# Patient Record
Sex: FEMALE | Race: WHITE | NOT HISPANIC OR LATINO | Employment: OTHER | URBAN - METROPOLITAN AREA
[De-identification: names, ages, dates, MRNs, and addresses within clinical notes are randomized per-mention and may not be internally consistent; named-entity substitution may affect disease eponyms.]

---

## 2017-01-01 ENCOUNTER — APPOINTMENT (OUTPATIENT)
Dept: CT IMAGING | Facility: HOSPITAL | Age: 82
End: 2017-01-01

## 2017-01-01 ENCOUNTER — APPOINTMENT (OUTPATIENT)
Dept: GENERAL RADIOLOGY | Facility: HOSPITAL | Age: 82
End: 2017-01-01

## 2017-01-01 ENCOUNTER — HOSPITAL ENCOUNTER (INPATIENT)
Facility: HOSPITAL | Age: 82
LOS: 2 days | Discharge: NURSING, REHAB OR LONG-TERM CARE | End: 2017-01-30
Attending: EMERGENCY MEDICINE | Admitting: INTERNAL MEDICINE

## 2017-01-01 ENCOUNTER — HOSPITAL ENCOUNTER (INPATIENT)
Facility: HOSPITAL | Age: 82
LOS: 11 days | Discharge: NURSING, REHAB OR LONG-TERM CARE | End: 2017-01-13
Attending: EMERGENCY MEDICINE | Admitting: FAMILY MEDICINE

## 2017-01-01 ENCOUNTER — HOSPITAL ENCOUNTER (EMERGENCY)
Facility: HOSPITAL | Age: 82
Discharge: HOME | End: 2017-03-10
Attending: EMERGENCY MEDICINE | Admitting: EMERGENCY MEDICINE

## 2017-01-01 ENCOUNTER — HOSPITAL ENCOUNTER (EMERGENCY)
Facility: HOSPITAL | Age: 82
Discharge: HOME | End: 2017-03-02
Attending: EMERGENCY MEDICINE | Admitting: EMERGENCY MEDICINE

## 2017-01-01 ENCOUNTER — HOSPITAL ENCOUNTER (INPATIENT)
Facility: HOSPITAL | Age: 82
End: 2017-01-01
Attending: FAMILY MEDICINE | Admitting: FAMILY MEDICINE

## 2017-01-01 VITALS
HEART RATE: 107 BPM | HEIGHT: 65 IN | WEIGHT: 110 LBS | BODY MASS INDEX: 18.33 KG/M2 | SYSTOLIC BLOOD PRESSURE: 141 MMHG | TEMPERATURE: 98.2 F | OXYGEN SATURATION: 90 % | DIASTOLIC BLOOD PRESSURE: 98 MMHG | RESPIRATION RATE: 20 BRPM

## 2017-01-01 VITALS
HEIGHT: 62 IN | WEIGHT: 99.21 LBS | TEMPERATURE: 98 F | OXYGEN SATURATION: 92 % | DIASTOLIC BLOOD PRESSURE: 88 MMHG | BODY MASS INDEX: 18.26 KG/M2 | RESPIRATION RATE: 18 BRPM | SYSTOLIC BLOOD PRESSURE: 115 MMHG | HEART RATE: 90 BPM

## 2017-01-01 VITALS
DIASTOLIC BLOOD PRESSURE: 114 MMHG | HEIGHT: 61 IN | SYSTOLIC BLOOD PRESSURE: 134 MMHG | HEART RATE: 78 BPM | RESPIRATION RATE: 20 BRPM | BODY MASS INDEX: 19.45 KG/M2 | OXYGEN SATURATION: 94 % | WEIGHT: 103 LBS | TEMPERATURE: 97.6 F

## 2017-01-01 VITALS
OXYGEN SATURATION: 97 % | WEIGHT: 97.7 LBS | BODY MASS INDEX: 17.98 KG/M2 | HEART RATE: 77 BPM | HEIGHT: 62 IN | TEMPERATURE: 98 F | SYSTOLIC BLOOD PRESSURE: 111 MMHG | RESPIRATION RATE: 16 BRPM | DIASTOLIC BLOOD PRESSURE: 73 MMHG

## 2017-01-01 DIAGNOSIS — J18.9 COMMUNITY ACQUIRED PNEUMONIA: ICD-10-CM

## 2017-01-01 DIAGNOSIS — Z74.09 IMPAIRED MOBILITY AND ADLS: ICD-10-CM

## 2017-01-01 DIAGNOSIS — D64.9 CHRONIC ANEMIA: ICD-10-CM

## 2017-01-01 DIAGNOSIS — I48.20 CHRONIC ATRIAL FIBRILLATION (HCC): Chronic | ICD-10-CM

## 2017-01-01 DIAGNOSIS — Z78.9 IMPAIRED MOBILITY AND ADLS: ICD-10-CM

## 2017-01-01 DIAGNOSIS — I10 ESSENTIAL HYPERTENSION: Chronic | ICD-10-CM

## 2017-01-01 DIAGNOSIS — I48.91 ATRIAL FIBRILLATION, UNSPECIFIED TYPE (HCC): ICD-10-CM

## 2017-01-01 DIAGNOSIS — Z74.09 IMPAIRED FUNCTIONAL MOBILITY, BALANCE, GAIT, AND ENDURANCE: ICD-10-CM

## 2017-01-01 DIAGNOSIS — J98.01 BRONCHOSPASM: ICD-10-CM

## 2017-01-01 DIAGNOSIS — T07.XXXA MULTIPLE ABRASIONS: ICD-10-CM

## 2017-01-01 DIAGNOSIS — I50.9 ACUTE CONGESTIVE HEART FAILURE, UNSPECIFIED CONGESTIVE HEART FAILURE TYPE: Primary | ICD-10-CM

## 2017-01-01 DIAGNOSIS — I50.31 ACUTE DIASTOLIC (CONGESTIVE) HEART FAILURE (HCC): Primary | ICD-10-CM
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
## 2017-01-01 DIAGNOSIS — J96.01 ACUTE RESPIRATORY FAILURE WITH HYPOXIA (HCC): ICD-10-CM

## 2017-01-01 DIAGNOSIS — D64.9 SYMPTOMATIC ANEMIA: Primary | ICD-10-CM

## 2017-01-01 DIAGNOSIS — R53.1 GENERAL WEAKNESS: ICD-10-CM

## 2017-01-01 DIAGNOSIS — S00.83XA TRAUMATIC HEMATOMA OF FOREHEAD, INITIAL ENCOUNTER: Primary | ICD-10-CM

## 2017-01-01 LAB
ABO + RH BLD: NORMAL
ABO GROUP BLD: NORMAL
ABO GROUP BLD: NORMAL
ALBUMIN SERPL-MCNC: 2.8 G/DL (ref 3.2–4.8)
ALBUMIN SERPL-MCNC: 3.1 G/DL (ref 3.2–4.8)
ALBUMIN SERPL-MCNC: 3.2 G/DL (ref 3.2–4.8)
ALBUMIN SERPL-MCNC: 3.4 G/DL (ref 3.2–4.8)
ALBUMIN/GLOB SERPL: 0.8 G/DL (ref 1.5–2.5)
ALBUMIN/GLOB SERPL: 0.9 G/DL (ref 1.5–2.5)
ALP SERPL-CCNC: 103 U/L (ref 25–100)
ALP SERPL-CCNC: 127 U/L (ref 25–100)
ALP SERPL-CCNC: 148 U/L (ref 25–100)
ALP SERPL-CCNC: 89 U/L (ref 25–100)
ALT SERPL W P-5'-P-CCNC: 14 U/L (ref 7–40)
ALT SERPL W P-5'-P-CCNC: 15 U/L (ref 7–40)
ALT SERPL W P-5'-P-CCNC: 22 U/L (ref 7–40)
ALT SERPL W P-5'-P-CCNC: 9 U/L (ref 7–40)
ANION GAP SERPL CALCULATED.3IONS-SCNC: -2 MMOL/L (ref 3–11)
ANION GAP SERPL CALCULATED.3IONS-SCNC: 0 MMOL/L (ref 3–11)
ANION GAP SERPL CALCULATED.3IONS-SCNC: 10 MMOL/L (ref 3–11)
ANION GAP SERPL CALCULATED.3IONS-SCNC: 12 MMOL/L (ref 3–11)
ANION GAP SERPL CALCULATED.3IONS-SCNC: 3 MMOL/L (ref 3–11)
ANION GAP SERPL CALCULATED.3IONS-SCNC: 6 MMOL/L (ref 3–11)
ANION GAP SERPL CALCULATED.3IONS-SCNC: 6 MMOL/L (ref 3–11)
ANION GAP SERPL CALCULATED.3IONS-SCNC: 7 MMOL/L (ref 3–11)
ANION GAP SERPL CALCULATED.3IONS-SCNC: 7 MMOL/L (ref 3–11)
ANION GAP SERPL CALCULATED.3IONS-SCNC: 8 MMOL/L (ref 3–11)
ANION GAP SERPL CALCULATED.3IONS-SCNC: 9 MMOL/L (ref 3–11)
APTT PPP: 26.4 SECONDS (ref 24–31)
APTT PPP: 27.5 SECONDS (ref 24–31)
ARTERIAL PATENCY WRIST A: ABNORMAL
AST SERPL-CCNC: 19 U/L (ref 0–33)
AST SERPL-CCNC: 24 U/L (ref 0–33)
AST SERPL-CCNC: 25 U/L (ref 0–33)
AST SERPL-CCNC: 25 U/L (ref 0–33)
ATMOSPHERIC PRESS: ABNORMAL MMHG
BACTERIA SPEC AEROBE CULT: NORMAL
BACTERIA SPEC AEROBE CULT: NORMAL
BASE EXCESS BLDA CALC-SCNC: 8 MMOL/L (ref 0–2)
BASOPHILS # BLD AUTO: 0.04 10*3/MM3 (ref 0–0.2)
BASOPHILS # BLD AUTO: 0.05 10*3/MM3 (ref 0–0.2)
BASOPHILS # BLD AUTO: 0.07 10*3/MM3 (ref 0–0.2)
BASOPHILS # BLD AUTO: 0.09 10*3/MM3 (ref 0–0.2)
BASOPHILS NFR BLD AUTO: 0.5 % (ref 0–1)
BASOPHILS NFR BLD AUTO: 0.9 % (ref 0–1)
BASOPHILS NFR BLD AUTO: 0.9 % (ref 0–1)
BASOPHILS NFR BLD AUTO: 1.2 % (ref 0–1)
BDY SITE: ABNORMAL
BH BB BLOOD EXPIRATION DATE: NORMAL
BH BB BLOOD TYPE BARCODE: 5100
BH BB DISPENSE STATUS: NORMAL
BH BB PRODUCT CODE: NORMAL
BH BB UNIT NUMBER: NORMAL
BILIRUB SERPL-MCNC: 0.2 MG/DL (ref 0.3–1.2)
BILIRUB SERPL-MCNC: 0.3 MG/DL (ref 0.3–1.2)
BILIRUB SERPL-MCNC: 0.8 MG/DL (ref 0.3–1.2)
BILIRUB SERPL-MCNC: 0.9 MG/DL (ref 0.3–1.2)
BLD GP AB SCN SERPL QL: NEGATIVE
BLD GP AB SCN SERPL QL: NEGATIVE
BNP SERPL-MCNC: 1020 PG/ML (ref 0–100)
BNP SERPL-MCNC: 1060 PG/ML (ref 0–100)
BNP SERPL-MCNC: 567 PG/ML (ref 0–100)
BUN BLD-MCNC: 14 MG/DL (ref 9–23)
BUN BLD-MCNC: 16 MG/DL (ref 9–23)
BUN BLD-MCNC: 18 MG/DL (ref 9–23)
BUN BLD-MCNC: 19 MG/DL (ref 9–23)
BUN BLD-MCNC: 19 MG/DL (ref 9–23)
BUN BLD-MCNC: 21 MG/DL (ref 9–23)
BUN BLD-MCNC: 21 MG/DL (ref 9–23)
BUN BLD-MCNC: 22 MG/DL (ref 9–23)
BUN BLD-MCNC: 25 MG/DL (ref 9–23)
BUN BLD-MCNC: 26 MG/DL (ref 9–23)
BUN BLD-MCNC: 29 MG/DL (ref 9–23)
BUN BLD-MCNC: 29 MG/DL (ref 9–23)
BUN BLD-MCNC: 30 MG/DL (ref 9–23)
BUN BLD-MCNC: 31 MG/DL (ref 9–23)
BUN/CREAT SERPL: 16 (ref 7–25)
BUN/CREAT SERPL: 17.5 (ref 7–25)
BUN/CREAT SERPL: 21 (ref 7–25)
BUN/CREAT SERPL: 21 (ref 7–25)
BUN/CREAT SERPL: 21.1 (ref 7–25)
BUN/CREAT SERPL: 21.1 (ref 7–25)
BUN/CREAT SERPL: 22.5 (ref 7–25)
BUN/CREAT SERPL: 22.7 (ref 7–25)
BUN/CREAT SERPL: 23.6 (ref 7–25)
BUN/CREAT SERPL: 24.4 (ref 7–25)
BUN/CREAT SERPL: 25.8 (ref 7–25)
BUN/CREAT SERPL: 26.4 (ref 7–25)
BUN/CREAT SERPL: 26.4 (ref 7–25)
BUN/CREAT SERPL: 27.3 (ref 7–25)
C DIFF TOX GENS STL QL NAA+PROBE: NOT DETECTED
CALCIUM SPEC-SCNC: 10 MG/DL (ref 8.7–10.4)
CALCIUM SPEC-SCNC: 10.3 MG/DL (ref 8.7–10.4)
CALCIUM SPEC-SCNC: 8.8 MG/DL (ref 8.7–10.4)
CALCIUM SPEC-SCNC: 9.1 MG/DL (ref 8.7–10.4)
CALCIUM SPEC-SCNC: 9.2 MG/DL (ref 8.7–10.4)
CALCIUM SPEC-SCNC: 9.2 MG/DL (ref 8.7–10.4)
CALCIUM SPEC-SCNC: 9.4 MG/DL (ref 8.7–10.4)
CALCIUM SPEC-SCNC: 9.4 MG/DL (ref 8.7–10.4)
CALCIUM SPEC-SCNC: 9.5 MG/DL (ref 8.7–10.4)
CALCIUM SPEC-SCNC: 9.7 MG/DL (ref 8.7–10.4)
CALCIUM SPEC-SCNC: 9.8 MG/DL (ref 8.7–10.4)
CALCIUM SPEC-SCNC: 9.8 MG/DL (ref 8.7–10.4)
CHLORIDE SERPL-SCNC: 100 MMOL/L (ref 99–109)
CHLORIDE SERPL-SCNC: 100 MMOL/L (ref 99–109)
CHLORIDE SERPL-SCNC: 101 MMOL/L (ref 99–109)
CHLORIDE SERPL-SCNC: 101 MMOL/L (ref 99–109)
CHLORIDE SERPL-SCNC: 102 MMOL/L (ref 99–109)
CHLORIDE SERPL-SCNC: 103 MMOL/L (ref 99–109)
CHLORIDE SERPL-SCNC: 91 MMOL/L (ref 99–109)
CHLORIDE SERPL-SCNC: 91 MMOL/L (ref 99–109)
CHLORIDE SERPL-SCNC: 93 MMOL/L (ref 99–109)
CHLORIDE SERPL-SCNC: 95 MMOL/L (ref 99–109)
CHLORIDE SERPL-SCNC: 97 MMOL/L (ref 99–109)
CHLORIDE SERPL-SCNC: 99 MMOL/L (ref 99–109)
CO2 BLDA-SCNC: 34.5 MMOL/L (ref 22–33)
CO2 SERPL-SCNC: 27 MMOL/L (ref 20–31)
CO2 SERPL-SCNC: 29 MMOL/L (ref 20–31)
CO2 SERPL-SCNC: 30 MMOL/L (ref 20–31)
CO2 SERPL-SCNC: 30 MMOL/L (ref 20–31)
CO2 SERPL-SCNC: 31 MMOL/L (ref 20–31)
CO2 SERPL-SCNC: 31 MMOL/L (ref 20–31)
CO2 SERPL-SCNC: 33 MMOL/L (ref 20–31)
CO2 SERPL-SCNC: 34 MMOL/L (ref 20–31)
CO2 SERPL-SCNC: 38 MMOL/L (ref 20–31)
CO2 SERPL-SCNC: 38 MMOL/L (ref 20–31)
CO2 SERPL-SCNC: 39 MMOL/L (ref 20–31)
CO2 SERPL-SCNC: 39 MMOL/L (ref 20–31)
CO2 SERPL-SCNC: 42 MMOL/L (ref 20–31)
CO2 SERPL-SCNC: 42 MMOL/L (ref 20–31)
COHGB MFR BLD: 0.9 % (ref 0–2)
CREAT BLD-MCNC: 0.8 MG/DL (ref 0.6–1.3)
CREAT BLD-MCNC: 0.8 MG/DL (ref 0.6–1.3)
CREAT BLD-MCNC: 0.9 MG/DL (ref 0.6–1.3)
CREAT BLD-MCNC: 1 MG/DL (ref 0.6–1.3)
CREAT BLD-MCNC: 1.1 MG/DL (ref 0.6–1.3)
CREAT BLD-MCNC: 1.2 MG/DL (ref 0.6–1.3)
CROSSMATCH INTERPRETATION: NORMAL
D-LACTATE SERPL-SCNC: 1.7 MMOL/L (ref 0.5–2)
DEPRECATED RDW RBC AUTO: 65.3 FL (ref 37–54)
DEPRECATED RDW RBC AUTO: 69.2 FL (ref 37–54)
DEPRECATED RDW RBC AUTO: 71.3 FL (ref 37–54)
DEPRECATED RDW RBC AUTO: 73.8 FL (ref 37–54)
DEPRECATED RDW RBC AUTO: 76.1 FL (ref 37–54)
DEPRECATED RDW RBC AUTO: 81 FL (ref 37–54)
DEPRECATED RDW RBC AUTO: 81.7 FL (ref 37–54)
DEPRECATED RDW RBC AUTO: 83.5 FL (ref 37–54)
DEPRECATED RDW RBC AUTO: 84.9 FL (ref 37–54)
DEVELOPER EXPIRATION DATE: NORMAL
DEVELOPER LOT NUMBER: NORMAL
EOSINOPHIL # BLD AUTO: 0.21 10*3/MM3 (ref 0.1–0.3)
EOSINOPHIL # BLD AUTO: 0.5 10*3/MM3 (ref 0.1–0.3)
EOSINOPHIL # BLD AUTO: 0.54 10*3/MM3 (ref 0.1–0.3)
EOSINOPHIL # BLD AUTO: 0.55 10*3/MM3 (ref 0.1–0.3)
EOSINOPHIL NFR BLD AUTO: 3.7 % (ref 0–3)
EOSINOPHIL NFR BLD AUTO: 6.6 % (ref 0–3)
EOSINOPHIL NFR BLD AUTO: 6.9 % (ref 0–3)
EOSINOPHIL NFR BLD AUTO: 7.1 % (ref 0–3)
ERYTHROCYTE [DISTWIDTH] IN BLOOD BY AUTOMATED COUNT: 17 % (ref 11.3–14.5)
ERYTHROCYTE [DISTWIDTH] IN BLOOD BY AUTOMATED COUNT: 18.8 % (ref 11.3–14.5)
ERYTHROCYTE [DISTWIDTH] IN BLOOD BY AUTOMATED COUNT: 19 % (ref 11.3–14.5)
ERYTHROCYTE [DISTWIDTH] IN BLOOD BY AUTOMATED COUNT: 19.5 % (ref 11.3–14.5)
ERYTHROCYTE [DISTWIDTH] IN BLOOD BY AUTOMATED COUNT: 20.2 % (ref 11.3–14.5)
ERYTHROCYTE [DISTWIDTH] IN BLOOD BY AUTOMATED COUNT: 20.8 % (ref 11.3–14.5)
ERYTHROCYTE [DISTWIDTH] IN BLOOD BY AUTOMATED COUNT: 21.4 % (ref 11.3–14.5)
ERYTHROCYTE [DISTWIDTH] IN BLOOD BY AUTOMATED COUNT: 21.7 % (ref 11.3–14.5)
ERYTHROCYTE [DISTWIDTH] IN BLOOD BY AUTOMATED COUNT: 21.9 % (ref 11.3–14.5)
EXPIRATION DATE: NORMAL
FECAL OCCULT BLOOD SCREEN, POC: NORMAL
FERRITIN SERPL-MCNC: 248 NG/ML (ref 10–291)
FOLATE SERPL-MCNC: >24 NG/ML (ref 3.2–20)
GFR SERPL CREATININE-BSD FRML MDRD: 42 ML/MIN/1.73
GFR SERPL CREATININE-BSD FRML MDRD: 46 ML/MIN/1.73
GFR SERPL CREATININE-BSD FRML MDRD: 52 ML/MIN/1.73
GFR SERPL CREATININE-BSD FRML MDRD: 58 ML/MIN/1.73
GFR SERPL CREATININE-BSD FRML MDRD: 67 ML/MIN/1.73
GFR SERPL CREATININE-BSD FRML MDRD: 67 ML/MIN/1.73
GLOBULIN UR ELPH-MCNC: 3.6 GM/DL
GLOBULIN UR ELPH-MCNC: 4 GM/DL
GLOBULIN UR ELPH-MCNC: 4 GM/DL
GLOBULIN UR ELPH-MCNC: 4.1 GM/DL
GLUCOSE BLD-MCNC: 100 MG/DL (ref 70–100)
GLUCOSE BLD-MCNC: 102 MG/DL (ref 70–100)
GLUCOSE BLD-MCNC: 104 MG/DL (ref 70–100)
GLUCOSE BLD-MCNC: 108 MG/DL (ref 70–100)
GLUCOSE BLD-MCNC: 109 MG/DL (ref 70–100)
GLUCOSE BLD-MCNC: 80 MG/DL (ref 70–100)
GLUCOSE BLD-MCNC: 84 MG/DL (ref 70–100)
GLUCOSE BLD-MCNC: 84 MG/DL (ref 70–100)
GLUCOSE BLD-MCNC: 88 MG/DL (ref 70–100)
GLUCOSE BLD-MCNC: 89 MG/DL (ref 70–100)
GLUCOSE BLD-MCNC: 89 MG/DL (ref 70–100)
GLUCOSE BLD-MCNC: 93 MG/DL (ref 70–100)
GLUCOSE BLD-MCNC: 94 MG/DL (ref 70–100)
GLUCOSE BLD-MCNC: 97 MG/DL (ref 70–100)
HCO3 BLDA-SCNC: 33 MMOL/L (ref 20–26)
HCT VFR BLD AUTO: 26.1 % (ref 34.5–44)
HCT VFR BLD AUTO: 26.4 % (ref 34.5–44)
HCT VFR BLD AUTO: 26.6 % (ref 34.5–44)
HCT VFR BLD AUTO: 26.6 % (ref 34.5–44)
HCT VFR BLD AUTO: 27.1 % (ref 34.5–44)
HCT VFR BLD AUTO: 27.2 % (ref 34.5–44)
HCT VFR BLD AUTO: 27.8 % (ref 34.5–44)
HCT VFR BLD AUTO: 29 % (ref 34.5–44)
HCT VFR BLD AUTO: 29.4 % (ref 34.5–44)
HCT VFR BLD CALC: 25.2 %
HGB BLD-MCNC: 8.3 G/DL (ref 11.5–15.5)
HGB BLD-MCNC: 8.4 G/DL (ref 11.5–15.5)
HGB BLD-MCNC: 8.5 G/DL (ref 11.5–15.5)
HGB BLD-MCNC: 8.7 G/DL (ref 11.5–15.5)
HGB BLD-MCNC: 8.7 G/DL (ref 11.5–15.5)
HGB BLD-MCNC: 8.8 G/DL (ref 11.5–15.5)
HGB BLD-MCNC: 8.9 G/DL (ref 11.5–15.5)
HGB BLD-MCNC: 9.3 G/DL (ref 11.5–15.5)
HGB BLD-MCNC: 9.4 G/DL (ref 11.5–15.5)
HGB BLDA-MCNC: 8.2 G/DL (ref 14–18)
HOLD SPECIMEN: NORMAL
HOROWITZ INDEX BLD+IHG-RTO: 32 %
IMM GRANULOCYTES # BLD: 0.04 10*3/MM3 (ref 0–0.03)
IMM GRANULOCYTES # BLD: 0.05 10*3/MM3 (ref 0–0.03)
IMM GRANULOCYTES # BLD: 0.07 10*3/MM3 (ref 0–0.03)
IMM GRANULOCYTES # BLD: 0.07 10*3/MM3 (ref 0–0.03)
IMM GRANULOCYTES NFR BLD: 0.6 % (ref 0–0.6)
IMM GRANULOCYTES NFR BLD: 0.6 % (ref 0–0.6)
IMM GRANULOCYTES NFR BLD: 0.9 % (ref 0–0.6)
IMM GRANULOCYTES NFR BLD: 1.2 % (ref 0–0.6)
INR PPP: 1.03
INR PPP: 1.05
IRON 24H UR-MRATE: 31 MCG/DL (ref 50–175)
IRON SATN MFR SERPL: 48 % (ref 15–50)
LIPASE SERPL-CCNC: 18 U/L (ref 6–51)
LYMPHOCYTES # BLD AUTO: 0.99 10*3/MM3 (ref 0.6–4.8)
LYMPHOCYTES # BLD AUTO: 1.12 10*3/MM3 (ref 0.6–4.8)
LYMPHOCYTES # BLD AUTO: 1.86 10*3/MM3 (ref 0.6–4.8)
LYMPHOCYTES # BLD AUTO: 1.97 10*3/MM3 (ref 0.6–4.8)
LYMPHOCYTES NFR BLD AUTO: 13.7 % (ref 24–44)
LYMPHOCYTES NFR BLD AUTO: 19.5 % (ref 24–44)
LYMPHOCYTES NFR BLD AUTO: 23.9 % (ref 24–44)
LYMPHOCYTES NFR BLD AUTO: 24.1 % (ref 24–44)
Lab: NORMAL
MAGNESIUM SERPL-MCNC: 2.1 MG/DL (ref 1.3–2.7)
MCH RBC QN AUTO: 32.2 PG (ref 27–31)
MCH RBC QN AUTO: 32.7 PG (ref 27–31)
MCH RBC QN AUTO: 32.9 PG (ref 27–31)
MCH RBC QN AUTO: 33.2 PG (ref 27–31)
MCH RBC QN AUTO: 33.2 PG (ref 27–31)
MCH RBC QN AUTO: 33.7 PG (ref 27–31)
MCH RBC QN AUTO: 33.8 PG (ref 27–31)
MCH RBC QN AUTO: 34.8 PG (ref 27–31)
MCH RBC QN AUTO: 34.8 PG (ref 27–31)
MCHC RBC AUTO-ENTMCNC: 31.6 G/DL (ref 32–36)
MCHC RBC AUTO-ENTMCNC: 31.7 G/DL (ref 32–36)
MCHC RBC AUTO-ENTMCNC: 31.8 G/DL (ref 32–36)
MCHC RBC AUTO-ENTMCNC: 32 G/DL (ref 32–36)
MCHC RBC AUTO-ENTMCNC: 32.1 G/DL (ref 32–36)
MCHC RBC AUTO-ENTMCNC: 32.1 G/DL (ref 32–36)
MCHC RBC AUTO-ENTMCNC: 32.2 G/DL (ref 32–36)
MCHC RBC AUTO-ENTMCNC: 32.7 G/DL (ref 32–36)
MCHC RBC AUTO-ENTMCNC: 32.7 G/DL (ref 32–36)
MCV RBC AUTO: 100 FL (ref 80–99)
MCV RBC AUTO: 100.4 FL (ref 80–99)
MCV RBC AUTO: 103.1 FL (ref 80–99)
MCV RBC AUTO: 104.3 FL (ref 80–99)
MCV RBC AUTO: 104.9 FL (ref 80–99)
MCV RBC AUTO: 105.8 FL (ref 80–99)
MCV RBC AUTO: 106.1 FL (ref 80–99)
MCV RBC AUTO: 106.4 FL (ref 80–99)
MCV RBC AUTO: 108.6 FL (ref 80–99)
METHGB BLD QL: 0.8 % (ref 0–1.5)
MODALITY: ABNORMAL
MONOCYTES # BLD AUTO: 0.46 10*3/MM3 (ref 0–1)
MONOCYTES # BLD AUTO: 0.86 10*3/MM3 (ref 0–1)
MONOCYTES # BLD AUTO: 0.97 10*3/MM3 (ref 0–1)
MONOCYTES # BLD AUTO: 1.07 10*3/MM3 (ref 0–1)
MONOCYTES NFR BLD AUTO: 11.9 % (ref 0–12)
MONOCYTES NFR BLD AUTO: 12.5 % (ref 0–12)
MONOCYTES NFR BLD AUTO: 13.1 % (ref 0–12)
MONOCYTES NFR BLD AUTO: 8 % (ref 0–12)
NEGATIVE CONTROL: NEGATIVE
NEUTROPHILS # BLD AUTO: 3.82 10*3/MM3 (ref 1.5–8.3)
NEUTROPHILS # BLD AUTO: 4.31 10*3/MM3 (ref 1.5–8.3)
NEUTROPHILS # BLD AUTO: 4.47 10*3/MM3 (ref 1.5–8.3)
NEUTROPHILS # BLD AUTO: 4.75 10*3/MM3 (ref 1.5–8.3)
NEUTROPHILS NFR BLD AUTO: 54.4 % (ref 41–71)
NEUTROPHILS NFR BLD AUTO: 55.4 % (ref 41–71)
NEUTROPHILS NFR BLD AUTO: 65.7 % (ref 41–71)
NEUTROPHILS NFR BLD AUTO: 66.7 % (ref 41–71)
NRBC BLD MANUAL-RTO: 0 /100 WBC (ref 0–0)
OXYHGB MFR BLDV: 93.5 % (ref 94–99)
PCO2 BLDA: 49.9 MM HG (ref 35–45)
PH BLDA: 7.43 PH UNITS (ref 7.35–7.45)
PLATELET # BLD AUTO: 152 10*3/MM3 (ref 150–450)
PLATELET # BLD AUTO: 152 10*3/MM3 (ref 150–450)
PLATELET # BLD AUTO: 159 10*3/MM3 (ref 150–450)
PLATELET # BLD AUTO: 172 10*3/MM3 (ref 150–450)
PLATELET # BLD AUTO: 175 10*3/MM3 (ref 150–450)
PLATELET # BLD AUTO: 187 10*3/MM3 (ref 150–450)
PLATELET # BLD AUTO: 190 10*3/MM3 (ref 150–450)
PLATELET # BLD AUTO: 197 10*3/MM3 (ref 150–450)
PLATELET # BLD AUTO: 233 10*3/MM3 (ref 150–450)
PMV BLD AUTO: 10.6 FL (ref 6–12)
PMV BLD AUTO: 10.8 FL (ref 6–12)
PMV BLD AUTO: 10.9 FL (ref 6–12)
PMV BLD AUTO: 11 FL (ref 6–12)
PMV BLD AUTO: 11 FL (ref 6–12)
PMV BLD AUTO: 11.2 FL (ref 6–12)
PMV BLD AUTO: 11.6 FL (ref 6–12)
PO2 BLDA: 87.2 MM HG (ref 83–108)
POSITIVE CONTROL: POSITIVE
POTASSIUM BLD-SCNC: 2.6 MMOL/L (ref 3.5–5.5)
POTASSIUM BLD-SCNC: 2.9 MMOL/L (ref 3.5–5.5)
POTASSIUM BLD-SCNC: 3.2 MMOL/L (ref 3.5–5.5)
POTASSIUM BLD-SCNC: 3.2 MMOL/L (ref 3.5–5.5)
POTASSIUM BLD-SCNC: 3.6 MMOL/L (ref 3.5–5.5)
POTASSIUM BLD-SCNC: 3.7 MMOL/L (ref 3.5–5.5)
POTASSIUM BLD-SCNC: 3.8 MMOL/L (ref 3.5–5.5)
POTASSIUM BLD-SCNC: 3.8 MMOL/L (ref 3.5–5.5)
POTASSIUM BLD-SCNC: 4.1 MMOL/L (ref 3.5–5.5)
POTASSIUM BLD-SCNC: 4.2 MMOL/L (ref 3.5–5.5)
POTASSIUM BLD-SCNC: 4.3 MMOL/L (ref 3.5–5.5)
POTASSIUM BLD-SCNC: 4.4 MMOL/L (ref 3.5–5.5)
POTASSIUM BLD-SCNC: 4.5 MMOL/L (ref 3.5–5.5)
POTASSIUM BLD-SCNC: 4.7 MMOL/L (ref 3.5–5.5)
PROT SERPL-MCNC: 6.4 G/DL (ref 5.7–8.2)
PROT SERPL-MCNC: 7.2 G/DL (ref 5.7–8.2)
PROT SERPL-MCNC: 7.2 G/DL (ref 5.7–8.2)
PROT SERPL-MCNC: 7.4 G/DL (ref 5.7–8.2)
PROTHROMBIN TIME: 11.2 SECONDS (ref 9.6–11.5)
PROTHROMBIN TIME: 11.4 SECONDS (ref 9.6–11.5)
RBC # BLD AUTO: 2.46 10*6/MM3 (ref 3.89–5.14)
RBC # BLD AUTO: 2.5 10*6/MM3 (ref 3.89–5.14)
RBC # BLD AUTO: 2.55 10*6/MM3 (ref 3.89–5.14)
RBC # BLD AUTO: 2.56 10*6/MM3 (ref 3.89–5.14)
RBC # BLD AUTO: 2.65 10*6/MM3 (ref 3.89–5.14)
RBC # BLD AUTO: 2.67 10*6/MM3 (ref 3.89–5.14)
RBC # BLD AUTO: 2.7 10*6/MM3 (ref 3.89–5.14)
RBC # BLD AUTO: 2.72 10*6/MM3 (ref 3.89–5.14)
RBC # BLD AUTO: 2.78 10*6/MM3 (ref 3.89–5.14)
RETICS/RBC NFR AUTO: 5.2 % (ref 0.5–1.5)
RH BLD: POSITIVE
RH BLD: POSITIVE
SODIUM BLD-SCNC: 135 MMOL/L (ref 132–146)
SODIUM BLD-SCNC: 135 MMOL/L (ref 132–146)
SODIUM BLD-SCNC: 136 MMOL/L (ref 132–146)
SODIUM BLD-SCNC: 138 MMOL/L (ref 132–146)
SODIUM BLD-SCNC: 139 MMOL/L (ref 132–146)
SODIUM BLD-SCNC: 140 MMOL/L (ref 132–146)
SODIUM BLD-SCNC: 141 MMOL/L (ref 132–146)
SODIUM BLD-SCNC: 142 MMOL/L (ref 132–146)
TIBC SERPL-MCNC: 64 MCG/DL (ref 250–450)
TROPONIN I SERPL-MCNC: 0.02 NG/ML (ref 0–0.07)
TROPONIN I SERPL-MCNC: 0.03 NG/ML (ref 0–0.07)
UNIT  ABO: NORMAL
UNIT  RH: NORMAL
VIT B12 BLD-MCNC: 779 PG/ML (ref 211–911)
WBC NRBC COR # BLD: 10.05 10*3/MM3 (ref 3.5–10.8)
WBC NRBC COR # BLD: 11.4 10*3/MM3 (ref 3.5–10.8)
WBC NRBC COR # BLD: 5.73 10*3/MM3 (ref 3.5–10.8)
WBC NRBC COR # BLD: 7.23 10*3/MM3 (ref 3.5–10.8)
WBC NRBC COR # BLD: 7.52 10*3/MM3 (ref 3.5–10.8)
WBC NRBC COR # BLD: 7.78 10*3/MM3 (ref 3.5–10.8)
WBC NRBC COR # BLD: 8.19 10*3/MM3 (ref 3.5–10.8)
WBC NRBC COR # BLD: 9.28 10*3/MM3 (ref 3.5–10.8)
WBC NRBC COR # BLD: 9.38 10*3/MM3 (ref 3.5–10.8)
WHOLE BLOOD HOLD SPECIMEN: NORMAL

## 2017-01-01 PROCEDURE — 99232 SBSQ HOSP IP/OBS MODERATE 35: CPT | Performed by: INTERNAL MEDICINE

## 2017-01-01 PROCEDURE — P9016 RBC LEUKOCYTES REDUCED: HCPCS

## 2017-01-01 PROCEDURE — 86920 COMPATIBILITY TEST SPIN: CPT

## 2017-01-01 PROCEDURE — 36415 COLL VENOUS BLD VENIPUNCTURE: CPT

## 2017-01-01 PROCEDURE — 80048 BASIC METABOLIC PNL TOTAL CA: CPT | Performed by: PHYSICIAN ASSISTANT

## 2017-01-01 PROCEDURE — 83540 ASSAY OF IRON: CPT | Performed by: INTERNAL MEDICINE

## 2017-01-01 PROCEDURE — 94640 AIRWAY INHALATION TREATMENT: CPT

## 2017-01-01 PROCEDURE — 97110 THERAPEUTIC EXERCISES: CPT

## 2017-01-01 PROCEDURE — 94799 UNLISTED PULMONARY SVC/PX: CPT

## 2017-01-01 PROCEDURE — 25010000002 PIPERACILLIN SOD-TAZOBACTAM PER 1 G: Performed by: FAMILY MEDICINE

## 2017-01-01 PROCEDURE — 25010000002 HEPARIN (PORCINE) PER 1000 UNITS: Performed by: NURSE PRACTITIONER

## 2017-01-01 PROCEDURE — 80053 COMPREHEN METABOLIC PANEL: CPT | Performed by: EMERGENCY MEDICINE

## 2017-01-01 PROCEDURE — 80048 BASIC METABOLIC PNL TOTAL CA: CPT | Performed by: INTERNAL MEDICINE

## 2017-01-01 PROCEDURE — 85025 COMPLETE CBC W/AUTO DIFF WBC: CPT | Performed by: EMERGENCY MEDICINE

## 2017-01-01 PROCEDURE — 83605 ASSAY OF LACTIC ACID: CPT | Performed by: EMERGENCY MEDICINE

## 2017-01-01 PROCEDURE — 97116 GAIT TRAINING THERAPY: CPT

## 2017-01-01 PROCEDURE — 80048 BASIC METABOLIC PNL TOTAL CA: CPT

## 2017-01-01 PROCEDURE — 99239 HOSP IP/OBS DSCHRG MGMT >30: CPT | Performed by: NURSE PRACTITIONER

## 2017-01-01 PROCEDURE — 97530 THERAPEUTIC ACTIVITIES: CPT

## 2017-01-01 PROCEDURE — G8978 MOBILITY CURRENT STATUS: HCPCS | Performed by: PHYSICAL THERAPIST

## 2017-01-01 PROCEDURE — 99232 SBSQ HOSP IP/OBS MODERATE 35: CPT | Performed by: FAMILY MEDICINE

## 2017-01-01 PROCEDURE — 83880 ASSAY OF NATRIURETIC PEPTIDE: CPT | Performed by: EMERGENCY MEDICINE

## 2017-01-01 PROCEDURE — 99285 EMERGENCY DEPT VISIT HI MDM: CPT

## 2017-01-01 PROCEDURE — 25010000002 FUROSEMIDE PER 20 MG: Performed by: EMERGENCY MEDICINE

## 2017-01-01 PROCEDURE — 93005 ELECTROCARDIOGRAM TRACING: CPT | Performed by: EMERGENCY MEDICINE

## 2017-01-01 PROCEDURE — 82805 BLOOD GASES W/O2 SATURATION: CPT | Performed by: INTERNAL MEDICINE

## 2017-01-01 PROCEDURE — 71010 HC CHEST PA OR AP: CPT

## 2017-01-01 PROCEDURE — 85027 COMPLETE CBC AUTOMATED: CPT | Performed by: FAMILY MEDICINE

## 2017-01-01 PROCEDURE — 99223 1ST HOSP IP/OBS HIGH 75: CPT | Performed by: INTERNAL MEDICINE

## 2017-01-01 PROCEDURE — 86901 BLOOD TYPING SEROLOGIC RH(D): CPT

## 2017-01-01 PROCEDURE — 86850 RBC ANTIBODY SCREEN: CPT

## 2017-01-01 PROCEDURE — 86900 BLOOD TYPING SEROLOGIC ABO: CPT

## 2017-01-01 PROCEDURE — 25010000002 VANCOMYCIN PER 500 MG: Performed by: EMERGENCY MEDICINE

## 2017-01-01 PROCEDURE — 99233 SBSQ HOSP IP/OBS HIGH 50: CPT | Performed by: FAMILY MEDICINE

## 2017-01-01 PROCEDURE — 36430 TRANSFUSION BLD/BLD COMPNT: CPT

## 2017-01-01 PROCEDURE — 99284 EMERGENCY DEPT VISIT MOD MDM: CPT

## 2017-01-01 PROCEDURE — 80048 BASIC METABOLIC PNL TOTAL CA: CPT | Performed by: FAMILY MEDICINE

## 2017-01-01 PROCEDURE — 85045 AUTOMATED RETICULOCYTE COUNT: CPT | Performed by: INTERNAL MEDICINE

## 2017-01-01 PROCEDURE — 80053 COMPREHEN METABOLIC PANEL: CPT | Performed by: INTERNAL MEDICINE

## 2017-01-01 PROCEDURE — 99233 SBSQ HOSP IP/OBS HIGH 50: CPT | Performed by: INTERNAL MEDICINE

## 2017-01-01 PROCEDURE — 25010000002 PIPERACILLIN SOD-TAZOBACTAM PER 1 G: Performed by: EMERGENCY MEDICINE

## 2017-01-01 PROCEDURE — 70450 CT HEAD/BRAIN W/O DYE: CPT

## 2017-01-01 PROCEDURE — 97165 OT EVAL LOW COMPLEX 30 MIN: CPT

## 2017-01-01 PROCEDURE — 85730 THROMBOPLASTIN TIME PARTIAL: CPT | Performed by: EMERGENCY MEDICINE

## 2017-01-01 PROCEDURE — 82746 ASSAY OF FOLIC ACID SERUM: CPT | Performed by: INTERNAL MEDICINE

## 2017-01-01 PROCEDURE — G8979 MOBILITY GOAL STATUS: HCPCS | Performed by: PHYSICAL THERAPIST

## 2017-01-01 PROCEDURE — 97162 PT EVAL MOD COMPLEX 30 MIN: CPT

## 2017-01-01 PROCEDURE — 84484 ASSAY OF TROPONIN QUANT: CPT

## 2017-01-01 PROCEDURE — 87040 BLOOD CULTURE FOR BACTERIA: CPT | Performed by: EMERGENCY MEDICINE

## 2017-01-01 PROCEDURE — 36600 WITHDRAWAL OF ARTERIAL BLOOD: CPT | Performed by: INTERNAL MEDICINE

## 2017-01-01 PROCEDURE — 96374 THER/PROPH/DIAG INJ IV PUSH: CPT

## 2017-01-01 PROCEDURE — 87493 C DIFF AMPLIFIED PROBE: CPT | Performed by: NURSE PRACTITIONER

## 2017-01-01 PROCEDURE — 25010000002 ONDANSETRON PER 1 MG: Performed by: FAMILY MEDICINE

## 2017-01-01 PROCEDURE — 85025 COMPLETE CBC W/AUTO DIFF WBC: CPT | Performed by: INTERNAL MEDICINE

## 2017-01-01 PROCEDURE — 99223 1ST HOSP IP/OBS HIGH 75: CPT | Performed by: FAMILY MEDICINE

## 2017-01-01 PROCEDURE — 85027 COMPLETE CBC AUTOMATED: CPT | Performed by: INTERNAL MEDICINE

## 2017-01-01 PROCEDURE — 25010000002 LEVOFLOXACIN PER 250 MG

## 2017-01-01 PROCEDURE — 83550 IRON BINDING TEST: CPT | Performed by: INTERNAL MEDICINE

## 2017-01-01 PROCEDURE — 85610 PROTHROMBIN TIME: CPT | Performed by: INTERNAL MEDICINE

## 2017-01-01 PROCEDURE — 83735 ASSAY OF MAGNESIUM: CPT

## 2017-01-01 PROCEDURE — 82607 VITAMIN B-12: CPT | Performed by: INTERNAL MEDICINE

## 2017-01-01 PROCEDURE — 97166 OT EVAL MOD COMPLEX 45 MIN: CPT | Performed by: OCCUPATIONAL THERAPIST

## 2017-01-01 PROCEDURE — 97162 PT EVAL MOD COMPLEX 30 MIN: CPT | Performed by: PHYSICAL THERAPIST

## 2017-01-01 PROCEDURE — 84132 ASSAY OF SERUM POTASSIUM: CPT | Performed by: INTERNAL MEDICINE

## 2017-01-01 PROCEDURE — 85610 PROTHROMBIN TIME: CPT | Performed by: EMERGENCY MEDICINE

## 2017-01-01 PROCEDURE — 93005 ELECTROCARDIOGRAM TRACING: CPT

## 2017-01-01 PROCEDURE — 94760 N-INVAS EAR/PLS OXIMETRY 1: CPT

## 2017-01-01 PROCEDURE — 82728 ASSAY OF FERRITIN: CPT | Performed by: INTERNAL MEDICINE

## 2017-01-01 PROCEDURE — 97530 THERAPEUTIC ACTIVITIES: CPT | Performed by: OCCUPATIONAL THERAPIST

## 2017-01-01 PROCEDURE — 83690 ASSAY OF LIPASE: CPT | Performed by: EMERGENCY MEDICINE

## 2017-01-01 PROCEDURE — 85730 THROMBOPLASTIN TIME PARTIAL: CPT | Performed by: INTERNAL MEDICINE

## 2017-01-01 RX ORDER — DOCUSATE SODIUM 100 MG/1
100 CAPSULE, LIQUID FILLED ORAL 2 TIMES DAILY
Status: DISCONTINUED | OUTPATIENT
Start: 2017-01-01 | End: 2017-01-01 | Stop reason: HOSPADM

## 2017-01-01 RX ORDER — CARVEDILOL 6.25 MG/1
6.25 TABLET ORAL 2 TIMES DAILY WITH MEALS
Status: DISCONTINUED | OUTPATIENT
Start: 2017-01-01 | End: 2017-01-01 | Stop reason: HOSPADM

## 2017-01-01 RX ORDER — DOCUSATE SODIUM 100 MG/1
200 CAPSULE, LIQUID FILLED ORAL DAILY
COMMUNITY

## 2017-01-01 RX ORDER — CARVEDILOL 6.25 MG/1
6.25 TABLET ORAL 2 TIMES DAILY WITH MEALS
Status: DISCONTINUED | OUTPATIENT
Start: 2017-01-01 | End: 2017-01-01

## 2017-01-01 RX ORDER — GUAIFENESIN AND DEXTROMETHORPHAN HYDROBROMIDE 600; 30 MG/1; MG/1
1 TABLET, EXTENDED RELEASE ORAL 2 TIMES DAILY
Status: DISCONTINUED | OUTPATIENT
Start: 2017-01-01 | End: 2017-01-01 | Stop reason: HOSPADM

## 2017-01-01 RX ORDER — METOLAZONE 5 MG/1
5 TABLET ORAL ONCE
Status: COMPLETED | OUTPATIENT
Start: 2017-01-01 | End: 2017-01-01

## 2017-01-01 RX ORDER — FUROSEMIDE 10 MG/ML
40 INJECTION INTRAMUSCULAR; INTRAVENOUS ONCE
Status: COMPLETED | OUTPATIENT
Start: 2017-01-01 | End: 2017-01-01

## 2017-01-01 RX ORDER — ONDANSETRON 2 MG/ML
4 INJECTION INTRAMUSCULAR; INTRAVENOUS EVERY 6 HOURS PRN
Status: DISCONTINUED | OUTPATIENT
Start: 2017-01-01 | End: 2017-01-01 | Stop reason: HOSPADM

## 2017-01-01 RX ORDER — HEPARIN SODIUM 5000 [USP'U]/ML
5000 INJECTION, SOLUTION INTRAVENOUS; SUBCUTANEOUS EVERY 12 HOURS SCHEDULED
Status: DISCONTINUED | OUTPATIENT
Start: 2017-01-01 | End: 2017-01-01 | Stop reason: HOSPADM

## 2017-01-01 RX ORDER — VALSARTAN 80 MG/1
40 TABLET ORAL EVERY 12 HOURS SCHEDULED
Status: DISCONTINUED | OUTPATIENT
Start: 2017-01-01 | End: 2017-01-01 | Stop reason: HOSPADM

## 2017-01-01 RX ORDER — DOXYCYCLINE HYCLATE 100 MG/1
100 CAPSULE ORAL EVERY 12 HOURS SCHEDULED
Status: DISCONTINUED | OUTPATIENT
Start: 2017-01-01 | End: 2017-01-01

## 2017-01-01 RX ORDER — LANOLIN ALCOHOL/MO/W.PET/CERES
1000 CREAM (GRAM) TOPICAL DAILY
COMMUNITY

## 2017-01-01 RX ORDER — TORSEMIDE 10 MG/1
5 TABLET ORAL DAILY
Status: ON HOLD | COMMUNITY
End: 2017-01-01

## 2017-01-01 RX ORDER — LIDOCAINE 50 MG/G
1 PATCH TOPICAL
Refills: 0
Start: 2017-01-01

## 2017-01-01 RX ORDER — BUMETANIDE 0.25 MG/ML
1 INJECTION INTRAMUSCULAR; INTRAVENOUS DAILY
Status: DISCONTINUED | OUTPATIENT
Start: 2017-01-01 | End: 2017-01-01

## 2017-01-01 RX ORDER — BUMETANIDE 0.25 MG/ML
1 INJECTION INTRAMUSCULAR; INTRAVENOUS EVERY 12 HOURS
Status: DISCONTINUED | OUTPATIENT
Start: 2017-01-01 | End: 2017-01-01

## 2017-01-01 RX ORDER — LIDOCAINE 50 MG/G
1 PATCH TOPICAL
Status: DISCONTINUED | OUTPATIENT
Start: 2017-01-01 | End: 2017-01-01 | Stop reason: HOSPADM

## 2017-01-01 RX ORDER — LOSARTAN POTASSIUM 50 MG/1
100 TABLET ORAL DAILY
Status: DISCONTINUED | OUTPATIENT
Start: 2017-01-01 | End: 2017-01-01

## 2017-01-01 RX ORDER — NYSTATIN 100000 [USP'U]/G
POWDER TOPICAL EVERY 12 HOURS SCHEDULED
Status: DISCONTINUED | OUTPATIENT
Start: 2017-01-01 | End: 2017-01-01 | Stop reason: HOSPADM

## 2017-01-01 RX ORDER — NYSTATIN 100000 [USP'U]/G
POWDER TOPICAL EVERY 12 HOURS SCHEDULED
Refills: 0
Start: 2017-01-01

## 2017-01-01 RX ORDER — LEVOFLOXACIN 5 MG/ML
750 INJECTION, SOLUTION INTRAVENOUS
Status: DISCONTINUED | OUTPATIENT
Start: 2017-01-01 | End: 2017-01-01

## 2017-01-01 RX ORDER — DOCUSATE SODIUM 100 MG/1
200 CAPSULE, LIQUID FILLED ORAL DAILY PRN
Status: DISCONTINUED | OUTPATIENT
Start: 2017-01-01 | End: 2017-01-01 | Stop reason: HOSPADM

## 2017-01-01 RX ORDER — GUAIFENESIN AND DEXTROMETHORPHAN HYDROBROMIDE 600; 30 MG/1; MG/1
1 TABLET, EXTENDED RELEASE ORAL 2 TIMES DAILY
Refills: 0
Start: 2017-01-01

## 2017-01-01 RX ORDER — SODIUM CHLORIDE 0.9 % (FLUSH) 0.9 %
10 SYRINGE (ML) INJECTION AS NEEDED
Status: DISCONTINUED | OUTPATIENT
Start: 2017-01-01 | End: 2017-01-01 | Stop reason: HOSPADM

## 2017-01-01 RX ORDER — ACETAMINOPHEN 325 MG/1
650 TABLET ORAL EVERY 6 HOURS PRN
Status: DISCONTINUED | OUTPATIENT
Start: 2017-01-01 | End: 2017-01-01 | Stop reason: HOSPADM

## 2017-01-01 RX ORDER — POLYETHYLENE GLYCOL 3350 17 G/17G
17 POWDER, FOR SOLUTION ORAL DAILY
Status: DISCONTINUED | OUTPATIENT
Start: 2017-01-01 | End: 2017-01-01

## 2017-01-01 RX ORDER — DOCUSATE SODIUM 100 MG/1
200 CAPSULE, LIQUID FILLED ORAL DAILY
Status: DISCONTINUED | OUTPATIENT
Start: 2017-01-01 | End: 2017-01-01

## 2017-01-01 RX ORDER — LANOLIN ALCOHOL/MO/W.PET/CERES
1000 CREAM (GRAM) TOPICAL DAILY
Status: DISCONTINUED | OUTPATIENT
Start: 2017-01-01 | End: 2017-01-01 | Stop reason: HOSPADM

## 2017-01-01 RX ORDER — FAMOTIDINE 20 MG/1
20 TABLET, FILM COATED ORAL 2 TIMES DAILY
Status: DISCONTINUED | OUTPATIENT
Start: 2017-01-01 | End: 2017-01-01 | Stop reason: HOSPADM

## 2017-01-01 RX ORDER — GABAPENTIN 100 MG/1
100 CAPSULE ORAL NIGHTLY
Status: DISCONTINUED | OUTPATIENT
Start: 2017-01-01 | End: 2017-01-01 | Stop reason: HOSPADM

## 2017-01-01 RX ORDER — ASPIRIN 81 MG/1
324 TABLET, CHEWABLE ORAL ONCE
Status: DISCONTINUED | OUTPATIENT
Start: 2017-01-01 | End: 2017-01-01

## 2017-01-01 RX ORDER — IPRATROPIUM BROMIDE AND ALBUTEROL SULFATE 2.5; .5 MG/3ML; MG/3ML
3 SOLUTION RESPIRATORY (INHALATION) EVERY 4 HOURS PRN
Status: DISCONTINUED | OUTPATIENT
Start: 2017-01-01 | End: 2017-01-01 | Stop reason: HOSPADM

## 2017-01-01 RX ORDER — CARVEDILOL 6.25 MG/1
6.25 TABLET ORAL 2 TIMES DAILY WITH MEALS
COMMUNITY

## 2017-01-01 RX ORDER — FLUOXETINE HYDROCHLORIDE 20 MG/1
40 CAPSULE ORAL DAILY
Status: DISCONTINUED | OUTPATIENT
Start: 2017-01-01 | End: 2017-01-01 | Stop reason: HOSPADM

## 2017-01-01 RX ORDER — POLYETHYLENE GLYCOL 3350 17 G/17G
17 POWDER, FOR SOLUTION ORAL DAILY PRN
Status: DISCONTINUED | OUTPATIENT
Start: 2017-01-01 | End: 2017-01-01 | Stop reason: HOSPADM

## 2017-01-01 RX ORDER — TORSEMIDE 20 MG/1
20 TABLET ORAL DAILY
Status: DISCONTINUED | OUTPATIENT
Start: 2017-01-01 | End: 2017-01-01 | Stop reason: HOSPADM

## 2017-01-01 RX ORDER — POTASSIUM CHLORIDE 7.45 MG/ML
10 INJECTION INTRAVENOUS
Status: DISCONTINUED | OUTPATIENT
Start: 2017-01-01 | End: 2017-01-01

## 2017-01-01 RX ORDER — DIGOXIN 125 MCG
125 TABLET ORAL
COMMUNITY
End: 2017-01-01 | Stop reason: HOSPADM

## 2017-01-01 RX ORDER — FLUTICASONE PROPIONATE 50 MCG
2 SPRAY, SUSPENSION (ML) NASAL DAILY
Status: DISCONTINUED | OUTPATIENT
Start: 2017-01-01 | End: 2017-01-01 | Stop reason: HOSPADM

## 2017-01-01 RX ORDER — DULOXETIN HYDROCHLORIDE 30 MG/1
30 CAPSULE, DELAYED RELEASE ORAL DAILY
COMMUNITY

## 2017-01-01 RX ORDER — POTASSIUM CHLORIDE 750 MG/1
10 TABLET, EXTENDED RELEASE ORAL DAILY
COMMUNITY

## 2017-01-01 RX ORDER — BRIMONIDINE TARTRATE 0.15 %
1 DROPS OPHTHALMIC (EYE) 2 TIMES DAILY
Status: DISCONTINUED | OUTPATIENT
Start: 2017-01-01 | End: 2017-01-01 | Stop reason: HOSPADM

## 2017-01-01 RX ORDER — BACITRACIN, NEOMYCIN, POLYMYXIN B 400; 3.5; 5 [USP'U]/G; MG/G; [USP'U]/G
1 OINTMENT TOPICAL ONCE
Status: COMPLETED | OUTPATIENT
Start: 2017-01-01 | End: 2017-01-01

## 2017-01-01 RX ORDER — LABETALOL HYDROCHLORIDE 5 MG/ML
20 INJECTION, SOLUTION INTRAVENOUS ONCE
Status: COMPLETED | OUTPATIENT
Start: 2017-01-01 | End: 2017-01-01

## 2017-01-01 RX ORDER — ALBUTEROL SULFATE 2.5 MG/3ML
2.5 SOLUTION RESPIRATORY (INHALATION) EVERY 6 HOURS PRN
Status: DISCONTINUED | OUTPATIENT
Start: 2017-01-01 | End: 2017-01-01 | Stop reason: HOSPADM

## 2017-01-01 RX ORDER — LANOLIN ALCOHOL/MO/W.PET/CERES
400 CREAM (GRAM) TOPICAL DAILY
Status: DISCONTINUED | OUTPATIENT
Start: 2017-01-01 | End: 2017-01-01 | Stop reason: HOSPADM

## 2017-01-01 RX ORDER — VANCOMYCIN HYDROCHLORIDE 1 G/200ML
20 INJECTION, SOLUTION INTRAVENOUS ONCE
Status: COMPLETED | OUTPATIENT
Start: 2017-01-01 | End: 2017-01-01

## 2017-01-01 RX ORDER — ALBUTEROL SULFATE 2.5 MG/3ML
2.5 SOLUTION RESPIRATORY (INHALATION) ONCE
Status: COMPLETED | OUTPATIENT
Start: 2017-01-01 | End: 2017-01-01

## 2017-01-01 RX ORDER — ASPIRIN 325 MG
325 TABLET ORAL DAILY
Status: DISCONTINUED | OUTPATIENT
Start: 2017-01-01 | End: 2017-01-01 | Stop reason: HOSPADM

## 2017-01-01 RX ORDER — DOCUSATE SODIUM 100 MG/1
200 CAPSULE, LIQUID FILLED ORAL DAILY
Status: DISCONTINUED | OUTPATIENT
Start: 2017-01-01 | End: 2017-01-01 | Stop reason: SDUPTHER

## 2017-01-01 RX ORDER — CARVEDILOL 12.5 MG/1
12.5 TABLET ORAL EVERY 12 HOURS SCHEDULED
Status: DISCONTINUED | OUTPATIENT
Start: 2017-01-01 | End: 2017-01-01 | Stop reason: HOSPADM

## 2017-01-01 RX ORDER — PANTOPRAZOLE SODIUM 40 MG/1
40 TABLET, DELAYED RELEASE ORAL
Status: DISCONTINUED | OUTPATIENT
Start: 2017-01-01 | End: 2017-01-01 | Stop reason: HOSPADM

## 2017-01-01 RX ORDER — ACETAMINOPHEN 325 MG/1
650 TABLET ORAL EVERY 4 HOURS PRN
Status: DISCONTINUED | OUTPATIENT
Start: 2017-01-01 | End: 2017-01-01 | Stop reason: SDUPTHER

## 2017-01-01 RX ORDER — LORAZEPAM 0.5 MG/1
0.5 TABLET ORAL 2 TIMES DAILY PRN
Status: DISCONTINUED | OUTPATIENT
Start: 2017-01-01 | End: 2017-01-01

## 2017-01-01 RX ORDER — TORSEMIDE 10 MG/1
20 TABLET ORAL DAILY
Start: 2017-01-01

## 2017-01-01 RX ORDER — CETIRIZINE HYDROCHLORIDE 1 MG/ML
5 SYRUP ORAL DAILY
Status: DISCONTINUED | OUTPATIENT
Start: 2017-01-01 | End: 2017-01-01 | Stop reason: HOSPADM

## 2017-01-01 RX ORDER — DULOXETIN HYDROCHLORIDE 30 MG/1
30 CAPSULE, DELAYED RELEASE ORAL DAILY
Status: DISCONTINUED | OUTPATIENT
Start: 2017-01-01 | End: 2017-01-01 | Stop reason: HOSPADM

## 2017-01-01 RX ORDER — LOSARTAN POTASSIUM 100 MG/1
100 TABLET ORAL DAILY
COMMUNITY
End: 2017-01-01 | Stop reason: HOSPADM

## 2017-01-01 RX ORDER — ACETAMINOPHEN 325 MG/1
650 TABLET ORAL EVERY 6 HOURS PRN
Status: DISCONTINUED | OUTPATIENT
Start: 2017-01-01 | End: 2017-01-01 | Stop reason: SDUPTHER

## 2017-01-01 RX ORDER — IPRATROPIUM BROMIDE AND ALBUTEROL SULFATE 2.5; .5 MG/3ML; MG/3ML
3 SOLUTION RESPIRATORY (INHALATION) EVERY 4 HOURS PRN
Qty: 360 ML
Start: 2017-01-01

## 2017-01-01 RX ORDER — SODIUM CHLORIDE 0.9 % (FLUSH) 0.9 %
1-10 SYRINGE (ML) INJECTION AS NEEDED
Status: DISCONTINUED | OUTPATIENT
Start: 2017-01-01 | End: 2017-01-01 | Stop reason: HOSPADM

## 2017-01-01 RX ORDER — GUAIFENESIN 400 MG/1
400 TABLET ORAL EVERY 6 HOURS PRN
COMMUNITY
End: 2017-01-01 | Stop reason: HOSPADM

## 2017-01-01 RX ORDER — DIGOXIN 125 MCG
125 TABLET ORAL
Status: DISCONTINUED | OUTPATIENT
Start: 2017-01-01 | End: 2017-01-01

## 2017-01-01 RX ORDER — ONDANSETRON 4 MG/1
4 TABLET, FILM COATED ORAL EVERY 6 HOURS PRN
Status: DISCONTINUED | OUTPATIENT
Start: 2017-01-01 | End: 2017-01-01 | Stop reason: HOSPADM

## 2017-01-01 RX ORDER — CARVEDILOL 3.12 MG/1
3.12 TABLET ORAL 2 TIMES DAILY WITH MEALS
Status: DISCONTINUED | OUTPATIENT
Start: 2017-01-01 | End: 2017-01-01

## 2017-01-01 RX ORDER — IPRATROPIUM BROMIDE AND ALBUTEROL SULFATE 2.5; .5 MG/3ML; MG/3ML
3 SOLUTION RESPIRATORY (INHALATION) ONCE
Status: COMPLETED | OUTPATIENT
Start: 2017-01-01 | End: 2017-01-01

## 2017-01-01 RX ORDER — POTASSIUM CHLORIDE 750 MG/1
40 CAPSULE, EXTENDED RELEASE ORAL AS NEEDED
Status: DISCONTINUED | OUTPATIENT
Start: 2017-01-01 | End: 2017-01-01

## 2017-01-01 RX ORDER — POTASSIUM CHLORIDE 750 MG/1
20 CAPSULE, EXTENDED RELEASE ORAL 2 TIMES DAILY WITH MEALS
Status: DISCONTINUED | OUTPATIENT
Start: 2017-01-01 | End: 2017-01-01 | Stop reason: HOSPADM

## 2017-01-01 RX ORDER — ACETAMINOPHEN 325 MG/1
650 TABLET ORAL EVERY 4 HOURS PRN
Status: DISCONTINUED | OUTPATIENT
Start: 2017-01-01 | End: 2017-01-01 | Stop reason: HOSPADM

## 2017-01-01 RX ORDER — BRIMONIDINE TARTRATE 0.15 %
1 DROPS OPHTHALMIC (EYE) 2 TIMES DAILY
Status: DISCONTINUED | OUTPATIENT
Start: 2017-01-01 | End: 2017-01-01

## 2017-01-01 RX ORDER — POTASSIUM CHLORIDE 1.5 G/1.77G
40 POWDER, FOR SOLUTION ORAL AS NEEDED
Status: DISCONTINUED | OUTPATIENT
Start: 2017-01-01 | End: 2017-01-01

## 2017-01-01 RX ORDER — VALSARTAN 40 MG/1
40 TABLET ORAL EVERY 12 HOURS SCHEDULED
Start: 2017-01-01

## 2017-01-01 RX ORDER — POTASSIUM CHLORIDE 750 MG/1
20 CAPSULE, EXTENDED RELEASE ORAL ONCE
Status: COMPLETED | OUTPATIENT
Start: 2017-01-01 | End: 2017-01-01

## 2017-01-01 RX ORDER — MECLIZINE HCL 12.5 MG/1
12.5 TABLET ORAL 2 TIMES DAILY PRN
Status: DISCONTINUED | OUTPATIENT
Start: 2017-01-01 | End: 2017-01-01 | Stop reason: HOSPADM

## 2017-01-01 RX ORDER — CETIRIZINE HYDROCHLORIDE 10 MG/1
10 TABLET ORAL DAILY
Status: DISCONTINUED | OUTPATIENT
Start: 2017-01-01 | End: 2017-01-01 | Stop reason: HOSPADM

## 2017-01-01 RX ADMIN — LIDOCAINE 1 PATCH: 50 PATCH CUTANEOUS at 08:45

## 2017-01-01 RX ADMIN — LEVOFLOXACIN 750 MG: 750 INJECTION, SOLUTION INTRAVENOUS at 00:39

## 2017-01-01 RX ADMIN — BUMETANIDE 1 MG: 0.25 INJECTION, SOLUTION INTRAMUSCULAR; INTRAVENOUS at 05:14

## 2017-01-01 RX ADMIN — HEPARIN SODIUM 5000 UNITS: 5000 INJECTION, SOLUTION INTRAVENOUS; SUBCUTANEOUS at 22:50

## 2017-01-01 RX ADMIN — DULOXETINE HYDROCHLORIDE 30 MG: 30 CAPSULE, DELAYED RELEASE ORAL at 08:55

## 2017-01-01 RX ADMIN — CARVEDILOL 6.25 MG: 6.25 TABLET, FILM COATED ORAL at 09:56

## 2017-01-01 RX ADMIN — FLUOXETINE HYDROCHLORIDE 40 MG: 20 CAPSULE ORAL at 09:57

## 2017-01-01 RX ADMIN — CARVEDILOL 6.25 MG: 6.25 TABLET, FILM COATED ORAL at 08:56

## 2017-01-01 RX ADMIN — DOXYCYCLINE HYCLATE 100 MG: 100 CAPSULE ORAL at 20:55

## 2017-01-01 RX ADMIN — POTASSIUM CHLORIDE 20 MEQ: 750 CAPSULE, EXTENDED RELEASE ORAL at 08:56

## 2017-01-01 RX ADMIN — HEPARIN SODIUM 5000 UNITS: 5000 INJECTION, SOLUTION INTRAVENOUS; SUBCUTANEOUS at 20:59

## 2017-01-01 RX ADMIN — DOCUSATE SODIUM 100 MG: 100 CAPSULE, LIQUID FILLED ORAL at 08:52

## 2017-01-01 RX ADMIN — IPRATROPIUM BROMIDE AND ALBUTEROL SULFATE 3 ML: .5; 3 SOLUTION RESPIRATORY (INHALATION) at 19:04

## 2017-01-01 RX ADMIN — Medication 1 DROP: at 08:33

## 2017-01-01 RX ADMIN — VALSARTAN 40 MG: 80 TABLET, FILM COATED ORAL at 09:06

## 2017-01-01 RX ADMIN — Medication 5 ML: at 13:16

## 2017-01-01 RX ADMIN — FLUTICASONE PROPIONATE 2 SPRAY: 50 SPRAY, METERED NASAL at 08:33

## 2017-01-01 RX ADMIN — DOCUSATE SODIUM 100 MG: 100 CAPSULE, LIQUID FILLED ORAL at 17:36

## 2017-01-01 RX ADMIN — VALSARTAN 40 MG: 80 TABLET, FILM COATED ORAL at 21:09

## 2017-01-01 RX ADMIN — TAZOBACTAM SODIUM AND PIPERACILLIN SODIUM 3.38 G: 375; 3 INJECTION, SOLUTION INTRAVENOUS at 09:56

## 2017-01-01 RX ADMIN — GABAPENTIN 100 MG: 100 CAPSULE ORAL at 22:25

## 2017-01-01 RX ADMIN — PANTOPRAZOLE SODIUM 40 MG: 40 TABLET, DELAYED RELEASE ORAL at 05:47

## 2017-01-01 RX ADMIN — DOXYCYCLINE HYCLATE 100 MG: 100 CAPSULE ORAL at 20:38

## 2017-01-01 RX ADMIN — TAZOBACTAM SODIUM AND PIPERACILLIN SODIUM 3.38 G: 375; 3 INJECTION, SOLUTION INTRAVENOUS at 09:58

## 2017-01-01 RX ADMIN — CETIRIZINE HYDROCHLORIDE 10 MG: 10 TABLET, FILM COATED ORAL at 09:57

## 2017-01-01 RX ADMIN — POLYETHYLENE GLYCOL 3350 17 G: 17 POWDER, FOR SOLUTION ORAL at 09:56

## 2017-01-01 RX ADMIN — FLUTICASONE PROPIONATE 2 SPRAY: 50 SPRAY, METERED NASAL at 09:00

## 2017-01-01 RX ADMIN — DOCUSATE SODIUM 200 MG: 100 CAPSULE, LIQUID FILLED ORAL at 09:58

## 2017-01-01 RX ADMIN — BUMETANIDE 1 MG: 0.25 INJECTION, SOLUTION INTRAMUSCULAR; INTRAVENOUS at 21:10

## 2017-01-01 RX ADMIN — GABAPENTIN 100 MG: 100 CAPSULE ORAL at 20:59

## 2017-01-01 RX ADMIN — HEPARIN SODIUM 5000 UNITS: 5000 INJECTION, SOLUTION INTRAVENOUS; SUBCUTANEOUS at 20:38

## 2017-01-01 RX ADMIN — DULOXETINE HYDROCHLORIDE 30 MG: 30 CAPSULE, DELAYED RELEASE ORAL at 09:57

## 2017-01-01 RX ADMIN — HEPARIN SODIUM 5000 UNITS: 5000 INJECTION, SOLUTION INTRAVENOUS; SUBCUTANEOUS at 09:57

## 2017-01-01 RX ADMIN — CETIRIZINE HYDROCHLORIDE 10 MG: 10 TABLET, FILM COATED ORAL at 09:00

## 2017-01-01 RX ADMIN — TAZOBACTAM SODIUM AND PIPERACILLIN SODIUM 3.38 G: 375; 3 INJECTION, SOLUTION INTRAVENOUS at 15:53

## 2017-01-01 RX ADMIN — VALSARTAN 40 MG: 80 TABLET, FILM COATED ORAL at 08:56

## 2017-01-01 RX ADMIN — GUAIFENESIN AND DEXTROMETHORPHAN HYDROBROMIDE 1 TABLET: 600; 30 TABLET, EXTENDED RELEASE ORAL at 17:36

## 2017-01-01 RX ADMIN — CARVEDILOL 6.25 MG: 6.25 TABLET, FILM COATED ORAL at 08:32

## 2017-01-01 RX ADMIN — CARVEDILOL 6.25 MG: 6.25 TABLET, FILM COATED ORAL at 09:06

## 2017-01-01 RX ADMIN — GUAIFENESIN AND DEXTROMETHORPHAN HYDROBROMIDE 1 TABLET: 600; 30 TABLET, EXTENDED RELEASE ORAL at 08:55

## 2017-01-01 RX ADMIN — BRIMONIDINE TARTRATE 1 DROP: 1.5 SOLUTION OPHTHALMIC at 17:35

## 2017-01-01 RX ADMIN — ASPIRIN 325 MG ORAL TABLET 325 MG: 325 PILL ORAL at 09:57

## 2017-01-01 RX ADMIN — PANTOPRAZOLE SODIUM 40 MG: 40 TABLET, DELAYED RELEASE ORAL at 06:47

## 2017-01-01 RX ADMIN — HEPARIN SODIUM 5000 UNITS: 5000 INJECTION, SOLUTION INTRAVENOUS; SUBCUTANEOUS at 09:05

## 2017-01-01 RX ADMIN — CETIRIZINE HYDROCHLORIDE 10 MG: 10 TABLET, FILM COATED ORAL at 09:58

## 2017-01-01 RX ADMIN — ASPIRIN 325 MG ORAL TABLET 325 MG: 325 PILL ORAL at 09:00

## 2017-01-01 RX ADMIN — DOCUSATE SODIUM 100 MG: 100 CAPSULE, LIQUID FILLED ORAL at 17:30

## 2017-01-01 RX ADMIN — VALSARTAN 40 MG: 80 TABLET, FILM COATED ORAL at 20:38

## 2017-01-01 RX ADMIN — LIDOCAINE 1 PATCH: 50 PATCH CUTANEOUS at 08:47

## 2017-01-01 RX ADMIN — TAZOBACTAM SODIUM AND PIPERACILLIN SODIUM 3.38 G: 375; 3 INJECTION, SOLUTION INTRAVENOUS at 16:21

## 2017-01-01 RX ADMIN — TAZOBACTAM SODIUM AND PIPERACILLIN SODIUM 3.38 G: 375; 3 INJECTION, SOLUTION INTRAVENOUS at 09:02

## 2017-01-01 RX ADMIN — BACITRACIN, NEOMYCIN, POLYMYXIN B 1 APPLICATION: 400; 3.5; 5 OINTMENT TOPICAL at 08:52

## 2017-01-01 RX ADMIN — CARVEDILOL 6.25 MG: 6.25 TABLET, FILM COATED ORAL at 17:55

## 2017-01-01 RX ADMIN — NYSTATIN: 100000 POWDER TOPICAL at 21:44

## 2017-01-01 RX ADMIN — NYSTATIN: 100000 POWDER TOPICAL at 09:15

## 2017-01-01 RX ADMIN — GUAIFENESIN AND DEXTROMETHORPHAN HYDROBROMIDE 1 TABLET: 600; 30 TABLET, EXTENDED RELEASE ORAL at 10:37

## 2017-01-01 RX ADMIN — POTASSIUM CHLORIDE 40 MEQ: 750 CAPSULE, EXTENDED RELEASE ORAL at 17:15

## 2017-01-01 RX ADMIN — IPRATROPIUM BROMIDE AND ALBUTEROL SULFATE 3 ML: .5; 3 SOLUTION RESPIRATORY (INHALATION) at 01:24

## 2017-01-01 RX ADMIN — NYSTATIN: 100000 POWDER TOPICAL at 09:20

## 2017-01-01 RX ADMIN — FLUOXETINE HYDROCHLORIDE 40 MG: 20 CAPSULE ORAL at 09:22

## 2017-01-01 RX ADMIN — Medication 5 ML: at 18:42

## 2017-01-01 RX ADMIN — IPRATROPIUM BROMIDE AND ALBUTEROL SULFATE 3 ML: .5; 3 SOLUTION RESPIRATORY (INHALATION) at 04:13

## 2017-01-01 RX ADMIN — BRIMONIDINE TARTRATE 1 DROP: 1.5 SOLUTION OPHTHALMIC at 09:57

## 2017-01-01 RX ADMIN — Medication 1 DROP: at 08:54

## 2017-01-01 RX ADMIN — DULOXETINE HYDROCHLORIDE 30 MG: 30 CAPSULE, DELAYED RELEASE ORAL at 08:38

## 2017-01-01 RX ADMIN — TAZOBACTAM SODIUM AND PIPERACILLIN SODIUM 3.38 G: 375; 3 INJECTION, SOLUTION INTRAVENOUS at 04:00

## 2017-01-01 RX ADMIN — PANTOPRAZOLE SODIUM 40 MG: 40 TABLET, DELAYED RELEASE ORAL at 06:50

## 2017-01-01 RX ADMIN — CETIRIZINE HYDROCHLORIDE 10 MG: 10 TABLET, FILM COATED ORAL at 08:32

## 2017-01-01 RX ADMIN — DOXYCYCLINE HYCLATE 100 MG: 100 CAPSULE ORAL at 20:40

## 2017-01-01 RX ADMIN — GABAPENTIN 100 MG: 100 CAPSULE ORAL at 21:10

## 2017-01-01 RX ADMIN — VALSARTAN 40 MG: 80 TABLET, FILM COATED ORAL at 21:01

## 2017-01-01 RX ADMIN — TORSEMIDE 20 MG: 20 TABLET ORAL at 11:57

## 2017-01-01 RX ADMIN — FAMOTIDINE 20 MG: 20 TABLET ORAL at 08:51

## 2017-01-01 RX ADMIN — HEPARIN SODIUM 5000 UNITS: 5000 INJECTION, SOLUTION INTRAVENOUS; SUBCUTANEOUS at 09:01

## 2017-01-01 RX ADMIN — CARVEDILOL 6.25 MG: 6.25 TABLET, FILM COATED ORAL at 16:19

## 2017-01-01 RX ADMIN — BUMETANIDE 1 MG: 0.25 INJECTION, SOLUTION INTRAMUSCULAR; INTRAVENOUS at 05:00

## 2017-01-01 RX ADMIN — TAZOBACTAM SODIUM AND PIPERACILLIN SODIUM 3.38 G: 375; 3 INJECTION, SOLUTION INTRAVENOUS at 21:09

## 2017-01-01 RX ADMIN — TAZOBACTAM SODIUM AND PIPERACILLIN SODIUM 3.38 G: 375; 3 INJECTION, SOLUTION INTRAVENOUS at 20:54

## 2017-01-01 RX ADMIN — PANTOPRAZOLE SODIUM 40 MG: 40 TABLET, DELAYED RELEASE ORAL at 05:53

## 2017-01-01 RX ADMIN — DOXYCYCLINE HYCLATE 100 MG: 100 CAPSULE ORAL at 08:54

## 2017-01-01 RX ADMIN — NYSTATIN: 100000 POWDER TOPICAL at 08:51

## 2017-01-01 RX ADMIN — HEPARIN SODIUM 5000 UNITS: 5000 INJECTION, SOLUTION INTRAVENOUS; SUBCUTANEOUS at 21:34

## 2017-01-01 RX ADMIN — BRIMONIDINE TARTRATE 1 DROP: 1.5 SOLUTION OPHTHALMIC at 17:32

## 2017-01-01 RX ADMIN — HEPARIN SODIUM 5000 UNITS: 5000 INJECTION, SOLUTION INTRAVENOUS; SUBCUTANEOUS at 08:48

## 2017-01-01 RX ADMIN — CARVEDILOL 6.25 MG: 6.25 TABLET, FILM COATED ORAL at 18:04

## 2017-01-01 RX ADMIN — HEPARIN SODIUM 5000 UNITS: 5000 INJECTION, SOLUTION INTRAVENOUS; SUBCUTANEOUS at 08:31

## 2017-01-01 RX ADMIN — FLUOXETINE HYDROCHLORIDE 40 MG: 20 CAPSULE ORAL at 08:48

## 2017-01-01 RX ADMIN — DOXYCYCLINE HYCLATE 100 MG: 100 CAPSULE ORAL at 22:51

## 2017-01-01 RX ADMIN — GUAIFENESIN AND DEXTROMETHORPHAN HYDROBROMIDE 1 TABLET: 600; 30 TABLET, EXTENDED RELEASE ORAL at 08:48

## 2017-01-01 RX ADMIN — LABETALOL HYDROCHLORIDE 20 MG: 5 INJECTION, SOLUTION INTRAVENOUS at 10:28

## 2017-01-01 RX ADMIN — VALSARTAN 40 MG: 80 TABLET, FILM COATED ORAL at 08:32

## 2017-01-01 RX ADMIN — CETIRIZINE HYDROCHLORIDE 10 MG: 10 TABLET, FILM COATED ORAL at 08:54

## 2017-01-01 RX ADMIN — DOXYCYCLINE HYCLATE 100 MG: 100 CAPSULE ORAL at 09:58

## 2017-01-01 RX ADMIN — TAZOBACTAM SODIUM AND PIPERACILLIN SODIUM 3.38 G: 375; 3 INJECTION, SOLUTION INTRAVENOUS at 15:47

## 2017-01-01 RX ADMIN — CARVEDILOL 6.25 MG: 6.25 TABLET, FILM COATED ORAL at 17:29

## 2017-01-01 RX ADMIN — TAZOBACTAM SODIUM AND PIPERACILLIN SODIUM 3.38 G: 375; 3 INJECTION, SOLUTION INTRAVENOUS at 21:07

## 2017-01-01 RX ADMIN — TAZOBACTAM SODIUM AND PIPERACILLIN SODIUM 3.38 G: 375; 3 INJECTION, SOLUTION INTRAVENOUS at 09:20

## 2017-01-01 RX ADMIN — VALSARTAN 40 MG: 80 TABLET, FILM COATED ORAL at 09:00

## 2017-01-01 RX ADMIN — LIDOCAINE 1 PATCH: 50 PATCH CUTANEOUS at 08:52

## 2017-01-01 RX ADMIN — TAZOBACTAM SODIUM AND PIPERACILLIN SODIUM 3.38 G: 375; 3 INJECTION, SOLUTION INTRAVENOUS at 16:14

## 2017-01-01 RX ADMIN — FLUTICASONE PROPIONATE 2 SPRAY: 50 SPRAY, METERED NASAL at 08:57

## 2017-01-01 RX ADMIN — CYANOCOBALAMIN TAB 1000 MCG 1000 MCG: 1000 TAB at 09:22

## 2017-01-01 RX ADMIN — CARVEDILOL 6.25 MG: 6.25 TABLET, FILM COATED ORAL at 18:03

## 2017-01-01 RX ADMIN — TAZOBACTAM SODIUM AND PIPERACILLIN SODIUM 3.38 G: 375; 3 INJECTION, SOLUTION INTRAVENOUS at 10:00

## 2017-01-01 RX ADMIN — TAZOBACTAM SODIUM AND PIPERACILLIN SODIUM 3.38 G: 375; 3 INJECTION, SOLUTION INTRAVENOUS at 20:39

## 2017-01-01 RX ADMIN — PANTOPRAZOLE SODIUM 40 MG: 40 TABLET, DELAYED RELEASE ORAL at 05:14

## 2017-01-01 RX ADMIN — Medication 1 DROP: at 08:49

## 2017-01-01 RX ADMIN — HEPARIN SODIUM 5000 UNITS: 5000 INJECTION, SOLUTION INTRAVENOUS; SUBCUTANEOUS at 20:55

## 2017-01-01 RX ADMIN — VALSARTAN 40 MG: 80 TABLET, FILM COATED ORAL at 21:29

## 2017-01-01 RX ADMIN — ACETAMINOPHEN 400 MCG: 400 TABLET ORAL at 09:26

## 2017-01-01 RX ADMIN — LIDOCAINE 1 PATCH: 50 PATCH CUTANEOUS at 08:57

## 2017-01-01 RX ADMIN — DULOXETINE HYDROCHLORIDE 30 MG: 30 CAPSULE, DELAYED RELEASE ORAL at 09:06

## 2017-01-01 RX ADMIN — DIGOXIN 125 MCG: 125 TABLET ORAL at 11:45

## 2017-01-01 RX ADMIN — TAZOBACTAM SODIUM AND PIPERACILLIN SODIUM 3.38 G: 375; 3 INJECTION, SOLUTION INTRAVENOUS at 10:37

## 2017-01-01 RX ADMIN — TAZOBACTAM SODIUM AND PIPERACILLIN SODIUM 3.38 G: 375; 3 INJECTION, SOLUTION INTRAVENOUS at 22:00

## 2017-01-01 RX ADMIN — LIDOCAINE 1 PATCH: 50 PATCH CUTANEOUS at 17:58

## 2017-01-01 RX ADMIN — TORSEMIDE 20 MG: 20 TABLET ORAL at 08:52

## 2017-01-01 RX ADMIN — HEPARIN SODIUM 5000 UNITS: 5000 INJECTION, SOLUTION INTRAVENOUS; SUBCUTANEOUS at 08:54

## 2017-01-01 RX ADMIN — LIDOCAINE 1 PATCH: 50 PATCH CUTANEOUS at 09:21

## 2017-01-01 RX ADMIN — DULOXETINE HYDROCHLORIDE 30 MG: 30 CAPSULE, DELAYED RELEASE ORAL at 08:56

## 2017-01-01 RX ADMIN — FUROSEMIDE 40 MG: 10 INJECTION, SOLUTION INTRAMUSCULAR; INTRAVENOUS at 17:06

## 2017-01-01 RX ADMIN — ASPIRIN 325 MG ORAL TABLET 325 MG: 325 PILL ORAL at 08:54

## 2017-01-01 RX ADMIN — DOCUSATE SODIUM 100 MG: 100 CAPSULE, LIQUID FILLED ORAL at 08:38

## 2017-01-01 RX ADMIN — PANTOPRAZOLE SODIUM 40 MG: 40 TABLET, DELAYED RELEASE ORAL at 05:00

## 2017-01-01 RX ADMIN — GABAPENTIN 100 MG: 100 CAPSULE ORAL at 00:39

## 2017-01-01 RX ADMIN — VALSARTAN 40 MG: 80 TABLET, FILM COATED ORAL at 09:25

## 2017-01-01 RX ADMIN — Medication 1 DROP: at 08:57

## 2017-01-01 RX ADMIN — PANTOPRAZOLE SODIUM 40 MG: 40 TABLET, DELAYED RELEASE ORAL at 05:38

## 2017-01-01 RX ADMIN — LOSARTAN POTASSIUM 100 MG: 50 TABLET, FILM COATED ORAL at 09:58

## 2017-01-01 RX ADMIN — FAMOTIDINE 20 MG: 20 TABLET ORAL at 17:35

## 2017-01-01 RX ADMIN — ASPIRIN 325 MG ORAL TABLET 325 MG: 325 PILL ORAL at 08:56

## 2017-01-01 RX ADMIN — DULOXETINE HYDROCHLORIDE 30 MG: 30 CAPSULE, DELAYED RELEASE ORAL at 08:52

## 2017-01-01 RX ADMIN — CARVEDILOL 6.25 MG: 6.25 TABLET, FILM COATED ORAL at 18:42

## 2017-01-01 RX ADMIN — BRIMONIDINE TARTRATE 1 DROP: 1.5 SOLUTION OPHTHALMIC at 17:23

## 2017-01-01 RX ADMIN — CETIRIZINE HYDROCHLORIDE 10 MG: 10 TABLET, FILM COATED ORAL at 09:01

## 2017-01-01 RX ADMIN — ACETAMINOPHEN 650 MG: 325 TABLET, FILM COATED ORAL at 21:02

## 2017-01-01 RX ADMIN — TORSEMIDE 20 MG: 20 TABLET ORAL at 10:43

## 2017-01-01 RX ADMIN — DOXYCYCLINE HYCLATE 100 MG: 100 CAPSULE ORAL at 21:10

## 2017-01-01 RX ADMIN — TAZOBACTAM SODIUM AND PIPERACILLIN SODIUM 3.38 G: 375; 3 INJECTION, SOLUTION INTRAVENOUS at 03:57

## 2017-01-01 RX ADMIN — LIDOCAINE 1 PATCH: 50 PATCH CUTANEOUS at 09:55

## 2017-01-01 RX ADMIN — GUAIFENESIN AND DEXTROMETHORPHAN HYDROBROMIDE 1 TABLET: 600; 30 TABLET, EXTENDED RELEASE ORAL at 08:40

## 2017-01-01 RX ADMIN — GABAPENTIN 100 MG: 100 CAPSULE ORAL at 22:51

## 2017-01-01 RX ADMIN — POTASSIUM CHLORIDE 20 MEQ: 750 CAPSULE, EXTENDED RELEASE ORAL at 08:48

## 2017-01-01 RX ADMIN — BRIMONIDINE TARTRATE 1 DROP: 1.5 SOLUTION OPHTHALMIC at 08:40

## 2017-01-01 RX ADMIN — VALSARTAN 40 MG: 80 TABLET, FILM COATED ORAL at 08:53

## 2017-01-01 RX ADMIN — POTASSIUM CHLORIDE 20 MEQ: 750 CAPSULE, EXTENDED RELEASE ORAL at 08:32

## 2017-01-01 RX ADMIN — BRIMONIDINE TARTRATE 1 DROP: 1.5 SOLUTION OPHTHALMIC at 18:03

## 2017-01-01 RX ADMIN — CARVEDILOL 6.25 MG: 6.25 TABLET, FILM COATED ORAL at 17:23

## 2017-01-01 RX ADMIN — CARVEDILOL 6.25 MG: 6.25 TABLET, FILM COATED ORAL at 08:53

## 2017-01-01 RX ADMIN — FLUTICASONE PROPIONATE 2 SPRAY: 50 SPRAY, METERED NASAL at 08:37

## 2017-01-01 RX ADMIN — BRIMONIDINE TARTRATE 1 DROP: 1.5 SOLUTION OPHTHALMIC at 17:38

## 2017-01-01 RX ADMIN — GUAIFENESIN AND DEXTROMETHORPHAN HYDROBROMIDE 1 TABLET: 600; 30 TABLET, EXTENDED RELEASE ORAL at 17:55

## 2017-01-01 RX ADMIN — ACETAMINOPHEN 650 MG: 325 TABLET, FILM COATED ORAL at 10:29

## 2017-01-01 RX ADMIN — HEPARIN SODIUM 5000 UNITS: 5000 INJECTION, SOLUTION INTRAVENOUS; SUBCUTANEOUS at 08:56

## 2017-01-01 RX ADMIN — GABAPENTIN 100 MG: 100 CAPSULE ORAL at 20:40

## 2017-01-01 RX ADMIN — TAZOBACTAM SODIUM AND PIPERACILLIN SODIUM 3.38 G: 375; 3 INJECTION, SOLUTION INTRAVENOUS at 05:27

## 2017-01-01 RX ADMIN — TAZOBACTAM SODIUM AND PIPERACILLIN SODIUM 3.38 G: 375; 3 INJECTION, SOLUTION INTRAVENOUS at 03:47

## 2017-01-01 RX ADMIN — Medication 1 DROP: at 21:29

## 2017-01-01 RX ADMIN — CARVEDILOL 3.12 MG: 3.12 TABLET, FILM COATED ORAL at 09:58

## 2017-01-01 RX ADMIN — IPRATROPIUM BROMIDE AND ALBUTEROL SULFATE 3 ML: .5; 3 SOLUTION RESPIRATORY (INHALATION) at 04:01

## 2017-01-01 RX ADMIN — GABAPENTIN 100 MG: 100 CAPSULE ORAL at 21:05

## 2017-01-01 RX ADMIN — DOXYCYCLINE HYCLATE 100 MG: 100 CAPSULE ORAL at 08:32

## 2017-01-01 RX ADMIN — BRIMONIDINE TARTRATE 1 DROP: 1.5 SOLUTION OPHTHALMIC at 08:53

## 2017-01-01 RX ADMIN — METOLAZONE 5 MG: 5 TABLET ORAL at 10:37

## 2017-01-01 RX ADMIN — GUAIFENESIN AND DEXTROMETHORPHAN HYDROBROMIDE 1 TABLET: 600; 30 TABLET, EXTENDED RELEASE ORAL at 18:05

## 2017-01-01 RX ADMIN — CARVEDILOL 6.25 MG: 6.25 TABLET, FILM COATED ORAL at 08:52

## 2017-01-01 RX ADMIN — FLUTICASONE PROPIONATE 2 SPRAY: 50 SPRAY, METERED NASAL at 09:57

## 2017-01-01 RX ADMIN — HEPARIN SODIUM 5000 UNITS: 5000 INJECTION, SOLUTION INTRAVENOUS; SUBCUTANEOUS at 20:39

## 2017-01-01 RX ADMIN — DULOXETINE HYDROCHLORIDE 30 MG: 30 CAPSULE, DELAYED RELEASE ORAL at 09:01

## 2017-01-01 RX ADMIN — CARVEDILOL 6.25 MG: 6.25 TABLET, FILM COATED ORAL at 08:54

## 2017-01-01 RX ADMIN — FLUOXETINE HYDROCHLORIDE 40 MG: 20 CAPSULE ORAL at 08:32

## 2017-01-01 RX ADMIN — DULOXETINE HYDROCHLORIDE 30 MG: 30 CAPSULE, DELAYED RELEASE ORAL at 08:34

## 2017-01-01 RX ADMIN — PANTOPRAZOLE SODIUM 40 MG: 40 TABLET, DELAYED RELEASE ORAL at 05:27

## 2017-01-01 RX ADMIN — LIDOCAINE 1 PATCH: 50 PATCH CUTANEOUS at 09:02

## 2017-01-01 RX ADMIN — BUMETANIDE 1 MG: 0.25 INJECTION, SOLUTION INTRAMUSCULAR; INTRAVENOUS at 06:01

## 2017-01-01 RX ADMIN — DULOXETINE HYDROCHLORIDE 30 MG: 30 CAPSULE, DELAYED RELEASE ORAL at 09:56

## 2017-01-01 RX ADMIN — CARVEDILOL 6.25 MG: 6.25 TABLET, FILM COATED ORAL at 17:35

## 2017-01-01 RX ADMIN — NYSTATIN: 100000 POWDER TOPICAL at 20:41

## 2017-01-01 RX ADMIN — BRIMONIDINE TARTRATE 1 DROP: 1.5 SOLUTION OPHTHALMIC at 09:25

## 2017-01-01 RX ADMIN — POTASSIUM CHLORIDE 20 MEQ: 750 CAPSULE, EXTENDED RELEASE ORAL at 18:42

## 2017-01-01 RX ADMIN — CARVEDILOL 6.25 MG: 6.25 TABLET, FILM COATED ORAL at 17:38

## 2017-01-01 RX ADMIN — DULOXETINE HYDROCHLORIDE 30 MG: 30 CAPSULE, DELAYED RELEASE ORAL at 08:48

## 2017-01-01 RX ADMIN — TORSEMIDE 20 MG: 20 TABLET ORAL at 09:22

## 2017-01-01 RX ADMIN — NYSTATIN: 100000 POWDER TOPICAL at 08:55

## 2017-01-01 RX ADMIN — Medication 5 ML: at 08:57

## 2017-01-01 RX ADMIN — ONDANSETRON 4 MG: 2 INJECTION INTRAMUSCULAR; INTRAVENOUS at 08:51

## 2017-01-01 RX ADMIN — NYSTATIN: 100000 POWDER TOPICAL at 21:03

## 2017-01-01 RX ADMIN — CARVEDILOL 12.5 MG: 12.5 TABLET, FILM COATED ORAL at 14:48

## 2017-01-01 RX ADMIN — NYSTATIN: 100000 POWDER TOPICAL at 21:05

## 2017-01-01 RX ADMIN — FLUOXETINE HYDROCHLORIDE 40 MG: 20 CAPSULE ORAL at 08:56

## 2017-01-01 RX ADMIN — VALSARTAN 40 MG: 80 TABLET, FILM COATED ORAL at 10:00

## 2017-01-01 RX ADMIN — TAZOBACTAM SODIUM AND PIPERACILLIN SODIUM 3.38 G: 375; 3 INJECTION, SOLUTION INTRAVENOUS at 04:52

## 2017-01-01 RX ADMIN — DULOXETINE HYDROCHLORIDE 30 MG: 30 CAPSULE, DELAYED RELEASE ORAL at 12:36

## 2017-01-01 RX ADMIN — GABAPENTIN 100 MG: 100 CAPSULE ORAL at 21:02

## 2017-01-01 RX ADMIN — DOCUSATE SODIUM 200 MG: 100 CAPSULE, LIQUID FILLED ORAL at 09:57

## 2017-01-01 RX ADMIN — ALBUTEROL SULFATE 2.5 MG: 2.5 SOLUTION RESPIRATORY (INHALATION) at 17:31

## 2017-01-01 RX ADMIN — PANTOPRAZOLE SODIUM 40 MG: 40 TABLET, DELAYED RELEASE ORAL at 05:58

## 2017-01-01 RX ADMIN — LIDOCAINE 1 PATCH: 50 PATCH CUTANEOUS at 09:04

## 2017-01-01 RX ADMIN — POLYETHYLENE GLYCOL 3350 17 G: 17 POWDER, FOR SOLUTION ORAL at 09:57

## 2017-01-01 RX ADMIN — VALSARTAN 40 MG: 80 TABLET, FILM COATED ORAL at 08:48

## 2017-01-01 RX ADMIN — VALSARTAN 40 MG: 80 TABLET, FILM COATED ORAL at 21:02

## 2017-01-01 RX ADMIN — GUAIFENESIN AND DEXTROMETHORPHAN HYDROBROMIDE 1 TABLET: 600; 30 TABLET, EXTENDED RELEASE ORAL at 08:52

## 2017-01-01 RX ADMIN — DOXYCYCLINE HYCLATE 100 MG: 100 CAPSULE ORAL at 09:01

## 2017-01-01 RX ADMIN — GABAPENTIN 100 MG: 100 CAPSULE ORAL at 21:34

## 2017-01-01 RX ADMIN — BRIMONIDINE TARTRATE 1 DROP: 1.5 SOLUTION OPHTHALMIC at 09:00

## 2017-01-01 RX ADMIN — BUMETANIDE 1 MG: 0.25 INJECTION, SOLUTION INTRAMUSCULAR; INTRAVENOUS at 09:05

## 2017-01-01 RX ADMIN — DOXYCYCLINE HYCLATE 100 MG: 100 CAPSULE ORAL at 09:57

## 2017-01-01 RX ADMIN — CETIRIZINE HYDROCHLORIDE 10 MG: 10 TABLET, FILM COATED ORAL at 09:06

## 2017-01-01 RX ADMIN — NYSTATIN: 100000 POWDER TOPICAL at 21:35

## 2017-01-01 RX ADMIN — POTASSIUM CHLORIDE 40 MEQ: 750 CAPSULE, EXTENDED RELEASE ORAL at 20:58

## 2017-01-01 RX ADMIN — HEPARIN SODIUM 5000 UNITS: 5000 INJECTION, SOLUTION INTRAVENOUS; SUBCUTANEOUS at 20:58

## 2017-01-01 RX ADMIN — ASPIRIN 325 MG ORAL TABLET 325 MG: 325 PILL ORAL at 09:01

## 2017-01-01 RX ADMIN — PANTOPRAZOLE SODIUM 40 MG: 40 TABLET, DELAYED RELEASE ORAL at 06:01

## 2017-01-01 RX ADMIN — CYANOCOBALAMIN TAB 1000 MCG 1000 MCG: 1000 TAB at 08:38

## 2017-01-01 RX ADMIN — TAZOBACTAM SODIUM AND PIPERACILLIN SODIUM 4.5 G: 500; 4 INJECTION, SOLUTION INTRAVENOUS at 21:03

## 2017-01-01 RX ADMIN — BUMETANIDE 1 MG: 0.25 INJECTION, SOLUTION INTRAMUSCULAR; INTRAVENOUS at 20:38

## 2017-01-01 RX ADMIN — DULOXETINE HYDROCHLORIDE 30 MG: 30 CAPSULE, DELAYED RELEASE ORAL at 08:32

## 2017-01-01 RX ADMIN — CETIRIZINE HYDROCHLORIDE 5 MG: 1 SOLUTION ORAL at 08:39

## 2017-01-01 RX ADMIN — CARVEDILOL 6.25 MG: 6.25 TABLET, FILM COATED ORAL at 09:22

## 2017-01-01 RX ADMIN — VANCOMYCIN HYDROCHLORIDE 1000 MG: 1 INJECTION, SOLUTION INTRAVENOUS at 22:26

## 2017-01-01 RX ADMIN — FLUOXETINE HYDROCHLORIDE 40 MG: 20 CAPSULE ORAL at 08:38

## 2017-01-01 RX ADMIN — TAZOBACTAM SODIUM AND PIPERACILLIN SODIUM 3.38 G: 375; 3 INJECTION, SOLUTION INTRAVENOUS at 15:38

## 2017-01-01 RX ADMIN — GUAIFENESIN AND DEXTROMETHORPHAN HYDROBROMIDE 1 TABLET: 600; 30 TABLET, EXTENDED RELEASE ORAL at 08:32

## 2017-01-01 RX ADMIN — CARVEDILOL 6.25 MG: 6.25 TABLET, FILM COATED ORAL at 08:48

## 2017-01-01 RX ADMIN — BRIMONIDINE TARTRATE 1 DROP: 1.5 SOLUTION OPHTHALMIC at 17:16

## 2017-01-01 RX ADMIN — POTASSIUM CHLORIDE 40 MEQ: 750 CAPSULE, EXTENDED RELEASE ORAL at 17:55

## 2017-01-01 RX ADMIN — FLUTICASONE PROPIONATE 2 SPRAY: 50 SPRAY, METERED NASAL at 08:54

## 2017-01-01 RX ADMIN — FLUTICASONE PROPIONATE 2 SPRAY: 50 SPRAY, METERED NASAL at 08:32

## 2017-01-01 RX ADMIN — NYSTATIN: 100000 POWDER TOPICAL at 22:25

## 2017-01-01 RX ADMIN — FLUTICASONE PROPIONATE 2 SPRAY: 50 SPRAY, METERED NASAL at 09:56

## 2017-01-01 RX ADMIN — HEPARIN SODIUM 5000 UNITS: 5000 INJECTION, SOLUTION INTRAVENOUS; SUBCUTANEOUS at 08:32

## 2017-01-01 RX ADMIN — ACETAMINOPHEN 400 MCG: 400 TABLET ORAL at 08:40

## 2017-01-01 RX ADMIN — FLUOXETINE HYDROCHLORIDE 40 MG: 20 CAPSULE ORAL at 09:56

## 2017-01-01 RX ADMIN — DOXYCYCLINE HYCLATE 100 MG: 100 CAPSULE ORAL at 15:46

## 2017-01-01 RX ADMIN — CARVEDILOL 6.25 MG: 6.25 TABLET, FILM COATED ORAL at 08:38

## 2017-01-01 RX ADMIN — VALSARTAN 40 MG: 80 TABLET, FILM COATED ORAL at 22:24

## 2017-01-01 RX ADMIN — VALSARTAN 40 MG: 80 TABLET, FILM COATED ORAL at 09:02

## 2017-01-01 RX ADMIN — NYSTATIN: 100000 POWDER TOPICAL at 08:44

## 2017-01-01 RX ADMIN — Medication 5 ML: at 21:05

## 2017-01-01 RX ADMIN — ACETAMINOPHEN 650 MG: 325 TABLET, FILM COATED ORAL at 02:20

## 2017-01-01 RX ADMIN — NYSTATIN: 100000 POWDER TOPICAL at 08:52

## 2017-01-01 RX ADMIN — ACETAMINOPHEN 650 MG: 325 TABLET, FILM COATED ORAL at 14:03

## 2017-01-01 RX ADMIN — BRIMONIDINE TARTRATE 1 DROP: 1.5 SOLUTION OPHTHALMIC at 09:02

## 2017-01-01 RX ADMIN — CARVEDILOL 6.25 MG: 6.25 TABLET, FILM COATED ORAL at 09:15

## 2017-01-01 RX ADMIN — BRIMONIDINE TARTRATE 1 DROP: 1.5 SOLUTION OPHTHALMIC at 17:30

## 2017-01-01 RX ADMIN — BUMETANIDE 1 MG: 0.25 INJECTION, SOLUTION INTRAMUSCULAR; INTRAVENOUS at 08:54

## 2017-01-01 RX ADMIN — FLUOXETINE HYDROCHLORIDE 40 MG: 20 CAPSULE ORAL at 09:06

## 2017-01-01 RX ADMIN — CARVEDILOL 6.25 MG: 6.25 TABLET, FILM COATED ORAL at 00:39

## 2017-01-01 RX ADMIN — TAZOBACTAM SODIUM AND PIPERACILLIN SODIUM 3.38 G: 375; 3 INJECTION, SOLUTION INTRAVENOUS at 10:29

## 2017-01-01 RX ADMIN — DOXYCYCLINE HYCLATE 100 MG: 100 CAPSULE ORAL at 20:59

## 2017-01-01 RX ADMIN — DOCUSATE SODIUM 100 MG: 100 CAPSULE, LIQUID FILLED ORAL at 09:22

## 2017-01-01 RX ADMIN — CARVEDILOL 6.25 MG: 6.25 TABLET, FILM COATED ORAL at 17:16

## 2017-01-01 RX ADMIN — GUAIFENESIN AND DEXTROMETHORPHAN HYDROBROMIDE 1 TABLET: 600; 30 TABLET, EXTENDED RELEASE ORAL at 09:25

## 2017-01-01 RX ADMIN — Medication 1 DROP: at 21:04

## 2017-01-01 RX ADMIN — BRIMONIDINE TARTRATE 1 DROP: 1.5 SOLUTION OPHTHALMIC at 16:20

## 2017-01-01 RX ADMIN — DULOXETINE HYDROCHLORIDE 30 MG: 30 CAPSULE, DELAYED RELEASE ORAL at 08:59

## 2017-01-01 RX ADMIN — TORSEMIDE 20 MG: 20 TABLET ORAL at 08:33

## 2017-01-01 RX ADMIN — FLUTICASONE PROPIONATE 2 SPRAY: 50 SPRAY, METERED NASAL at 09:05

## 2017-01-01 RX ADMIN — HEPARIN SODIUM 5000 UNITS: 5000 INJECTION, SOLUTION INTRAVENOUS; SUBCUTANEOUS at 21:10

## 2017-01-01 RX ADMIN — BRIMONIDINE TARTRATE 1 DROP: 1.5 SOLUTION OPHTHALMIC at 09:56

## 2017-01-01 RX ADMIN — FLUOXETINE HYDROCHLORIDE 40 MG: 20 CAPSULE ORAL at 08:51

## 2017-01-01 RX ADMIN — FLUTICASONE PROPIONATE 2 SPRAY: 50 SPRAY, METERED NASAL at 08:53

## 2017-01-01 RX ADMIN — CETIRIZINE HYDROCHLORIDE 10 MG: 10 TABLET, FILM COATED ORAL at 08:56

## 2017-01-01 RX ADMIN — FLUOXETINE HYDROCHLORIDE 40 MG: 20 CAPSULE ORAL at 08:59

## 2017-01-01 RX ADMIN — CETIRIZINE HYDROCHLORIDE 5 MG: 1 SOLUTION ORAL at 13:11

## 2017-01-01 RX ADMIN — VALSARTAN 40 MG: 80 TABLET, FILM COATED ORAL at 21:43

## 2017-01-01 RX ADMIN — LIDOCAINE 1 PATCH: 50 PATCH CUTANEOUS at 08:33

## 2017-01-01 RX ADMIN — BUMETANIDE 1 MG: 0.25 INJECTION, SOLUTION INTRAMUSCULAR; INTRAVENOUS at 06:47

## 2017-01-01 RX ADMIN — ASPIRIN 325 MG ORAL TABLET 325 MG: 325 PILL ORAL at 08:32

## 2017-01-01 RX ADMIN — CETIRIZINE HYDROCHLORIDE 10 MG: 10 TABLET, FILM COATED ORAL at 08:48

## 2017-01-01 RX ADMIN — GUAIFENESIN AND DEXTROMETHORPHAN HYDROBROMIDE 1 TABLET: 600; 30 TABLET, EXTENDED RELEASE ORAL at 00:10

## 2017-01-01 RX ADMIN — CETIRIZINE HYDROCHLORIDE 5 MG: 1 SOLUTION ORAL at 17:35

## 2017-01-01 RX ADMIN — VALSARTAN 40 MG: 80 TABLET, FILM COATED ORAL at 08:59

## 2017-01-01 RX ADMIN — ASPIRIN 325 MG ORAL TABLET 325 MG: 325 PILL ORAL at 08:48

## 2017-01-01 RX ADMIN — VALSARTAN 40 MG: 80 TABLET, FILM COATED ORAL at 21:04

## 2017-01-01 RX ADMIN — FLUTICASONE PROPIONATE 2 SPRAY: 50 SPRAY, METERED NASAL at 09:01

## 2017-01-01 RX ADMIN — TORSEMIDE 20 MG: 20 TABLET ORAL at 08:38

## 2017-01-01 RX ADMIN — VALSARTAN 40 MG: 80 TABLET, FILM COATED ORAL at 08:42

## 2017-01-01 RX ADMIN — BRIMONIDINE TARTRATE 1 DROP: 1.5 SOLUTION OPHTHALMIC at 09:05

## 2017-01-01 RX ADMIN — LOSARTAN POTASSIUM 100 MG: 50 TABLET, FILM COATED ORAL at 09:57

## 2017-01-01 RX ADMIN — LIDOCAINE 1 PATCH: 50 PATCH CUTANEOUS at 08:50

## 2017-01-01 RX ADMIN — BUMETANIDE 1 MG: 0.25 INJECTION, SOLUTION INTRAMUSCULAR; INTRAVENOUS at 06:00

## 2017-01-01 RX ADMIN — POTASSIUM CHLORIDE 40 MEQ: 750 CAPSULE, EXTENDED RELEASE ORAL at 12:43

## 2017-01-01 RX ADMIN — GUAIFENESIN AND DEXTROMETHORPHAN HYDROBROMIDE 1 TABLET: 600; 30 TABLET, EXTENDED RELEASE ORAL at 17:38

## 2017-01-01 RX ADMIN — DOXYCYCLINE HYCLATE 100 MG: 100 CAPSULE ORAL at 09:00

## 2017-01-01 RX ADMIN — TAZOBACTAM SODIUM AND PIPERACILLIN SODIUM 3.38 G: 375; 3 INJECTION, SOLUTION INTRAVENOUS at 20:59

## 2017-01-01 RX ADMIN — VALSARTAN 40 MG: 80 TABLET, FILM COATED ORAL at 20:58

## 2017-01-01 RX ADMIN — DOXYCYCLINE HYCLATE 100 MG: 100 CAPSULE ORAL at 09:06

## 2017-01-01 RX ADMIN — ASPIRIN 325 MG ORAL TABLET 325 MG: 325 PILL ORAL at 09:06

## 2017-01-01 RX ADMIN — FAMOTIDINE 20 MG: 20 TABLET ORAL at 17:28

## 2017-01-01 RX ADMIN — POTASSIUM CHLORIDE 20 MEQ: 750 CAPSULE, EXTENDED RELEASE ORAL at 18:05

## 2017-01-01 RX ADMIN — NYSTATIN: 100000 POWDER TOPICAL at 08:33

## 2017-01-01 RX ADMIN — TAZOBACTAM SODIUM AND PIPERACILLIN SODIUM 3.38 G: 375; 3 INJECTION, SOLUTION INTRAVENOUS at 22:50

## 2017-01-01 RX ADMIN — POTASSIUM CHLORIDE 40 MEQ: 750 CAPSULE, EXTENDED RELEASE ORAL at 08:58

## 2017-01-01 RX ADMIN — TAZOBACTAM SODIUM AND PIPERACILLIN SODIUM 3.38 G: 375; 3 INJECTION, SOLUTION INTRAVENOUS at 04:05

## 2017-01-01 RX ADMIN — TAZOBACTAM SODIUM AND PIPERACILLIN SODIUM 3.38 G: 375; 3 INJECTION, SOLUTION INTRAVENOUS at 09:00

## 2017-01-01 RX ADMIN — GABAPENTIN 100 MG: 100 CAPSULE ORAL at 21:00

## 2017-01-01 RX ADMIN — TAZOBACTAM SODIUM AND PIPERACILLIN SODIUM 3.38 G: 375; 3 INJECTION, SOLUTION INTRAVENOUS at 16:38

## 2017-01-01 RX ADMIN — GUAIFENESIN AND DEXTROMETHORPHAN HYDROBROMIDE 1 TABLET: 600; 30 TABLET, EXTENDED RELEASE ORAL at 17:33

## 2017-01-01 RX ADMIN — PANTOPRAZOLE SODIUM 40 MG: 40 TABLET, DELAYED RELEASE ORAL at 05:44

## 2017-01-01 RX ADMIN — FAMOTIDINE 20 MG: 20 TABLET ORAL at 08:38

## 2017-01-01 RX ADMIN — CARVEDILOL 3.12 MG: 3.12 TABLET, FILM COATED ORAL at 17:23

## 2017-01-01 RX ADMIN — CYANOCOBALAMIN TAB 1000 MCG 1000 MCG: 1000 TAB at 08:51

## 2017-01-01 RX ADMIN — TAZOBACTAM SODIUM AND PIPERACILLIN SODIUM 3.38 G: 375; 3 INJECTION, SOLUTION INTRAVENOUS at 16:19

## 2017-01-01 RX ADMIN — FAMOTIDINE 20 MG: 20 TABLET ORAL at 09:22

## 2017-01-01 RX ADMIN — TAZOBACTAM SODIUM AND PIPERACILLIN SODIUM 3.38 G: 375; 3 INJECTION, SOLUTION INTRAVENOUS at 03:34

## 2017-01-01 RX ADMIN — HEPARIN SODIUM 5000 UNITS: 5000 INJECTION, SOLUTION INTRAVENOUS; SUBCUTANEOUS at 21:04

## 2017-01-01 RX ADMIN — CARVEDILOL 3.12 MG: 3.12 TABLET, FILM COATED ORAL at 17:31

## 2017-01-01 RX ADMIN — FLUOXETINE HYDROCHLORIDE 40 MG: 20 CAPSULE ORAL at 09:01

## 2017-01-01 RX ADMIN — FLUTICASONE PROPIONATE 2 SPRAY: 50 SPRAY, METERED NASAL at 09:21

## 2017-01-01 RX ADMIN — FLUOXETINE HYDROCHLORIDE 40 MG: 20 CAPSULE ORAL at 08:55

## 2017-01-01 RX ADMIN — HEPARIN SODIUM 5000 UNITS: 5000 INJECTION, SOLUTION INTRAVENOUS; SUBCUTANEOUS at 21:02

## 2017-01-01 RX ADMIN — DULOXETINE HYDROCHLORIDE 30 MG: 30 CAPSULE, DELAYED RELEASE ORAL at 09:22

## 2017-01-01 RX ADMIN — POTASSIUM CHLORIDE 20 MEQ: 750 CAPSULE, EXTENDED RELEASE ORAL at 12:36

## 2017-01-01 RX ADMIN — Medication 1 DROP: at 21:03

## 2017-01-01 RX ADMIN — PANTOPRAZOLE SODIUM 40 MG: 40 TABLET, DELAYED RELEASE ORAL at 06:31

## 2017-01-01 RX ADMIN — POTASSIUM CHLORIDE 40 MEQ: 750 CAPSULE, EXTENDED RELEASE ORAL at 00:59

## 2017-01-01 RX ADMIN — GABAPENTIN 100 MG: 100 CAPSULE ORAL at 20:55

## 2017-01-01 RX ADMIN — GABAPENTIN 100 MG: 100 CAPSULE ORAL at 21:44

## 2017-01-01 RX ADMIN — FLUTICASONE PROPIONATE 2 SPRAY: 50 SPRAY, METERED NASAL at 08:49

## 2017-01-02 PROBLEM — I50.31 ACUTE DIASTOLIC (CONGESTIVE) HEART FAILURE (HCC): Status: ACTIVE | Noted: 2017-01-01

## 2017-01-02 PROBLEM — J18.9 PNEUMONIA: Status: ACTIVE | Noted: 2017-01-01

## 2017-01-02 PROBLEM — I27.20 PULMONARY HYPERTENSION (HCC): Status: ACTIVE | Noted: 2017-01-01

## 2017-01-02 PROBLEM — J96.00 ACUTE RESPIRATORY FAILURE (HCC): Status: ACTIVE | Noted: 2017-01-01

## 2017-01-02 NOTE — IP AVS SNAPSHOT
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  This is subject to change based on the care your receive at the receiving facility.  You should receive a new list once you are discharged.      If you have any questions or concerns, contact your primary care physician's office.             Your Medications      START taking these medications     guaifenesin-dextromethorphan  MG tablet sustained-release 12 hour tablet   Take 1 tablet by mouth 2 (Two) Times a Day.   Last time this was given:  1/13/2017  8:55 AM   Replaces:  CORICIDIN HBP CONGESTION/COUGH PO           ipratropium-albuterol 0.5-2.5 mg/mL nebulizer   Take 3 mL by nebulization Every 4 (Four) Hours As Needed for shortness of air.   Last time this was given:  1/9/2017  7:04 PM   Commonly known as:  DUO-NEB           lidocaine 5 %   Place 1 patch on the skin Daily. Remove & Discard patch within 12  hours or as directed by MD   Last time this was given:  1/13/2017  8:57 AM   Commonly known as:  LIDODERM           nystatin 573015 UNIT/GM powder   Apply  topically Every 12 (Twelve) Hours.   Last time this was given:  1/13/2017  8:55 AM   Commonly known as:  MYCOSTATIN           valsartan 40 MG tablet   Take 1 tablet by mouth Every 12 (Twelve) Hours.   Last time this was given:  1/13/2017  8:56 AM   Commonly known as:  DIOVAN             CHANGE how you take these medications     torsemide 10 MG tablet   Take 2 tablets by mouth Daily.   Last time this was given:  1/13/2017 10:43 AM   Commonly known as:  DEMADEX   What changed:  how much to take             CONTINUE taking these medications     acetaminophen 325 MG tablet   Take 2 tablets by mouth Every 4 (Four) Hours As Needed for mild pain (1-3).   Last time this was given:  1/12/2017  9:02 PM   Commonly known as:  TYLENOL           aspirin 325 MG tablet   Take 325 mg by mouth Daily.   Last time this was given:  1/13/2017  8:56 AM           brimonidine 0.15 % ophthalmic solution   Administer 1 drop to the right eye 2 (Two) Times a Day.   Last time this was given:  1/13/2017  8:57 AM   Commonly known as:  ALPHAGAN           carvedilol 6.25 MG tablet   Take 6.25 mg by mouth 2 (Two) Times a Day With Meals.   Last time this was given:  1/13/2017  8:56 AM   Commonly known as:  COREG           docusate sodium 100 MG capsule   Take 200 mg by mouth Daily.   Last time this was given:  1/4/2017  9:58 AM   Commonly known as:  COLACE           DULoxetine 30 MG capsule   Take 30 mg by mouth Daily.   Last time this was given:  1/13/2017  8:56 AM   Commonly known as:  CYMBALTA           fexofenadine 180 MG tablet   Take 180 mg by mouth daily.   Commonly known as:  ALLEGRA           FLUoxetine 40 MG capsule   Take 40 mg by mouth Daily.   Last time this was given:  1/13/2017  8:56 AM   Commonly known as:  PROzac           fluticasone 50 MCG/ACT nasal spray   2 sprays into each  nostril Daily.   Last time this was given:  1/13/2017  8:57 AM   Commonly known as:  FLONASE           folic acid 400 MCG tablet   Take 400 mcg by mouth daily.   Commonly known as:  FOLVITE           gabapentin 100 MG capsule   Take 100 mg by mouth Every Night.   Last time this was given:  1/12/2017  9:02 PM   Commonly known as:  NEURONTIN           meclizine 12.5 MG tablet   Take 12.5 mg by mouth 2 (Two) Times a Day As Needed for dizziness.   Commonly known as:  ANTIVERT           omeprazole 20 MG capsule   Take 20 mg by mouth Daily.   Commonly known as:  priLOSEC           ondansetron ODT 4 MG disintegrating tablet   Take 4 mg by mouth 3 (Three) Times a Day As Needed for nausea or vomiting.   Commonly known as:  ZOFRAN-ODT           polyethylene glycol packet   Take 17 g by mouth Daily As Needed.   Last time this was given:  1/4/2017  9:57 AM   Commonly known as:  MIRALAX           potassium chloride 10 MEQ CR tablet   Take 10 mEq by mouth Daily.   Commonly known as:  K-DURKLOR-CON           vitamin B-12 1000 MCG tablet   Take 1,000 mcg by mouth Daily.   Commonly known as:  CYANOCOBALAMIN             STOP taking these medications     CORICIDIN HBP CONGESTION/COUGH PO   Replaced by:  guaifenesin-dextromethorphan  MG tablet sustained-release 12 hour tablet           diclofenac 25 MG EC tablet   Commonly known as:  VOLTAREN           digoxin 125 MCG tablet   Commonly known as:  LANOXIN           LORazepam 0.5 MG tablet   Commonly known as:  ATIVAN           losartan 100 MG tablet   Commonly known as:  COZAAR           melatonin 5 MG tablet tablet           MUCUS RELIEF 400 MG tablet   Generic drug:  guaiFENesin                Where to Get Your Medications      Information about where to get these medications is not yet available     ! Ask your nurse or doctor about these medications     guaifenesin-dextromethorphan  MG tablet sustained-release 12 hour tablet    ipratropium-albuterol 0.5-2.5 mg/mL  nebulizer    lidocaine 5 %    nystatin 573644 UNIT/GM powder    torsemide 10 MG tablet    valsartan 40 MG tablet                Additional Instructions    Information is subject to change based on the care your receive at the receiving facility.  If you have any questions or concerns, contact your primary care physician's office.          Activity Instructions     Activity as Tolerated                 Diet Instructions     Diet: Regular, Cardiac, Soft Texture; Thin Liquids, No Restrictions; Whole       Discharge Diet:   Regular  Cardiac  Soft Texture      Fluid Consistency:  Thin Liquids, No Restrictions   Soft Options:  Whole   Boost with every meal              Follow-ups for After Discharge        Follow-up Information     Follow up with Radha Pal MD .    Specialty:  Internal Medicine    Contact information:    Radha [address] [place] [address]  [phone]        Referrals and Follow-ups to Schedule     Follow-Up    As directed    pcp   Follow Up Details:  1-2 weeks             Scheduled Appointments     Follow up appointment with Dr. Pal 01/23/17 at 3:30pm           Information on Heart Failure     You have been diagnosed with heart failure during your hospitalization. Review to the following to reduce your risk of heart failure:  Discharge Medications  Taking heart failure medications as prescribed is one of the most vital aspects of managing heart failure. It is important to know the names of your medications, how they work, how much to take, and when to take them. You should take your medications at the same time every day. Do not stop your prescribed medications or begin taking over-the-counter or herbal medications without first speaking with your physician.  Activity Level  Exercise will help control your weight, blood pressure, and stress. Controlling these things will help keep your heart healthy. Try to do activities that raise your heart rate. Aim for at least 2½ hours  of moderate exercise a week. One way to do this is to be active at least 10 minutes 3 times a day, 5 days a week. Talk to your doctor before starting an exercise program.  Diet  Lower your cholesterol. If you have high cholesterol, follow your doctor's advice for lowering it. Eating a heart-healthy diet-such as the TLC diet -exercising, and quitting smoking will help keep your cholesterol low.  Weight Monitoring  Through proper diet and moderate exercise, patients should maintain a weight determined by their physician. For heart failure patients, daily weight monitoring is crucial. Your record should be shared with your health care provider at your appointments. Call your doctor if you experience a weight gain of 2-3 pounds or more per day over a 2-day period or 5 pounds in 1 week.  Symptoms  Common heart failure symptoms include:  Chest pain  Fatigue and weakness  Rapid or irregular heartbeat  Shortness of breath when you exert yourself or when you lie down  Reduced ability to exercise  Persistent cough or wheezing with white or pink blood-tinged phlegm  Swelling in your abdomen, legs, ankles and feet  Difficulty concentrating or decreased alertness.    If any of these symptoms suddenly become worse or you develop a new sign or symptom, it may mean that existing heart failure is getting worse or not responding to treatment. Contact your doctor promptly.                   Patient Signature:  ____________________________________________________________    Date:  ____________________________________________________________        More Information      Respiratory failure is when your lungs are not working well and your breathing (respiratory) system fails. When respiratory failure occurs, it is difficult for your lungs to get enough oxygen, get rid of carbon dioxide, or both. Respiratory failure can be life threatening.   Respiratory failure can be acute or chronic. Acute respiratory failure is sudden, severe, and  requires emergency medical treatment. Chronic respiratory failure is less severe, happens over time, and requires ongoing treatment.   WHAT ARE THE CAUSES OF ACUTE RESPIRATORY FAILURE?   Any problem affecting the heart or lungs can cause acute respiratory failure. Some of these causes include the following:  · Chronic bronchitis and emphysema (COPD).    · Blood clot going to a lung (pulmonary embolism).    · Having water in the lungs caused by heart failure, lung injury, or infection (pulmonary edema).    · Collapsed lung (pneumothorax).    · Pneumonia.    · Pulmonary fibrosis.    · Obesity.    · Asthma.    · Heart failure.    · Any type of trauma to the chest that can make breathing difficult.    · Nerve or muscle diseases making chest movements difficult.  HOW WILL MY ACUTE RESPIRATORY FAILURE BE TREATED?   Treatment of acute respiratory failure depends on the cause of the respiratory failure. Usually, you will stay in the intensive care unit so your breathing can be watched closely. Treatment can include the following:  · Oxygen. Oxygen can be delivered through the following:    Nasal cannula. This is small tubing that goes in your nose to give you oxygen.    Face mask. A face mask covers your nose and mouth to give you oxygen.  · Medicine. Different medicines can be given to help with breathing. These can include:    Nebulizers. Nebulizers deliver medicines to open the air passages (bronchodilators). These medicines help to open or relax the airways in the lungs so you can breathe better. They can also help loosen mucus from your lungs.    Diuretics. Diuretic medicines can help you breathe better by getting rid of extra water in your body.    Steroids. Steroid medicines can help decrease swelling (inflammation) in your lungs.    Antibiotics.  · Chest tube. If you have a collapsed lung (pneumothorax), a chest tube is placed to help reinflate the lung.  · Noninvasive positive pressure ventilation (NPPV). This is a  tight-fitting mask that goes over your nose and mouth. The mask has tubing that is attached to a machine. The machine blows air into the tubing, which helps to keep the tiny air sacs (alveoli) in your lungs open. This machine allows you to breathe on your own.  · Ventilator. A ventilator is a breathing machine. When on a ventilator, a breathing tube is put into the lungs. A ventilator is used when you can no longer breathe well enough on your own. You may have low oxygen levels or high carbon dioxide (CO2) levels in your blood. When you are on a ventilator, sedation and pain medicines are given to make you sleep so your lungs can heal.  SEEK IMMEDIATE MEDICAL CARE IF:  · You have shortness of breath (dyspnea) with or without activity.  · You have rapid breathing (tachypnea).  · You are wheezing.  · You are unable to say more than a few words without having to catch your breath.  · You find it very difficult to function normally.  · You have a fast heart rate.  · You have a bluish color to your finger or toe nail beds.  · You have confusion or drowsiness or both.     This information is not intended to replace advice given to you by your health care provider. Make sure you discuss any questions you have with your health care provider.     Document Released: 12/23/2014 Document Revised: 09/07/2016 Document Reviewed: 12/23/2014  [employer] Patient Education ©2016 [employer]

## 2017-01-02 NOTE — ED PROVIDER NOTES
Subjective   HPI Comments: Avril Allen is a 89 y.o.female who presents to the ED c/o SOA, which onset today. Pt's daughter reports pt has had a cough, rhinorrhea and swelling problems in her extremities with onset of her recurrent SOA today. Pt has a pacemaker. She is not normally on home O2.  Her saturations were noted to be 90% on 4 L of oxygen which she was placed on today at her nursing home.    Hx of TIA and CHF.    Dr. Serrano is her cardiologist.        Patient is a 89 y.o. female presenting with shortness of breath.   History provided by:  Patient and relative  Shortness of Breath   Severity:  Mild  Onset quality:  Sudden  Timing:  Constant  Chronicity:  Recurrent  Associated symptoms: cough    Cough:     Chronicity:  New      Review of Systems   HENT: Positive for rhinorrhea.    Respiratory: Positive for cough and shortness of breath.    Cardiovascular: Positive for leg swelling.   All other systems reviewed and are negative.      Past Medical History   Diagnosis Date   • Arthritis    • Atrial fibrillation    • Breast cancer    • Colon cancer    • Diverticulosis    • Environmental allergies    • Glaucoma    • Hypertension    • Irritable bowel    • Skin cancer        No Known Allergies    Past Surgical History   Procedure Laterality Date   • Colectomy partial / total     • Mastectomy Right    • Multiple tooth extractions       dentures   • Pacemaker implantation         History reviewed. No pertinent family history.    Social History     Social History   • Marital status:      Spouse name: N/A   • Number of children: N/A   • Years of education: N/A     Social History Main Topics   • Smoking status: Never Smoker   • Smokeless tobacco: None   • Alcohol use No   • Drug use: No   • Sexual activity: Defer     Other Topics Concern   • None     Social History Narrative    Lives at [facility]         Objective   Physical Exam   Constitutional: She is oriented to person, place, and  time. She appears well-developed and well-nourished.   HENT:   Head: Normocephalic and atraumatic.   Right Ear: External ear normal.   Left Ear: External ear normal.   Nose: Nose normal.   Eyes:   Pale conjunctiva.    Neck: Normal range of motion. Neck supple. JVD present.   Cardiovascular: Normal rate, regular rhythm and normal heart sounds.  Exam reveals no gallop and no friction rub.    No murmur heard.  Pulmonary/Chest: Effort normal. No respiratory distress. She has wheezes (Expiratory.). She has rales. She exhibits no tenderness.   Decreased air movement.   Abdominal: Soft.   Musculoskeletal: Normal range of motion. She exhibits edema (Pitting edema BLE.).   Neurological: She is alert and oriented to person, place, and time.   Skin: Skin is warm and dry.   She has several blood-filled blisters on her legs.   Psychiatric: She has a normal mood and affect. Her behavior is normal. Judgment and thought content normal.   Nursing note and vitals reviewed.      Procedures         ED Course  ED Course   Comment By Time   Chest x-ray shows pulmonary edema, I couldn't rule out infiltrate although clinically this seems to be congestive heart failure.  I've asked the radiologist to read it.  I've spoken with Mrs. Allen's daughter about findings.  She has begun to urinate although is wearing a diaper.  Lung exam there is no more wheezing although she continues to have rales.  Her oxygen saturations are now 98% on 4 L and she appears comfortable. Con Hill MD 01/02 1907   I receive the x-ray report, the radiologist feels this is more likely to be infectious than CHF.  I will obtain blood cultures and give IV antibiotics.  I'm awaiting return call from the hospitalist Con Hill MD 01/02 2027         Course of Care      Lab Results (last 24 hours)     Procedure Component Value Units Date/Time    CBC & Differential [[id number]] Collected:  01/02/17 1658    Specimen:  Blood Updated:  01/02/17 1712    Narrative:        The following orders were created for panel order CBC & Differential.  Procedure                               Abnormality         Status                     ---------                               -----------         ------                     CBC Auto Differential[[id number]]         Abnormal            Final result                 Please view results for these tests on the individual orders.    Comprehensive Metabolic Panel [[id number]]  (Abnormal) Collected:  01/02/17 1658    Specimen:  Blood Updated:  01/02/17 1728     Glucose 102 (H) mg/dL      BUN 30 (H) mg/dL      Creatinine 1.10 mg/dL      Sodium 141 mmol/L      Potassium 4.5 mmol/L      Chloride 102 mmol/L      CO2 29.0 mmol/L      Calcium 9.2 mg/dL      Total Protein 7.4 g/dL      Albumin 3.40 g/dL      ALT (SGPT) 14 U/L      AST (SGOT) 25 U/L      Alkaline Phosphatase 148 (H) U/L      Total Bilirubin 0.8 mg/dL      eGFR Non African Amer 46 (L) mL/min/1.73      Globulin 4.0 gm/dL      A/G Ratio 0.9 (L) g/dL      BUN/Creatinine Ratio 27.3 (H)      Anion Gap 10.0 mmol/L     Narrative:       [employer] Guidelines    Stage                           Description                             GFR                      1                               Normal or High                          90+  2                               Mild decrease                            60-89  3                               Moderate decrease                   30-59  4                               Severe decrease                       15-29  5                               Kidney failure                             <15    Lactic Acid, Plasma [[id number]]  (Normal) Collected:  01/02/17 1658    Specimen:  Blood Updated:  01/02/17 1721     Lactate 1.7 mmol/L       Falsely depressed results may occur on samples drawn from patients receiving N-Acetylcysteine (NAC) or Metamizole.       CBC Auto Differential [[id number]]  (Abnormal) Collected:  01/02/17 1658    Specimen:  Blood  Updated:  01/02/17 1712     WBC 5.73 10*3/mm3      RBC 2.65 (L) 10*6/mm3      Hemoglobin 8.8 (L) g/dL      Hematocrit 27.8 (L) %      .9 (H) fL      MCH 33.2 (H) pg      MCHC 31.7 (L) g/dL      RDW 21.4 (H) %      RDW-SD 81.0 (H) fl      MPV 11.0 fL      Platelets 172 10*3/mm3      Neutrophil % 66.7 %      Lymphocyte % 19.5 (L) %      Monocyte % 8.0 %      Eosinophil % 3.7 (H) %      Basophil % 0.9 %      Immature Grans % 1.2 (H) %      Neutrophils, Absolute 3.82 10*3/mm3      Lymphocytes, Absolute 1.12 10*3/mm3      Monocytes, Absolute 0.46 10*3/mm3      Eosinophils, Absolute 0.21 10*3/mm3      Basophils, Absolute 0.05 10*3/mm3      Immature Grans, Absolute 0.07 (H) 10*3/mm3     BNP [[id number]]  (Abnormal) Collected:  01/02/17 1658    Specimen:  Blood Updated:  01/02/17 1758     BNP 1020.0 (H) pg/mL     POC Troponin, Rapid [[id number]]  (Normal) Collected:  01/02/17 1703    Specimen:  Blood Updated:  01/02/17 1721     Troponin I 0.02 ng/mL       Serial Number: [id number]    : [id number]       Blood Culture [[id number]] Collected:  01/02/17 1735    Specimen:  Blood from Arm, Right Updated:  01/02/17 1800    Blood Culture [[id number]] Collected:  01/02/17 1745    Specimen:  Blood from Arm, Right Updated:  01/02/17 1800          Note: In addition to lab results from this visit, the labs listed above may include labs taken at another facility or during a different encounter within the last 24 hours. Please correlate lab times with ED admission and discharge times for further clarification of the services performed during this visit.    XR Chest 1 View   Final Result   Abnormal   Inflammatory versus atelectatic changes at some levels of each lung.      Bilateral pleural effusions.      Other findings as above.             THIS DOCUMENT HAS BEEN ELECTRONICALLY SIGNED BY MONICA VARGAS MD          Vitals:    01/02/17 2200 01/02/17 2222 01/02/17 2223 01/03/17 0234   BP: 133/99 (!) 153/111 (!) 140/108 130/81   BP  "Location:  Right arm Left arm    Patient Position:  Lying Lying    Pulse: 99 90 95    Resp:  18 18    Temp:  97.8 °F (36.6 °C)     TempSrc:  Oral     SpO2: 93%      Weight:  111 lb 6.4 oz (50.5 kg)     Height:  62\" (157.5 cm)         Medications   sodium chloride 0.9 % flush 10 mL (not administered)   aspirin tablet 325 mg (not administered)   brimonidine (ALPHAGAN) 0.15 % ophthalmic solution 1 drop (not administered)   carvedilol (COREG) tablet 6.25 mg (6.25 mg Oral Given 1/3/17 0039)   digoxin (LANOXIN) tablet 125 mcg (not administered)   docusate sodium (COLACE) capsule 200 mg (not administered)   DULoxetine (CYMBALTA) DR capsule 30 mg (not administered)   cetirizine (zyrTEC) tablet 10 mg (not administered)   FLUoxetine (PROzac) capsule 40 mg (not administered)   fluticasone (FLONASE) 50 MCG/ACT nasal spray 2 spray (not administered)   gabapentin (NEURONTIN) capsule 100 mg (100 mg Oral Given 1/3/17 0039)   losartan (COZAAR) tablet 100 mg (not administered)   LORazepam (ATIVAN) tablet 0.5 mg (not administered)   meclizine (ANTIVERT) tablet 12.5 mg (not administered)   pantoprazole (PROTONIX) EC tablet 40 mg (not administered)   polyethylene glycol (MIRALAX) powder 17 g (not administered)   sodium chloride 0.9 % flush 1-10 mL (not administered)   acetaminophen (TYLENOL) tablet 650 mg (not administered)   ondansetron (ZOFRAN) injection 4 mg (not administered)   bumetanide (BUMEX) injection 1 mg (not administered)   piperacillin-tazobactam (ZOSYN) 3.375 g in dextrose 50 mL IVPB (premix) (not administered)   Pharmacy to Dose LevoFLOXacin (LEVAQUIN) (not administered)   heparin (porcine) 5000 UNIT/ML injection 5,000 Units (not administered)   levoFLOXacin (LEVAQUIN) 750 mg/150 mL D5W (premix) (LEVAQUIN) 750 mg (750 mg Intravenous New Bag 1/3/17 0039)   albuterol (PROVENTIL) nebulizer solution 2.5 mg (2.5 mg Nebulization Given 1/2/17 3808)   furosemide (LASIX) injection 40 mg (40 mg Intravenous Given 1/2/17 1703) "   piperacillin-tazobactam (ZOSYN) 4.5 g in dextrose 100 mL IVPB (premix) (0 g Intravenous Stopped 1/2/17 2140)   vancomycin (VANCOCIN) IVPB 1 g (premix) in Dextrose 5% 200 mL (1,000 mg Intravenous New Bag 1/2/17 2226)       ECG/EMG Results (last 24 hours)     Procedure Component Value Units Date/Time    ECG 12 Lead [[id number]] Collected:  01/02/17 1649     Updated:  01/02/17 1651                      MDM  Number of Diagnoses or Management Options  Acute diastolic (congestive) heart failure: new and requires workup  Acute respiratory failure with hypoxia: new and requires workup  Bronchospasm:   Community acquired pneumonia:      Amount and/or Complexity of Data Reviewed  Clinical lab tests: ordered and reviewed  Tests in the radiology section of CPT®: ordered and reviewed  Obtain history from someone other than the patient: yes  Review and summarize past medical records: yes  Discuss the patient with other providers: yes  Independent visualization of images, tracings, or specimens: yes        Final diagnoses:   Acute diastolic (congestive) heart failure   Bronchospasm   Acute respiratory failure with hypoxia   Community acquired pneumonia       Documentation assistance provided by caroline Perez.  Information recorded by the caroline was done at my direction and has been verified and validated by me.     Saurav Perez  01/02/17 1705       Saurav Perez  01/02/17 1908       Saurav Perez  01/02/17 2040       Con Hill MD  01/03/17 4127

## 2017-01-02 NOTE — IP AVS SNAPSHOT
NAOMY SHEPPARD   Facility: [facility] ([place])   SA: [facility]    MRN:  [id number]   PT#:     Report:  [id number] - Summary of Care Document           Basic Information     Date Of Birth Sex Race Ethnicity Preferred Language Preferred Written Language    9/8/1921 Female White or  Not  or  English English      Allergies as of 1/13/2017  Reviewed On: 1/2/2017 By: Carmela Dooley RN    No Known Allergies      Current Medications Are     acetaminophen (TYLENOL) 325 MG tablet Take 2 tablets by mouth Every 4 (Four) Hours As Needed for mild pain (1-3).    aspirin 325 MG tablet Take 325 mg by mouth Daily.    brimonidine (ALPHAGAN) 0.15 % ophthalmic solution Administer 1 drop to the right eye 2 (Two) Times a Day.    carvedilol (COREG) 6.25 MG tablet Take 6.25 mg by mouth 2 (Two) Times a Day With Meals.    Dextromethorphan-Guaifenesin (CORICIDIN HBP CONGESTION/COUGH PO) Take 1 tablet by mouth Every 6 (Six) Hours As Needed.    diclofenac (VOLTAREN) 25 MG EC tablet Take 25 mg by mouth 2 (Two) Times a Day.    digoxin (LANOXIN) 125 MCG tablet Take 125 mcg by mouth Daily.    docusate sodium (COLACE) 100 MG capsule Take 200 mg by mouth Daily.    DULoxetine (CYMBALTA) 30 MG capsule Take 30 mg by mouth Daily.    fexofenadine (ALLEGRA) 180 MG tablet Take 180 mg by mouth daily.    FLUoxetine (PROzac) 40 MG capsule Take 40 mg by mouth Daily.    fluticasone (FLONASE) 50 MCG/ACT nasal spray 2 sprays into each nostril Daily.    folic acid (FOLVITE) 400 MCG tablet Take 400 mcg by mouth daily.    gabapentin (NEURONTIN) 100 MG capsule Take 100 mg by mouth Every Night.    guaiFENesin (MUCUS RELIEF) 400 MG tablet Take 400 mg by mouth Every 6 (Six) Hours As Needed.    guaifenesin-dextromethorphan (MUCINEX DM)  MG tablet sustained-release 12 hour tablet Take 1 tablet by mouth 2 (Two) Times a Day.    ipratropium-albuterol (DUO-NEB) 0.5-2.5 mg/mL nebulizer Take 3 mL by nebulization Every 4  (Four) Hours As Needed for shortness of air.    lidocaine (LIDODERM) 5 % Place 1 patch on the skin Daily. Remove & Discard patch within 12 hours or as directed by MD    LORazepam (ATIVAN) 0.5 MG tablet Take 1 tablet by mouth 2 (Two) Times a Day As Needed for anxiety.    losartan (COZAAR) 100 MG tablet Take 100 mg by mouth Daily.    meclizine (ANTIVERT) 12.5 MG tablet Take 12.5 mg by mouth 2 (Two) Times a Day As Needed for dizziness.    melatonin 5 MG tablet tablet Take 5 mg by mouth At Night As Needed.    nystatin (MYCOSTATIN) 951600 UNIT/GM powder Apply  topically Every 12 (Twelve) Hours.    omeprazole (priLOSEC) 20 MG capsule Take 20 mg by mouth Daily.    ondansetron ODT (ZOFRAN-ODT) 4 MG disintegrating tablet Take 4 mg by mouth 3 (Three) Times a Day As Needed for nausea or vomiting.    polyethylene glycol (MIRALAX) packet Take 17 g by mouth Daily As Needed.    potassium chloride (K-DUR,KLOR-CON) 10 MEQ CR tablet Take 10 mEq by mouth Daily.    torsemide (DEMADEX) 10 MG tablet Take 2 tablets by mouth Daily.    valsartan (DIOVAN) 40 MG tablet Take 1 tablet by mouth Every 12 (Twelve) Hours.    vitamin B-12 (CYANOCOBALAMIN) 1000 MCG tablet Take 1,000 mcg by mouth Daily.    torsemide (DEMADEX) 10 MG tablet (Discontinued) Take 5 mg by mouth Daily.      Current Immunizations     No immunizations on file.      Problem List as of 1/13/2017  Date Reviewed: 11/24/2016             Codes Priority Class Noted - Resolved    Hypertension (Chronic) ICD-10-CM: I10  ICD-9-CM: 401.9   11/23/2016 - Present    Arthritis (Chronic) ICD-10-CM: M19.90  ICD-9-CM: 716.90   11/23/2016 - Present    Altered mental status ICD-10-CM: R41.82  ICD-9-CM: 780.97   11/23/2016 - Present    RESOLVED: TIA (transient ischemic attack) (Chronic) ICD-10-CM: G45.9  ICD-9-CM: 435.9   11/23/2016 - 11/24/2016    Transient cerebral ischemia ICD-10-CM: G45.9  ICD-9-CM: 435.9   11/23/2016 - Present    Atrial fibrillation, no anticoags due to hx GIB and fall risk  (Chronic) ICD-10-CM: I48.91  ICD-9-CM: 427.31   11/23/2016 - Present    Pacemaker at end of battery life ICD-10-CM: Z45.010  ICD-9-CM: V53.31   11/23/2016 - Present    Pacemaker-dependent due to native cardiac rhythm insufficient to support life ICD-10-CM: I49.8, Z95.0  ICD-9-CM: 427.89, V45.01   11/23/2016 - Present    Failure to thrive in adult ICD-10-CM: R62.7  ICD-9-CM: 783.7   11/24/2016 - Present    Acute blood loss anemia ICD-10-CM: D62  ICD-9-CM: 285.1   11/25/2016 - Present    GI bleed ICD-10-CM: K92.2  ICD-9-CM: 578.9   11/25/2016 - Present    * (Principal)Acute diastolic (congestive) heart failure ICD-10-CM: I50.31  ICD-9-CM: 428.31, 428.0   1/2/2017 - Present    Pulmonary hypertension ICD-10-CM: I27.2  ICD-9-CM: 416.8   1/2/2017 - Present    Acute hypoxic respiratory failure ICD-10-CM: J96.00  ICD-9-CM: 518.81   1/2/2017 - Present    Healthcare Associated Pneumonia ICD-10-CM: J18.9  ICD-9-CM: 486   1/2/2017 - Present    Valvular heart disease ICD-10-CM: I38  ICD-9-CM: 424.90   1/3/2017 - Present    Hypokalemia ICD-10-CM: E87.6  ICD-9-CM: 276.8   1/7/2017 - Present    Chronic anemia ICD-10-CM: D64.9  ICD-9-CM: 285.9   1/7/2017 - Present      Diagnoses        Codes Comments    Acute diastolic (congestive) heart failure    -  Primary ICD-10-CM: I50.31  ICD-9-CM: 428.31, 428.0     Bronchospasm     ICD-10-CM: J98.01  ICD-9-CM: 519.11     Acute respiratory failure with hypoxia     ICD-10-CM: J96.01  ICD-9-CM: 518.81     Community acquired pneumonia     ICD-10-CM: J18.9  ICD-9-CM: 486     Impaired mobility and ADLs     ICD-10-CM: Z74.09  ICD-9-CM: 799.89     Impaired functional mobility, balance, gait, and endurance     ICD-10-CM: Z74.09  ICD-9-CM: V49.89     Chronic atrial fibrillation     ICD-10-CM: I48.2  ICD-9-CM: 427.31     Essential hypertension     ICD-10-CM: I10  ICD-9-CM: 401.9       Lab and Imaging Results     Procedure Component Value Units Date/Time    XR Chest 1 View [[id number]] Collected:  01/13/17  1038     Updated:  01/13/17 1038    Narrative:       EXAMINATION: XR CHEST 1 VW-      INDICATION: I50.31-Acute diastolic (congestive) heart failure;  J98.01-Acute bronchospasm; J96.01-Acute respiratory failure with  hypoxia; J18.9-Pneumonia, unspecified organism; Z74.09-Other reduced  mobility; I48.2-Chronic atrial fibrillation; I10-Essential (primary)  hypertension.      COMPARISON: 01/08/2017 portable chest.     FINDINGS: Right-sided single lead pacemaker is noted. The heart is  markedly enlarged. Diffuse perihilar disease presumably edema, is  improved, now very mild. The upper lungs appear grossly clear. There is  persistent but decrease in right basilar atelectasis and effusion and  stable mild left basilar atelectasis and effusion.           Impression:       1. Resolving pulmonary vascular congestion and decreasing right basilar  atelectasis/effusion.  2. Stable left basilar atelectasis and effusion. No new chest disease is  seen.     D:  01/13/2017  E:  01/13/2017       Basic Metabolic Panel [[id number]]  (Abnormal) Collected:  01/13/17 0549    Specimen:  Blood Updated:  01/13/17 0719     Glucose 84 mg/dL      BUN 26 (H) mg/dL      Creatinine 1.10 mg/dL      Sodium 136 mmol/L      Potassium 4.1 mmol/L      Chloride 91 (L) mmol/L      CO2 42.0 (C) mmol/L      Calcium 10.0 mg/dL      eGFR Non African Amer 46 (L) mL/min/1.73      BUN/Creatinine Ratio 23.6      Anion Gap 3.0 mmol/L     Narrative:       National [employer] Guidelines    Stage                           Description                             GFR                      1                               Normal or High                          90+  2                               Mild decrease                            60-89  3                               Moderate decrease                   30-59  4                               Severe decrease                       15-29  5                               Kidney failure                              <15    CBC (No Diff) [[id number]]  (Abnormal) Collected:  01/12/17 1207    Specimen:  Blood Updated:  01/12/17 1335     WBC 9.28 10*3/mm3      RBC 2.78 (L) 10*6/mm3      Hemoglobin 9.4 (L) g/dL      Hematocrit 29.4 (L) %      .8 (H) fL      MCH 33.8 (H) pg      MCHC 32.0 g/dL      RDW 19.0 (H) %      RDW-SD 73.8 (H) fl      MPV 10.8 fL      Platelets 233 10*3/mm3     Basic Metabolic Panel [[id number]]  (Abnormal) Collected:  01/12/17 1207    Specimen:  Blood Updated:  01/12/17 1323     Glucose 108 (H) mg/dL      BUN 29 (H) mg/dL      Creatinine 1.10 mg/dL      Sodium 141 mmol/L      Potassium 4.2 mmol/L      Chloride 93 (L) mmol/L      CO2 42.0 (C) mmol/L      Calcium 10.3 mg/dL      eGFR Non African Amer 46 (L) mL/min/1.73      BUN/Creatinine Ratio 26.4 (H)      Anion Gap 6.0 mmol/L     Narrative:       National Kid[employer] Guidelines    Stage                           Description                             GFR                      1                               Normal or High                          90+  2                               Mild decrease                            60-89  3                               Moderate decrease                   30-59  4                               Severe decrease                       15-29  5                               Kidney failure                             <15    Basic Metabolic Panel [[id number]]  (Abnormal) Collected:  01/11/17 0511    Specimen:  Blood Updated:  01/11/17 0641     Glucose 80 mg/dL      BUN 25 (H) mg/dL      Creatinine 1.10 mg/dL      Sodium 140 mmol/L      Potassium 3.8 mmol/L      Chloride 95 (L) mmol/L      CO2 39.0 (H) mmol/L      Calcium 9.7 mg/dL      eGFR Non African Amer 46 (L) mL/min/1.73      BUN/Creatinine Ratio 22.7      Anion Gap 6.0 mmol/L     Narrative:       National Kid[employer] Guidelines    Stage                           Description                             GFR                      1                                Normal or High                          90+  2                               Mild decrease                            60-89  3                               Moderate decrease                   30-59  4                               Severe decrease                       15-29  5                               Kidney failure                             <15    Potassium [[id number]]  (Normal) Collected:  01/10/17 2209    Specimen:  Blood Updated:  01/10/17 2247     Potassium 4.4 mmol/L     Basic Metabolic Panel [[id number]]  (Abnormal) Collected:  01/10/17 0613    Specimen:  Blood Updated:  01/10/17 0750     Glucose 84 mg/dL      BUN 22 mg/dL      Creatinine 0.90 mg/dL      Sodium 138 mmol/L      Potassium 2.6 (C) mmol/L      Chloride 91 (L) mmol/L      CO2 38.0 (H) mmol/L      Calcium 9.5 mg/dL      eGFR Non African Amer 58 (L) mL/min/1.73      BUN/Creatinine Ratio 24.4      Anion Gap 9.0 mmol/L     Narrative:       National Kid[employer] Guidelines    Stage                           Description                             GFR                      1                               Normal or High                          90+  2                               Mild decrease                            60-89  3                               Moderate decrease                   30-59  4                               Severe decrease                       15-29  5                               Kidney failure                             <15    XR Chest 1 View [[id number]] Collected:  01/08/17 1510     Updated:  01/09/17 0913    Narrative:          EXAMINATION: XR CHEST, SINGLE VIEW - 01/08/2017     INDICATION:  I50.31-Acute diastolic (congestive) heart failure;  J98.01-Acute bronchospasm; J96.01-Acute respiratory failure with  hypoxia; J18.9-Pneumonia, unspecified organism; Z74.09-Other reduced  mobility; I48.2-Chronic atrial fibrillation; I10-Essential (primary)  hypertension.      COMPARISON: 01/05/2017     FINDINGS: Portable  chest reveals heart to be enlarged. Increased  markings are seen within the upper and mid lung fields. Small bilateral  pleural effusions. Degenerative change is seen within the spine.  Vascular calcifications are present. Pacer lead is unchanged.           Impression:       Stable chest as above.     DICTATED:     01/08/2017  EDITED:          01/08/2017     This report was finalized on 1/9/2017 9:11 AM by Dr. Haley Mariee MD.       Basic Metabolic Panel [[id number]]  (Abnormal) Collected:  01/09/17 0429    Specimen:  Blood Updated:  01/09/17 0531     Glucose 93 mg/dL      BUN 21 mg/dL      Creatinine 1.00 mg/dL      Sodium 139 mmol/L      Potassium 3.8 mmol/L      Chloride 97 (L) mmol/L      CO2 33.0 (H) mmol/L      Calcium 9.5 mg/dL      eGFR Non African Amer 52 (L) mL/min/1.73      BUN/Creatinine Ratio 21.0      Anion Gap 9.0 mmol/L     Narrative:       National Kid[employer] Guidelines    Stage                           Description                             GFR                      1                               Normal or High                          90+  2                               Mild decrease                            60-89  3                               Moderate decrease                   30-59  4                               Severe decrease                       15-29  5                               Kidney failure                             <15    Basic Metabolic Panel [[id number]]  (Abnormal) Collected:  01/08/17 0736    Specimen:  Blood Updated:  01/08/17 0903     Glucose 88 mg/dL      BUN 21 mg/dL      Creatinine 1.00 mg/dL      Sodium 141 mmol/L      Potassium 4.7 mmol/L      Chloride 100 mmol/L      CO2 34.0 (H) mmol/L      Calcium 9.8 mg/dL      eGFR Non African Amer 52 (L) mL/min/1.73      BUN/Creatinine Ratio 21.0      Anion Gap 7.0 mmol/L     Narrative:       National Kid[employer] Guidelines    Stage                           Description                             GFR                       1                               Normal or High                          90+  2                               Mild decrease                            60-89  3                               Moderate decrease                   30-59  4                               Severe decrease                       15-29  5                               Kidney failure                             <15    CBC (No Diff) [[id number]]  (Abnormal) Collected:  01/08/17 0736    Specimen:  Blood Updated:  01/08/17 0829     WBC 11.40 (H) 10*3/mm3      RBC 2.67 (L) 10*6/mm3      Hemoglobin 9.3 (L) g/dL      Hematocrit 29.0 (L) %      .6 (H) fL      MCH 34.8 (H) pg      MCHC 32.1 g/dL      RDW 20.8 (H) %      RDW-SD 81.7 (H) fl      MPV 11.6 fL      Platelets 197 10*3/mm3     Blood Culture [[id number]]  (Normal) Collected:  01/02/17 1745    Specimen:  Blood from Arm, Right Updated:  01/07/17 1801     Blood Culture No growth at 5 days     Blood Culture [[id number]]  (Normal) Collected:  01/02/17 1735    Specimen:  Blood from Arm, Right Updated:  01/07/17 1801     Blood Culture No growth at 5 days     Potassium [[id number]]  (Abnormal) Collected:  01/07/17 1602    Specimen:  Blood Updated:  01/07/17 1633     Potassium 2.9 (L) mmol/L     Blood Gas, Arterial [[id number]]  (Abnormal) Resulted:  01/07/17 1526    Specimen:  Arterial Blood Updated:  01/07/17 1526     Site Arterial: right brachial      Barry's Test N/A      pH, Arterial 7.429 pH units      pCO2, Arterial 49.9 (H) mm Hg      pO2, Arterial 87.2 mm Hg      HCO3, Arterial 33.0 (H) mmol/L      Base Excess, Arterial 8.0 (H) mmol/L      Hemoglobin, Blood Gas 8.2 (L) g/dL      Hematocrit, Blood Gas 25.2 %      Oxyhemoglobin 93.5 (L) %      Methemoglobin 0.8 %      Carboxyhemoglobin 0.9 %      CO2 Content 34.5 (H)      Barometric Pressure for Blood Gas -- mmHg       N/A        Modality Cannula      FIO2 32 %     Basic Metabolic Panel [[id number]]  (Abnormal) Collected:  01/06/17  0618    Specimen:  Blood Updated:  01/06/17 0730     Glucose 104 (H) mg/dL      BUN 19 mg/dL      Creatinine 0.90 mg/dL      Sodium 141 mmol/L      Potassium 3.2 (L) mmol/L      Chloride 101 mmol/L      CO2 31.0 mmol/L      Calcium 9.5 mg/dL      eGFR Non African Amer 58 (L) mL/min/1.73      BUN/Creatinine Ratio 21.1      Anion Gap 9.0 mmol/L     Narrative:       National Kidney Foundation Guidelines    Stage                           Description                             GFR                      1                               Normal or High                          90+  2                               Mild decrease                            60-89  3                               Moderate decrease                   30-59  4                               Severe decrease                       15-29  5                               Kidney failure                             <15    CBC (No Diff) [[id number]]  (Abnormal) Collected:  01/06/17 0618    Specimen:  Blood Updated:  01/06/17 0716     WBC 10.05 10*3/mm3      RBC 2.50 (L) 10*6/mm3      Hemoglobin 8.7 (L) g/dL      Hematocrit 26.6 (L) %      .4 (H) fL      MCH 34.8 (H) pg      MCHC 32.7 g/dL      RDW 21.9 (H) %      RDW-SD 84.9 (H) fl      MPV 11.2 fL      Platelets 187 10*3/mm3     CBC (No Diff) [[id number]]  (Abnormal) Collected:  01/05/17 1133    Specimen:  Blood Updated:  01/05/17 1230     WBC 9.38 10*3/mm3      RBC 2.46 (L) 10*6/mm3      Hemoglobin 8.3 (L) g/dL      Hematocrit 26.1 (L) %      .1 (H) fL      MCH 33.7 (H) pg      MCHC 31.8 (L) g/dL      RDW 21.7 (H) %      RDW-SD 83.5 (H) fl      MPV 10.9 fL      Platelets 190 10*3/mm3     Clostridium Difficile Toxin, PCR [[id number]]  (Normal) Collected:  01/05/17 0249    Specimen:  Stool from Per Rectum Updated:  01/05/17 0934     C. Difficile Toxins by PCR Not Detected     Narrative:         Performance characteristics of test not established for patients <2 years of age.  Negative for Toxigenic  C. Difficile    Basic Metabolic Panel [[id number]]  (Abnormal) Collected:  01/05/17 0622    Specimen:  Blood Updated:  01/05/17 0747     Glucose 100 mg/dL      BUN 16 mg/dL      Creatinine 1.00 mg/dL      Sodium 138 mmol/L      Potassium 3.7 mmol/L      Chloride 100 mmol/L      CO2 31.0 mmol/L      Calcium 9.2 mg/dL      eGFR Non African Amer 52 (L) mL/min/1.73      BUN/Creatinine Ratio 16.0      Anion Gap 7.0 mmol/L     Narrative:       National Kidney Foundation Guidelines    Stage                           Description                             GFR                      1                               Normal or High                          90+  2                               Mild decrease                            60-89  3                               Moderate decrease                   30-59  4                               Severe decrease                       15-29  5                               Kidney failure                             <15    Iron Profile [[id number]]  (Abnormal) Collected:  01/05/17 0622    Specimen:  Blood Updated:  01/05/17 0747     Iron 31 (L) mcg/dL      TIBC 64 (L) mcg/dL      Iron Saturation 48 %     Ferritin [[id number]]  (Normal) Collected:  01/05/17 0622    Specimen:  Blood Updated:  01/05/17 0747     Ferritin 248.00 ng/mL     Folate [[id number]]  (Abnormal) Collected:  01/05/17 0622    Specimen:  Blood Updated:  01/05/17 0747     Folate >24.00 (H) ng/mL     Narrative:         Folate Reference Ranges:    Deficient:            Less than 1.2 ng/mL  Indeterminant:        1.2-3.1 ng/mL  Normal:               3.2-20.0 ng/mL    Reticulocytes [[id number]]  (Abnormal) Collected:  01/05/17 0622    Specimen:  Blood Updated:  01/05/17 0700     Reticulocyte % 5.20 (H) %     XR Chest 1 View [[id number]]  (Abnormal) Collected:  01/05/17 0000     Updated:  01/05/17 0347    Narrative:       EXAM:  XR Chest, 1 View.  CLINICAL HISTORY:  89 years old, female; Signs and symptoms; Cough; Symptoms   not  specified; Prior surgery; Surgery date: 6+ months; Surgery type: Pacemaker;   Patient HX: Bilateral infiltrates, nonproductive cough, pneumonia, acute   respiratory failure with hypoxia, acute bronchospasm, chf;  TECHNIQUE:  Frontal view of the chest.  EXAM DATE/TIME:  1/5/2017 12:00 AM  COMPARISON:  CR - XR CHEST 1 VW 1/2/2017 6:49:13 PM    FINDINGS:    Lungs:  Bilateral interstitial and alveolar opacities with interval   improvement.    Pleural spaces:  Improved bilateral costophrenic angle blunting.  No   pneumothorax.    Heart:  Heart size is enlarged but stable in configuration.    Mediastinum:  Atherosclerotic tortuosity of the thoracic aorta.    Bones/joints:  Bony demineralization, thoracic kyphoscoliosis, spondylosis   and degenerative bony changes.    Tubes and lines:  Right unipolar pacemaker in place.  Monitor leads project   over the chest.      Impression:       1.  Pulmonary edema versus pneumonia and bilateral pleural effusions with   interval improvement.  2.  Stable cardiomegaly.  3.  Atherosclerotic vascular disease.      THIS DOCUMENT HAS BEEN ELECTRONICALLY SIGNED BY JOHNNIE VICTOR JR. MD    Vitamin B12 [[id number]]  (Normal) Collected:  01/04/17 0513    Specimen:  Blood Updated:  01/04/17 0807     Vitamin B-12 779 pg/mL     Basic Metabolic Panel [[id number]]  (Abnormal) Collected:  01/04/17 0513    Specimen:  Blood Updated:  01/04/17 0710     Glucose 89 mg/dL      BUN 31 (H) mg/dL      Creatinine 1.20 mg/dL      Sodium 138 mmol/L      Potassium 3.2 (L) mmol/L      Chloride 99 mmol/L      CO2 30.0 mmol/L      Calcium 8.8 mg/dL      eGFR Non African Amer 42 (L) mL/min/1.73      BUN/Creatinine Ratio 25.8 (H)      Anion Gap 9.0 mmol/L     Narrative:       National Kidney Foundation Guidelines    Stage                           Description                             GFR                      1                               Normal or High                          90+  2                               Mild  decrease                            60-89  3                               Moderate decrease                   30-59  4                               Severe decrease                       15-29  5                               Kidney failure                             <15    Magnesium [[id number]]  (Normal) Collected:  01/03/17 0447    Specimen:  Blood Updated:  01/03/17 1030     Magnesium 2.1 mg/dL     Basic Metabolic Panel [[id number]]  (Abnormal) Collected:  01/03/17 0447    Specimen:  Blood Updated:  01/03/17 0622     Glucose 97 mg/dL      BUN 29 (H) mg/dL      Creatinine 1.10 mg/dL      Sodium 142 mmol/L      Potassium 3.6 mmol/L      Chloride 103 mmol/L      CO2 27.0 mmol/L      Calcium 9.1 mg/dL      eGFR Non African Amer 46 (L) mL/min/1.73      BUN/Creatinine Ratio 26.4 (H)      Anion Gap 12.0 (H) mmol/L     Narrative:       National Kidney Foundation Guidelines    Stage                           Description                             GFR                      1                               Normal or High                          90+  2                               Mild decrease                            60-89  3                               Moderate decrease                   30-59  4                               Severe decrease                       15-29  5                               Kidney failure                             <15    XR Chest 1 View [[id number]]  (Abnormal) Collected:  01/02/17 1813     Updated:  01/02/17 2230    Narrative:       EXAM:  XR Chest, 1 View.    CLINICAL HISTORY:  89 years old, female; Signs and symptoms; Shortness of breath; Additional info:   SOB    TECHNIQUE:  Frontal view of the chest.    EXAM DATE/TIME:  1/2/2017 6:13 PM    COMPARISON:  CR - XR CHEST 1 VW 11/23/2016 10:36:04 AM    FINDINGS:  No cardiac or pulmonary vascular enlargement. Grossly stable pacemaker   placement.    The cardiac borders are not entirely seen but there otherwise again appears to   be  cardiomegaly.    Pneumonitis and perhaps other inflammatory change versus atelectatic changes at   some levels of each lung. Somewhat increased but indistinct density in the   right hilar/perihilar region is of uncertain significance.    There appears to be a pleural effusion bilaterally.    No pneumothorax bilaterally.    Grossly intact visualized skeletal structures.        Impression:       Inflammatory versus atelectatic changes at some levels of each lung.    Bilateral pleural effusions.    Other findings as above.         THIS DOCUMENT HAS BEEN ELECTRONICALLY SIGNED BY MONICA VARGAS MD    [place] Draw [[id number]] Collected:  01/02/17 1658    Specimen:  Blood Updated:  01/02/17 2102    Narrative:       The following orders were created for panel order [place] Draw.  Procedure                               Abnormality         Status                     ---------                               -----------         ------                     Light Blue Top[[id number]]                                    Final result               Green Top (Gel)[[id number]]                                   Final result               Lavender Top[[id number]]                                      Final result               Gold Top - SST[[id number]]                                    Final result               Green Top (No Gel)[[id number]]                                                             Please view results for these tests on the individual orders.    Light Blue Top [[id number]] Collected:  01/02/17 1659    Specimen:  Blood Updated:  01/02/17 2102     Extra Tube hold for add-on       Auto resulted       Green Top (Gel) [[id number]] Collected:  01/02/17 1658    Specimen:  Blood Updated:  01/02/17 2102     Extra Tube Hold for add-ons.       Auto resulted.       Gold Top - SST [[id number]] Collected:  01/02/17 1658    Specimen:  Blood Updated:  01/02/17 2102     Extra Tube Hold for add-ons.       Auto resulted.       Lavender Top [[id number]]  Collected:  01/02/17 1658    Specimen:  Blood Updated:  01/02/17 2101     Extra Tube hold for add-on       Auto resulted       Type & Screen [[id number]] Collected:  01/02/17 1739    Specimen:  Blood Updated:  01/02/17 1834     ABO Type O      RH type Positive      Antibody Screen Negative     BNP [[id number]]  (Abnormal) Collected:  01/02/17 1658    Specimen:  Blood Updated:  01/02/17 1758     BNP 1020.0 (H) pg/mL     Comprehensive Metabolic Panel [[id number]]  (Abnormal) Collected:  01/02/17 1658    Specimen:  Blood Updated:  01/02/17 1728     Glucose 102 (H) mg/dL      BUN 30 (H) mg/dL      Creatinine 1.10 mg/dL      Sodium 141 mmol/L      Potassium 4.5 mmol/L      Chloride 102 mmol/L      CO2 29.0 mmol/L      Calcium 9.2 mg/dL      Total Protein 7.4 g/dL      Albumin 3.40 g/dL      ALT (SGPT) 14 U/L      AST (SGOT) 25 U/L      Alkaline Phosphatase 148 (H) U/L      Total Bilirubin 0.8 mg/dL      eGFR Non African Amer 46 (L) mL/min/1.73      Globulin 4.0 gm/dL      A/G Ratio 0.9 (L) g/dL      BUN/Creatinine Ratio 27.3 (H)      Anion Gap 10.0 mmol/L     Narrative:       [employer] Guidelines    Stage                           Description                             GFR                      1                               Normal or High                          90+  2                               Mild decrease                            60-89  3                               Moderate decrease                   30-59  4                               Severe decrease                       15-29  5                               Kidney failure                             <15    Lactic Acid, Plasma [[id number]]  (Normal) Collected:  01/02/17 1658    Specimen:  Blood Updated:  01/02/17 1721     Lactate 1.7 mmol/L       Falsely depressed results may occur on samples drawn from patients receiving N-Acetylcysteine (NAC) or Metamizole.       POC Troponin, Rapid [[id number]]  (Normal) Collected:  01/02/17 1707     "Specimen:  Blood Updated:  01/02/17 1721     Troponin I 0.02 ng/mL       Serial Number: [id number]    : [id number]       CBC & Differential [[id number]] Collected:  01/02/17 1658    Specimen:  Blood Updated:  01/02/17 1712    Narrative:       The following orders were created for panel order CBC & Differential.  Procedure                               Abnormality         Status                     ---------                               -----------         ------                     CBC Auto Differential[[id number]]         Abnormal            Final result                 Please view results for these tests on the individual orders.    CBC Auto Differential [[id number]]  (Abnormal) Collected:  01/02/17 1658    Specimen:  Blood Updated:  01/02/17 1712     WBC 5.73 10*3/mm3      RBC 2.65 (L) 10*6/mm3      Hemoglobin 8.8 (L) g/dL      Hematocrit 27.8 (L) %      .9 (H) fL      MCH 33.2 (H) pg      MCHC 31.7 (L) g/dL      RDW 21.4 (H) %      RDW-SD 81.0 (H) fl      MPV 11.0 fL      Platelets 172 10*3/mm3      Neutrophil % 66.7 %      Lymphocyte % 19.5 (L) %      Monocyte % 8.0 %      Eosinophil % 3.7 (H) %      Basophil % 0.9 %      Immature Grans % 1.2 (H) %      Neutrophils, Absolute 3.82 10*3/mm3      Lymphocytes, Absolute 1.12 10*3/mm3      Monocytes, Absolute 0.46 10*3/mm3      Eosinophils, Absolute 0.21 10*3/mm3      Basophils, Absolute 0.05 10*3/mm3      Immature Grans, Absolute 0.07 (H) 10*3/mm3       Vitals     BP Pulse Temp Resp Ht Wt    115/88 (BP Location: Right arm, Patient Position: Lying) 90 98 °F (36.7 °C) (Oral) 18 62\" (157.5 cm) 99 lb 3.2 oz (45 kg)    SpO2 BMI             92% 18.14 kg/m2       Vitals History      Social History     Category History    Smoking Tobacco Use Never Smoker    Smokeless Tobacco Use Unknown    Tobacco Comment     Alcohol Use No    Drug Use No    Sexual Activity Defer    ADL Not Asked      Patient Care Team        Relationship Specialty Notifications Start End    Radha C " MD Demar[facility] PCP - General Internal Medicine  1/2/17        Instructions for After Discharge        Follow-up Information     Follow up with Radha Pal MD .    Specialty:  Internal Medicine    Contact information:    [place] [id number] [phone]        Activity Instructions     Activity as Tolerated                 Diet Instructions     Diet: Regular, Cardiac, Soft Texture; Thin Liquids, No Restrictions; Whole       Discharge Diet:   Regular  Cardiac  Soft Texture      Fluid Consistency:  Thin Liquids, No Restrictions   Soft Options:  Whole   Boost with every meal             Referrals and Follow-ups to Schedule     Follow-Up    As directed    pcp   Follow Up Details:  1-2 weeks

## 2017-01-03 PROBLEM — I38 VALVULAR HEART DISEASE: Status: ACTIVE | Noted: 2017-01-01

## 2017-01-03 NOTE — H&P
"    [facility] Medicine Services  HISTORY AND PHYSICAL    Primary Care Physician: Radha Pal MD    Subjective     Chief Complaint:    Dyspnea, LE edema    History of Present Illness: 89-year-old patient who was recently hospitalized at [facility] in late November and discharged on 12/1/2016 to SNF rehabilitation.  Patient has known atrial fibrillation not on anticoagulation due to history of GI bleeding as well as advanced age and fall risk, she also has known severe valvular heart disease and diastolic heart failure with pulmonary hypertension.  Patient has a permanent pacemaker which is near the end of its battery life however Dr. Cramer has elected to not do battery change out due to patient's asymptomatic nature and given her advanced age unless symptoms were present risk outweighed benefit.    She is brought to the ED today after 2-3 days of escalating dyspnea which culminated today in severe dyspnea this morning upon waking requiring supplemental nasal cannula oxygen to be placed on the patient although she does not normally wear oxygen at baseline.  Patient has had increasing sinus congestion, wet cough with production of thick sputum.  She also complains of approximately 2-3 days of general malaise and overall \"ill felling\". Patient also had an increase in lower extremity edema over past week, she states this is worse as the day goes on due to dependent edema while using her walker to ambulate etc.  In the remote past she was on diuretics however those had been stopped and upon discharge to the skilled rehabilitation from [facility] and there was no diuretics listed on her d/c med reconciliation, however since being at SNF rehabilitation someone has added back low-dose torsemide presumably for patient's dyspneic complaints of lower extremity edema.  It is unclear when the torsemide was restarted, patient is not aware and her daughter who is her " POA and a retired nurse is currently not present at time of my evaluation.        Review of Systems   Constitutional: Positive for chills and fatigue.   HENT: Positive for congestion.    Respiratory: Positive for cough, shortness of breath and wheezing.    Cardiovascular: Positive for leg swelling. Negative for chest pain.   Gastrointestinal: Negative for abdominal pain.   All other systems reviewed and are negative.     Otherwise complete ROS performed and negative except as mentioned in the HPI.    Past Medical History:   Past Medical History   Diagnosis Date   • Arthritis    • Atrial fibrillation    • Breast cancer    • Colon cancer    • Diverticulosis    • Environmental allergies    • Glaucoma    • Hypertension    • Irritable bowel    • Skin cancer        Past Surgical History:  Past Surgical History   Procedure Laterality Date   • Colectomy partial / total     • Mastectomy Right    • Multiple tooth extractions       dentures   • Pacemaker implantation         Family History: family history is not on file.    Social History:  reports that she has never smoked. She does not have any smokeless tobacco history on file. She reports that she does not drink alcohol or use illicit drugs.    Medications:  Prescriptions Prior to Admission   Medication Sig Dispense Refill Last Dose   • carvedilol (COREG) 6.25 MG tablet Take 6.25 mg by mouth 2 (Two) Times a Day With Meals.      • Dextromethorphan-Guaifenesin (CORICIDIN HBP CONGESTION/COUGH PO) Take 1 tablet by mouth Daily.      • digoxin (LANOXIN) 125 MCG tablet Take 125 mcg by mouth Daily.      • docusate sodium (COLACE) 100 MG capsule Take 200 mg by mouth Daily.      • DULoxetine (CYMBALTA) 30 MG capsule Take 30 mg by mouth Daily.      • guaiFENesin (MUCUS RELIEF) 400 MG tablet Take 400 mg by mouth 4 (Four) Times a Day.      • losartan (COZAAR) 100 MG tablet Take 100 mg by mouth Daily.      • potassium chloride (K-DUR,KLOR-CON) 10 MEQ CR tablet Take 10 mEq by mouth  "Daily.      • torsemide (DEMADEX) 10 MG tablet Take 5 mg by mouth Daily.      • vitamin B-12 (CYANOCOBALAMIN) 1000 MCG tablet Take 1,000 mcg by mouth Daily.      • acetaminophen (TYLENOL) 325 MG tablet Take 2 tablets by mouth Every 4 (Four) Hours As Needed for mild pain (1-3).  0    • aspirin 325 MG tablet Take 325 mg by mouth Daily.      • brimonidine (ALPHAGAN) 0.15 % ophthalmic solution Administer 1 drop to the right eye 2 (Two) Times a Day.      • diclofenac (VOLTAREN) 25 MG EC tablet Take 25 mg by mouth 2 (Two) Times a Day.      • fexofenadine (ALLEGRA) 180 MG tablet Take 180 mg by mouth daily.      • FLUoxetine (PROzac) 40 MG capsule Take 40 mg by mouth Daily.      • fluticasone (FLONASE) 50 MCG/ACT nasal spray 2 sprays into each nostril Daily.      • folic acid (FOLVITE) 400 MCG tablet Take 400 mcg by mouth daily.      • gabapentin (NEURONTIN) 100 MG capsule Take 100 mg by mouth Every Night.      • LORazepam (ATIVAN) 0.5 MG tablet Take 1 tablet by mouth 2 (Two) Times a Day As Needed for anxiety. 10 tablet 0    • meclizine (ANTIVERT) 12.5 MG tablet Take 12.5 mg by mouth 2 (Two) Times a Day As Needed for dizziness.      • melatonin 5 MG tablet tablet Take 5 mg by mouth At Night As Needed.      • omeprazole (priLOSEC) 20 MG capsule Take 20 mg by mouth Daily.      • ondansetron ODT (ZOFRAN-ODT) 4 MG disintegrating tablet Take 4 mg by mouth 3 (Three) Times a Day As Needed for nausea or vomiting.      • polyethylene glycol (MIRALAX) packet Take 17 g by mouth Daily As Needed.          Allergies:  No Known Allergies      Objective     Physical Exam:  Vital Signs:   Visit Vitals   • BP (!) 140/108 (BP Location: Left arm, Patient Position: Lying)   • Pulse 95   • Temp 97.8 °F (36.6 °C) (Oral)   • Resp 18   • Ht 62\" (157.5 cm)   • Wt 111 lb 6.4 oz (50.5 kg)   • SpO2 93%   • BMI 20.38 kg/m2     Physical Exam  Temp:  [97.5 °F (36.4 °C)-97.8 °F (36.6 °C)] 97.8 °F (36.6 °C)  Heart Rate:  [] 95  Resp:  [18-36] 18  BP: " (133-170)/() 140/108  Constitutional: no acute distress, awake, alert  Eyes: PERRLA, sclerae anicteric, no conjunctival injection  Neck: supple, no thyromegaly, trachea midline  Respiratory: decreased breath sounds at bases bilaterally, mildly dyspneic with conversation, no crackles or rales, poor effort  Cardiovascular: IRRR, IV/VI systolic and diastolic blowing murmurs  Gastrointestinal: Positive bowel sounds, soft, nontender, nondistended  Musculoskeletal: 1+ pitting ankle edema, no clubbing or cyanosis to bilateral lower extremities  Psychiatric: oriented x 3, appropriate affect, cooperative  Neurologic: Strength symmetric in all extremities, Cranial Nerves grossly intact to confrontation         Results Reviewed:    Results from last 7 days  Lab Units 01/02/17  1658   WBC 10*3/mm3 5.73   HEMOGLOBIN g/dL 8.8*   PLATELETS 10*3/mm3 172       Results from last 7 days  Lab Units 01/02/17  1658   SODIUM mmol/L 141   POTASSIUM mmol/L 4.5   TOTAL CO2 mmol/L 29.0   CREATININE mg/dL 1.10   GLUCOSE mg/dL 102*   CALCIUM mg/dL 9.2       I have personally reviewed and interpreted available lab data, radiology studies and ECG obtained at time of admission.     Assessment / Plan     Assessment/Problem List:   Principal Problem:    Acute diastolic (congestive) heart failure  Active Problems:    Hypertension    Atrial fibrillation, no anticoags due to hx GIB and fall risk    Pacemaker at end of battery life    Pulmonary hypertension    Acute hypoxic respiratory failure    Healthcare Associated Pneumonia    Valvular heart disease          Plan:    - s/p IV Lasix 40mg in ED, will continue 1mg Bumex IV in am then can reassess for transition back to PO diuretic.  - Zosyn/Levaquin for presumed HCAP (has been in SNF rehab since d/c from [facility] 12/1/16) given thick sputum production, ill-feeling, and wet cough with obscured bilateral lower lobes on CXR  - does not normally wear O2 but currently requires 2L to sat 91% (was 91% on 4L  in ED initially)  - NOTE: history unclear but appears when pt was d/c'd from [facility] 12/1/16 she was OFF all diuretics but she has been restarted on Torsemide 5mg qday while at SNF presumed due to edema and SOA complaints but it is unclear when this occurred. Given DHF, severe VHD and pulm HTN likely needs to remain on a daily diuretic for volume control if can tolerate.   - Pt's daughter is POA and retired nurse but was gone by time of my eval so she may be able to give better hisotry of recent diuretic reinitiation.  Also will need to clarify/verify CODE STATUS with her.   - CM/PT/OT to see while here, will need to return to SNF rehab.          DVT prophylaxis: Lovenox  Code Status:  Need to clarify with POA (daughter)  Admission Status: Patient will be admitted to INPATIENT status due to the need for care which can only be reasonably provided in an hospital setting such as aggressive/expedited ancillary services and/or consultation services and the necessity for IV medications, close physician monitoring and/or the possible need for procedures.  In such, I feel patient’s risk for adverse outcomes and need for care warrant INPATIENT evaluation and predict the patient’s care encounter to likely last beyond 2 midnights.       Jackelin Medley MD 01/03/17 12:54 AM

## 2017-01-03 NOTE — PROGRESS NOTES
Acute Care - Occupational Therapy Initial Evaluation  [facility]     Patient Name: Avril Allen  : 1921  MRN: [id number]  Today's Date: 1/3/2017  Onset of Illness/Injury or Date of Surgery Date: 17  Date of Referral to OT: 17  Referring Physician: Dr. Medley    Admit Date: 2017       ICD-10-CM ICD-9-CM   1. Acute diastolic (congestive) heart failure I50.31 428.31     428.0   2. Bronchospasm J98.01 519.11   3. Acute respiratory failure with hypoxia J96.01 518.81   4. Community acquired pneumonia J18.9 486   5. Impaired mobility and ADLs Z74.09 799.89   6. Impaired functional mobility, balance, gait, and endurance Z74.09 V49.89     Patient Active Problem List   Diagnosis   • Hypertension   • Arthritis   • Altered mental status   • Transient cerebral ischemia   • Atrial fibrillation, no anticoags due to hx GIB and fall risk   • Pacemaker at end of battery life   • Pacemaker-dependent due to native cardiac rhythm insufficient to support life   • Failure to thrive in adult   • Acute blood loss anemia   • GI bleed   • Acute diastolic (congestive) heart failure   • Pulmonary hypertension   • Acute hypoxic respiratory failure   • Healthcare Associated Pneumonia   • Valvular heart disease     Past Medical History   Diagnosis Date   • Arthritis    • Atrial fibrillation    • Breast cancer    • Colon cancer    • Diverticulosis    • Environmental allergies    • Glaucoma    • Hypertension    • Irritable bowel    • Skin cancer      Past Surgical History   Procedure Laterality Date   • Colectomy partial / total     • Mastectomy Right    • Multiple tooth extractions       dentures   • Pacemaker implantation            OT ASSESSMENT FLOWSHEET (last 72 hours)      OT Evaluation       17 1430 17 1424 17 1000 17 0947 17 0946    Rehab Evaluation    Document Type evaluation  -DM evaluation  -AR       Subjective Information agree to therapy;complains of;weakness;dyspnea  -DM no  complaints;agree to therapy  -AR       Patient Effort, Rehab Treatment good  -DM excellent  -AR       Symptoms Noted During/After Treatment fatigue;shortness of breath;significant change in vital signs   signif decr. in   -DM none  -AR       General Information    Patient Profile Review  yes  -AR       Onset of Illness/Injury or Date of Surgery Date  02/02/17  -AR       Referring Physician  Dr. Medley  -AR       Pertinent History Of Current Problem  Pt is a 89 yof recently admitted [facility] approx. one month ago and DC to SNF for rehab. Pt presented to [facility] ED after 2-3 days of increasing dyspnea, sinus congestion, wet and productive cough with general malaise.   -AR       Precautions/Limitations  fall precautions;oxygen therapy device and L/min   2L NC  -AR       Prior Level of Function  min assist:;gait;transfer;all household mobility;ADL's;dependent:;community mobility   using wc to get to dining room for meals  -AR       Equipment Currently Used at Home  wheelchair;walker, rolling   tripod walker  -AR  walker, rolling;wheelchair  -SP     Plans/Goals Discussed With  patient and family;agreed upon  -AR       Risks Reviewed  patient and family:;LOB;nausea/vomiting;dizziness;increased discomfort;change in vital signs;increased drainage;lines disloged  -AR       Benefits Reviewed  patient and family:;improve function;increase independence;increase strength;increase balance;decrease pain;decrease risk of DVT;increase knowledge  -AR       Barriers to Rehab  none identified  -AR       Living Environment    Lives With  facility resident   [facility]  -AR  facility resident  -SP     Living Arrangements    extended care facility  -SP     Transportation Available  car;family or friend will provide  -AR       Clinical Impression    Date of Referral to OT  01/03/17  -AR       OT Diagnosis  decreased independence with ADLs  -AR       Patient/Family Goals Statement  return to [facility]  -AR       Criteria for Skilled Therapeutic  Interventions Met  yes;treatment indicated  -AR       Rehab Potential  good, to achieve stated therapy goals  -AR       Therapy Frequency  daily   per priority policy  -AR       Anticipated Discharge Disposition  skilled nursing facility  -AR       Functional Level Prior    Ambulation     1-->assistive equipment  -SP    Transferring     1-->assistive equipment  -SP    Bathing     0-->independent  -SP    Dressing     2-->assistive person  -SP    Eating     0-->independent  -SP    Vital Signs    Pre Systolic BP Rehab  123  -AR       Pre Treatment Diastolic BP  82  -AR       Post Systolic BP Rehab  108  -AR       Post Treatment Diastolic BP  67  -AR       Pretreatment Heart Rate (beats/min)  78  -AR       Posttreatment Heart Rate (beats/min)  87  -AR       Pre SpO2 (%)  94  -AR       O2 Delivery Pre Treatment  supplemental O2   2L NC  -AR       Post SpO2 (%)  93  -AR       O2 Delivery Post Treatment  supplemental O2  -AR       Pain Assessment    Pain Assessment  No/denies pain  -AR       Vision Assessment/Intervention    Visual Impairment  WFL with corrective lenses  -AR       Cognitive Assessment/Intervention    Current Cognitive/Communication Assessment  impaired  -AR       Orientation Status  oriented to;person;place;situation  -AR       Follows Commands/Answers Questions  100% of the time;able to follow single-step instructions;needs cueing  -AR       Personal Safety  mild impairment  -AR       Personal Safety Interventions  fall prevention program maintained  -AR       ROM (Range of Motion)    General ROM  no range of motion deficits identified   BUE  -AR       MMT (Manual Muscle Testing)    General MMT Assessment  upper extremity strength deficits identified   BUE MMT 4/5  -AR       Muscle Tone Assessment    Muscle Tone Assessment   Bilateral Lower Extremities  -SL      Bilateral Lower Extremities Muscle Tone Assessment   moderately decreased tone  -SL      Bed Mobility, Assessment/Treatment    Bed Mobility, Roll  Left, [place]  contact guard assist  -AR       Bed Mobility, Roll Right, [place]  contact guard assist  -AR       Bed Mobility, Scoot/Bridge, [place]  contact guard assist  -AR       Bed Mob, Supine to Sit, [place]  minimum assist (75% patient effort);verbal cues required  -AR       Bed Mobility, Impairments  strength decreased  -AR       Transfer Assessment/Treatment    Transfers, Bed-Chair [place]  minimum assist (75% patient effort);verbal cues required  -AR       Transfers, Bed-Chair-Bed, Assist Device  rolling walker  -AR       Transfers, Sit-Stand [place]  minimum assist (75% patient effort);verbal cues required  -AR       Transfers, Stand-Sit [place]  verbal cues required;minimum assist (75% patient effort)  -AR       Transfers, Sit-Stand-Sit, Assist Device  rolling walker  -AR       Transfer, Impairments  strength decreased;other (see comments)   decreased safety awareness  -AR       Transfer, Comment  pt with unsafe chair approach, attempting to park walker prior to being at chair, poor control of stand-to-sit transition  -AR       Functional Mobility    Functional Mobility- Ind. Level  minimum assist (75% patient effort);verbal cues required  -AR       Functional Mobility- Device  rolling walker  -AR       Functional Mobility- Comment  cues for safety and to stay inside of walker  -AR       Upper Body Dressing Assessment/Training    UB Dressing Assess/Train, Clothing Type  doffing:;donning:;hospital gown  -AR       UB Dressing Assess/Train, Position  supine  -AR       UB Dressing Assess/Train, [place]  minimum assist (75% patient effort)  -AR       Toileting Assessment/Training    Toileting Assess/Train, Position  supine  -AR       Toileting Assess/Train, Indepen Level  dependent (less than 25% patient effort)  -AR       Therapy Exercises    Bilateral Upper Extremity  AROM:;10 reps;supine  -AR       General Therapy Interventions    Transfer Training  reviewed safe  hand palcement for transfers and safe chair approach for fall prevention  -AR       Positioning and Restraints    Pre-Treatment Position  in bed  -AR       Post Treatment Position  chair  -AR       In Chair  reclined;call light within reach;encouraged to call for assist;with family/caregiver;with PT  -AR         01/02/17 2200                General Information    Equipment Currently Used at Home walker, rolling  -AP        Living Environment    Lives With facility resident  -AP        Living Arrangements residential facility  -AP        Home Accessibility no concerns  -AP        Stair Railings at Home none  -AP        Type of Financial/Environmental Concern none  -AP        Transportation Available none  -AP        Functional Level Prior    Ambulation 3-->assistive equipment and person  -AP        Transferring 3-->assistive equipment and person  -AP        Toileting 3-->assistive equipment and person  -AP        Bathing 3-->assistive equipment and person  -AP        Dressing 2-->assistive person  -AP        Eating 0-->independent  -AP        Communication 0-->understands/communicates without difficulty  -AP        Swallowing 0-->swallows foods/liquids without difficulty  -AP          User Key  (r) = Recorded By, (t) = Taken By, (c) = Cosigned By    Initials Name Effective Dates    DM Kiera Bennett, PT 06/19/15 -     AR Cristy Garcia, OT 06/22/15 -     SL Lynnette Garcia RN 06/16/16 -     SP Haily Shoemaker, LAVONNE 03/14/16 -     AP Robina Kimball RN 06/16/16 -            Occupational Therapy Education     Title: PT OT SLP Therapies (Done)     Topic: Occupational Therapy (Done)     Point: ADL training (Done)    Description: Instruct learner(s) on proper safety adaptation and remediation techniques during self care or transfers.   Instruct in proper use of assistive devices.    Learning Progress Summary    Learner Readiness Method Response Comment Documented by Status   Patient Eager E,TB,D VU,NR Education provided  on ADL retraining, transfer traiing, bed mobility, goals of care AR 01/03/17 1607 Done   Family Eagreagan ROBLERO,ELIOT,D VU,NR Education provided on ADL retraining, transfer traiing, bed mobility, goals of care AR 01/03/17 1607 Done               Point: Home exercise program (Done)    Description: Instruct learner(s) on appropriate technique for monitoring, assisting and/or progressing therapeutic exercises/activities.    Learning Progress Summary    Learner Readiness Method Response Comment Documented by Status   Patient Eager E,ELIOT,BERTHA VU,NR Education provided on ADL retraining, transfer traiing, bed mobility, goals of care AR 01/03/17 1607 Done   Family Eager E,ELIOT,D VU,NR Education provided on ADL retraining, transfer traiing, bed mobility, goals of care AR 01/03/17 1607 Done               Point: Precautions (Done)    Description: Instruct learner(s) on prescribed precautions during self-care and functional transfers.    Learning Progress Summary    Learner Readiness Method Response Comment Documented by Status   Patient Eager E,BERTHA MCCABE VU,NR Education provided on ADL retraining, transfer traiing, bed mobility, goals of care AR 01/03/17 1607 Done   Family Eagreagan ROBLERO,BERTHA MCCABE VU,NR Education provided on ADL retraining, transfer traiing, bed mobility, goals of care AR 01/03/17 1607 Done               Point: Body mechanics (Done)    Description: Instruct learner(s) on proper positioning and spine alignment during self-care, functional mobility activities and/or exercises.    Learning Progress Summary    Learner Readiness Method Response Comment Documented by Status   Patient Eager E,BERTHA MCCABE VU,NR Education provided on ADL retraining, transfer traiing, bed mobility, goals of care AR 01/03/17 1607 Done   Family ELIOT Cantrell,D VU,NR Education provided on ADL retraining, transfer traiing, bed mobility, goals of care AR 01/03/17 1607 Done                      User Key     Initials Effective Dates Name Provider Type Discipline    AR 06/22/15 -  Cristy  Vero Garcia, OT Occupational Therapist OT                  OT Recommendation and Plan  Anticipated Discharge Disposition: skilled nursing facility  Therapy Frequency: daily (per priority policy)  Plan of Care Review  Plan Of Care Reviewed With: patient, daughter  Progress: progress toward functional goals as expected  Outcome Summary/Follow up Plan: OT eval complete, recommend SNF-level rehab at [place].          OT Goals       01/03/17 1608          Transfer Training OT LTG    Transfer Training OT LTG, Date Established 01/03/17  -AR      Transfer Training OT LTG, Time to Achieve 1 wk  -AR      Transfer Training OT LTG, Activity Type sit to stand/stand to sit;toilet  -AR      Transfer Training OT LTG, [place] Level verbal cues required;contact guard assist  -AR      Transfer Training OT LTG, Assist Device commode, bedside;walker, rolling  -AR      Patient Education OT LTG    Patient Education OT LTG, Date Established 01/03/17  -AR      Patient Education OT LTG, Time to Achieve 1 wk  -AR      Patient Education OT LTG, Education Type HEP;home safety;energy conservation;work simplification;adaptive breathing  -AR      Patient Education OT LTG, Education Understanding demonstrates adequately;verbalizes understanding  -AR      LB Dressing OT LTG    LB Dressing Goal OT LTG, Date Established 01/03/17  -AR      LB Dressing Goal OT LTG, Time to Achieve by discharge  -AR      LB Dressing Goal OT LTG, Activity Type Pt will complete LB dressing task   -AR      LB Dressing Goal OT LTG, [place] Level verbal cues required;moderate assist (50% patient effort)  -AR      LB Dressing Goal OT LTG, Adaptive Equipment --   AE PRN  -AR      UB Dressing OT LTG    UB Dressing Goal OT LTG, Date Established 01/03/17  -AR      UB Dressing Goal OT LTG, Time to Achieve by discharge  -AR      UB Dressing Goal OT LTG, Activity Type Pt will complete UB dressing task   -AR      UB Dressing Goal OT LTG, [place] Level verbal cues  required;supervision required  -AR        User Key  (r) = Recorded By, (t) = Taken By, (c) = Cosigned By    Initials Name Provider Type    AR Cristy Garcia OT Occupational Therapist                Outcome Measures       01/03/17 1424          How much help from another is currently needed...    Putting on and taking off regular lower body clothing? 2  -AR      Bathing (including washing, rinsing, and drying) 2  -AR      Toileting (which includes using toilet bed pan or urinal) 1  -AR      Putting on and taking off regular upper body clothing 3  -AR      Taking care of personal grooming (such as brushing teeth) 3  -AR      Eating meals 4  -AR      Score 15  -AR      Functional Assessment    Outcome Measure Options AM-PAC 6 Clicks Daily Activity (OT)  -AR        User Key  (r) = Recorded By, (t) = Taken By, (c) = Cosigned By    Initials Name Provider Type    NORA Garcia OT Occupational Therapist          Time Calculation:   OT Start Time: 1424    Therapy Charges for Today     Code Description Service Date Service Provider Modifiers Qty    [id number]  OT EVAL MOD COMPLEXITY 3 1/3/2017 Cristy Garcia OT GO 1    [id number]  OT THERAPEUTIC ACT EA 15 MIN 1/3/2017 Cristy Garcia OT GO 1    [id number]  OT THER SUPP EA 15 MIN 1/3/2017 Cristy Garcia OT GO 3               Cristy Garcia OT  1/3/2017

## 2017-01-03 NOTE — PROGRESS NOTES
"2nd visit today, admitted early this morning by partner. Patient feeling much improved after some intravenous loop diuretic, got lasix 40mg iv last night and bumex 1mg iv this a.m. Patient has been off digoxin and coreg since discharge from our facilty (was admitted 11/23-->12/1 w/ possible tia, massive melena/upper gi bleed, end of life pacemaker battery life). Was discharged off of av miguel agents and off of diuretics. Since that time at assisted living facility patient gradually \"retaining fluid\", gradual dyspnea. A physician assisted at her facility started some torsemide about a week ago. Then found profoundly dyspneic w/ sats 70's per daughter so brought to e.r.    Currently feeling better, denies fever. Daughter really hopes to talk to dr. sunshine this admission      *Acute Hypoxic Respiratory Failure  *Acute DHF (valvular heart dz and severe PHTN)  *Bilateral Pulm Infiltrates (chf but cannot r/o underlying pneumonia)  *Hx afib/sss/pacer   -apparently w/ near end of life battery life; saw dr. plata last admission and generator changed was deferred at that time   -not anticoagulation candidate due to massive melena/gi bleed last admission  *Hx CAD  *Hx TIA  *Dementia  *Hx GI bleed/melena (requiring blood transfusion) November 2016    Plan:  D/c levaquin  D/c vanc  Continue zosyn  Start doxy  Sputum culture; follow blood culture  Continue diuresis, bumex 1mg iv daily for now (improved on this)  Consult dr. Sunshine/cards per daughter request (sees dr. sunshine as outpt)  Bmp in a.m.  "

## 2017-01-03 NOTE — PROGRESS NOTES
"Adult Nutrition  Assessment/PES    Patient Name:  Avril Allen  YOB: 1921  MRN: [id number]  Admit Date:  1/2/2017    Assessment Date:  1/3/2017        Reason for Assessment       01/03/17 1527    Reason for Assessment    Reason For Assessment/Visit admission assessment    Identified At Risk By Screening Criteria unintentional loss of 10 lbs or more in the past 2 mos;MST SCORE 2+    Time Spent (min) 20    Cardiac CHF              Nutrition/Diet History       01/03/17 1528    Nutrition/Diet History    Reported/Observed By Patient    Appetite Poor Whenever Ill   Pt states she does not like the food at NH.    Food Habit/Preferences Uses Supplements   Pt states she will drink some Boost supplements    Mental State/Condition Weakness            Anthropometrics       01/03/17 1529    Anthropometrics    RD Documented Weight on Admission 50.8 kg (112 lb 1 oz)    Anthropometrics (Special Considerations)    Height Used for Calculations 1.575 m (5' 2.01\")    Usual Body Weight (UBW)    Usual Body Weight 54.4 kg (120 lb)    Weight Loss 3.629 kg (8 lb)    Weight Loss Time Frame 2 months            Labs/Tests/Procedures/Meds       01/03/17 1530    Labs/Tests/Procedures/Meds    Labs/Tests Review Reviewed                Nutrition Prescription Ordered       01/03/17 1530    Nutrition Prescription PO    Current PO Diet Soft Texture    Texture Whole foods    Common Modifiers Cardiac;Low Sodium            Evaluation of Received Nutrient/Fluid Intake       01/03/17 1530    PO Evaluation    Number of Days PO Intake Evaluated Insufficient Data              Problem/Interventions:        Problem 1       01/03/17 1530    Nutrition Diagnoses Problem 1    Problem 1 Unintended Weight Loss    Etiology (related to) Medical Diagnosis    Cardiac CHF    Gastrointestinal --   Recent GI bleed, resolved but pt states she did not eat well during that time.    Signs/Symptoms (evidenced by) Report of Mnimal PO Intake                  "   Intervention Goal       01/03/17 1532    Intervention Goal    General Nutrition support treatment    PO Establish PO;Tolerate PO            Nutrition Intervention       01/03/17 1532    Nutrition Intervention    RD/Tech Action Advise alternate selection;Encourage intake;Supplement provided;Follow Tx progress;Interview for preference            Nutrition Prescription       01/03/17 1532    Nutrition Prescription PO    PO Prescription Begin/change supplement    Supplement Boost Plus    Supplement Frequency 2 times a day    New PO Prescription Ordered? Yes            Education/Evaluation       01/03/17 1532    Monitor/Evaluation    Monitor Per protocol;PO intake;Supplement intake        Comments:      Electronically signed by:  Johnna Rogers  01/03/17 3:32 PM

## 2017-01-03 NOTE — PLAN OF CARE
Problem: Patient Care Overview (Adult)  Goal: Plan of Care Review  Outcome: Ongoing (interventions implemented as appropriate)    01/03/17 1608   Coping/Psychosocial Response Interventions   Plan Of Care Reviewed With patient;daughter   Patient Care Overview   Progress progress toward functional goals as expected   Outcome Evaluation   Outcome Summary/Follow up Plan OT russell complete, [facility]         Problem: Inpatient Occupational Therapy  Goal: Transfer Training Goal 1 LTG- OT  Outcome: Ongoing (interventions implemented as appropriate)    01/03/17 1608   Transfer Training OT LTG   Transfer Training OT LTG, Date Established 01/03/17   Transfer Training OT LTG, Time to Achieve 1 wk   Transfer Training OT LTG, Activity Type sit to stand/stand to sit;toilet   Transfer Training OT LTG, [place] Level verbal cues required;contact guard assist   Transfer Training OT LTG, Assist Device commode, bedside;walker, rolling       Goal: Patient Education Goal LTG- OT  Outcome: Ongoing (interventions implemented as appropriate)    01/03/17 1608   Patient Education OT LTG   Patient Education OT LTG, Date Established 01/03/17   Patient Education OT LTG, Time to Achieve 1 wk   Patient Education OT LTG, Education Type HEP;home safety;energy conservation;work simplification;adaptive breathing   Patient Education OT LTG, Education Understanding demonstrates adequately;verbalizes understanding       Goal: LB Dressing Goal LTG- OT  Outcome: Ongoing (interventions implemented as appropriate)    01/03/17 1608   LB Dressing OT LTG   LB Dressing Goal OT LTG, Date Established 01/03/17   LB Dressing Goal OT LTG, Time to Achieve by discharge   LB Dressing Goal OT LTG, Activity Type Pt will complete LB dressing task    LB Dressing Goal OT LTG, [place] Level verbal cues required;moderate assist (50% patient effort)   LB Dressing Goal OT LTG, Adaptive Equipment (AE PRN)       Goal: UB Dressing Goal LTG-  OT  Outcome: Ongoing (interventions implemented as appropriate)    01/03/17 1608   UB Dressing OT LTG   UB Dressing Goal OT LTG, Date Established 01/03/17   UB Dressing Goal OT LTG, Time to Achieve by discharge   UB Dressing Goal OT LTG, Activity Type Pt will complete UB dressing task    UB Dressing Goal OT LTG, [place] Level verbal cues required;supervision required

## 2017-01-03 NOTE — PROGRESS NOTES
Discharge Planning Assessment  [facility]     Patient Name: Avril Allen  MRN: [id number]  Today's Date: 1/3/2017    Admit Date: 1/2/2017          Discharge Needs Assessment       01/03/17 0947    Living Environment    Lives With facility resident    Living Arrangements extended care facility    Living Arrangement Comments Pt is from [facility]    Discharge Needs Assessment    Equipment Currently Used at Home walker, rolling;wheelchair    Equipment Needed After Discharge none    Discharge Planning Comments Pt is from assisted living. She will need to be back to her baseline to return.            Discharge Plan       01/03/17 0949    Case Management/Social Work Plan    Plan Assisted living vs SNF    Patient/Family In Agreement With Plan yes    Additional Comments I spoke with the pt. She is from assisted living. [facility] will need to do an onsite eval before she can return. She will need to be back to baseline. Please order PT/OT to assess if not already ordered.        Discharge Placement     No information found        Expected Discharge Date and Time     Expected Discharge Date Expected Discharge Time    Jan 6, 2017               Demographic Summary     None            Functional Status       01/03/17 0946    Functional Status Prior    Ambulation 1-->assistive equipment    Transferring 1-->assistive equipment    Bathing 0-->independent    Dressing 2-->assistive person    Eating 0-->independent    IADL    Medications assistive person    Meal Preparation completely dependent    Housekeeping completely dependent    Laundry completely dependent    Shopping completely dependent    Oral Care independent            Psychosocial     None            Abuse/Neglect     None            Legal     None            Substance Abuse     None            Patient Forms     None          Haily Shoemaker RN

## 2017-01-03 NOTE — PLAN OF CARE
Problem: Fall Risk (Adult)  Goal: Identify Related Risk Factors and Signs and Symptoms  Outcome: Ongoing (interventions implemented as appropriate)  Goal: Absence of Falls  Outcome: Ongoing (interventions implemented as appropriate)    Problem: Cardiac: Heart Failure (Adult)  Goal: Signs and Symptoms of Listed Potential Problems Will be Absent or Manageable (Cardiac: Heart Failure)  Outcome: Ongoing (interventions implemented as appropriate)

## 2017-01-03 NOTE — PLAN OF CARE
Problem: Patient Care Overview (Adult)  Goal: Plan of Care Review  Outcome: Ongoing (interventions implemented as appropriate)    01/03/17 1634   Coping/Psychosocial Response Interventions   Plan Of Care Reviewed With patient;daughter   Patient Care Overview   Progress progress towards functional goals is fair   Outcome Evaluation   Outcome Summary/Follow up Plan pt. adm w/ acute DHF, dyspnea, A fib, fluid retention, & recent hosp w/ UGI BL & blood transf.(just d/c'd to rehab 12-1);presents w/ evolving SS ,including decr. orthostat BP'S (DBP dropped from 82 to 67 w/ sup to sit ), irreg. HR (fluct. from 78 to 93 d/t Afib), o2 sats in low 90's w/ activ, & SOB/fatigue w/ mobilizing to chair;will benefit from IPPT for strengthening/monitored exercise, prog mobil trng& instruction in fall prevention (pt demonstrates impulsive behavior w/ transfers/gt; poor retention of safety cues); goal is to ret. to rehab at SNF          Problem: Inpatient Physical Therapy  Goal: Bed Mobility Goal LTG- PT  Outcome: Ongoing (interventions implemented as appropriate)    01/03/17 1634   Bed Mobility PT LTG   Bed Mobility PT LTG, Date Established 01/03/17   Bed Mobility PT LTG, Time to Achieve 1 wk   Bed Mobility PT LTG, Activity Type all bed mobility   Bed Mobility PT LTG, [place] Level independent       Goal: Transfer Training Goal 1 LTG- PT  Outcome: Ongoing (interventions implemented as appropriate)    01/03/17 1634   Transfer Training PT LTG   Transfer Training PT LTG, Date Established 01/03/17   Transfer Training PT LTG, Time to Achieve 1 wk   Transfer Training PT LTG, Activity Type bed to chair /chair to bed;sit to stand/stand to sit;toilet   Transfer Training PT LTG, [place] Level supervision required   Transfer Training PT LTG, Assist Device walker, rolling       Goal: Gait Training Goal LTG- PT  Outcome: Ongoing (interventions implemented as appropriate)    01/03/17 1634   Gait Training PT LTG   Gait Training Goal PT  LTG, Date Established 01/03/17   Gait Training Goal PT LTG, Time to Achieve 1 wk   Gait Training Goal PT LTG, [place] Level contact guard assist   Gait Training Goal PT LTG, Assist Device walker, rolling   Gait Training Goal PT LTG, Distance to Achieve 30  (w/ stable VS)

## 2017-01-03 NOTE — PROGRESS NOTES
Pharmacy to dose Zosyn. Dx HCAP in 89 yo F, 50.8 kg, CrCl 24.  Phcy to monitor renal function but will leave on the 3.375 g q6h due to the pneum dx.

## 2017-01-03 NOTE — PROGRESS NOTES
Acute Care - Physical Therapy Initial Evaluation  [facility]     Patient Name: Avril Allen  : 1921  MRN: [id number]  Today's Date: 1/3/2017   Onset of Illness/Injury or Date of Surgery Date: 17  Date of Referral to PT: 17  Referring Physician: Jackelin Medley MD      Admit Date: 2017     Visit Dx:    ICD-10-CM ICD-9-CM   1. Acute diastolic (congestive) heart failure I50.31 428.31     428.0   2. Bronchospasm J98.01 519.11   3. Acute respiratory failure with hypoxia J96.01 518.81   4. Community acquired pneumonia J18.9 486   5. Impaired mobility and ADLs Z74.09 799.89   6. Impaired functional mobility, balance, gait, and endurance Z74.09 V49.89     Patient Active Problem List   Diagnosis   • Hypertension   • Arthritis   • Altered mental status   • Transient cerebral ischemia   • Atrial fibrillation, no anticoags due to hx GIB and fall risk   • Pacemaker at end of battery life   • Pacemaker-dependent due to native cardiac rhythm insufficient to support life   • Failure to thrive in adult   • Acute blood loss anemia   • GI bleed   • Acute diastolic (congestive) heart failure   • Pulmonary hypertension   • Acute hypoxic respiratory failure   • Healthcare Associated Pneumonia   • Valvular heart disease     Past Medical History   Diagnosis Date   • Arthritis    • Atrial fibrillation    • Breast cancer    • Colon cancer    • Diverticulosis    • Environmental allergies    • Glaucoma    • Hypertension    • Irritable bowel    • Skin cancer      Past Surgical History   Procedure Laterality Date   • Colectomy partial / total     • Mastectomy Right    • Multiple tooth extractions       dentures   • Pacemaker implantation            PT ASSESSMENT (last 72 hours)      PT Evaluation       17 1430 17 1424    Rehab Evaluation    Document Type evaluation  -DM evaluation  -AR    Subjective Information agree to therapy;complains of;weakness;dyspnea  -DM no complaints;agree to therapy  -AR    Patient  Effort, Rehab Treatment good  -DM excellent  -AR    Symptoms Noted During/After Treatment fatigue;shortness of breath;significant change in vital signs   signif decr. in   -DM none  -AR    General Information    Patient Profile Review yes  -DM yes  -AR    Onset of Illness/Injury or Date of Surgery Date 01/02/17  -DM 02/02/17  -AR    Referring Physician Jackelin Medley MD  -DM Dr. Medley  -AR    Pertinent History Of Current Problem Recent adm [facility] 11-23 to 12-1 w/ UGI BL./melena & blood transfusion;taken off Dig[facility] & CorReg;to [facility] for rehab;onset incr,. dyspnea, sinus congest, prod cough,retaining fluid, sats in 70's, & malaise x 2-3 days;to [facility] ED W/ acute diastolic heart failure;CXR 1-2:PULM edema;given Bumex & lasix;o2 sats improved to 98% on 4L;  h/o Dem, Afib,SSS,TIA & [place]  -DM Pt is a 89 yof recently admitted [facility] approx. one month ago and DC to SNF for rehab. Pt presented to [facility] ED after 2-3 days of increasing dyspnea, sinus congestion, wet and productive cough with general malaise.   -AR    Precautions/Limitations fall precautions;oxygen therapy device and L/min  -DM fall precautions;oxygen therapy device and L/min   2L NC  -AR    Prior Level of Function min assist:;gait;transfer;bed mobility;ADL's;dependent:;community mobility   gt. w/ tripod R wx;using w/c to get to dining room for meals  -DM min assist:;gait;transfer;all household mobility;ADL's;dependent:;community mobility   using wc to get to dining room for meals  -AR    Equipment Currently Used at Home wheelchair;walker, rolling;cane, straight   tripod R wx  -DM wheelchair;walker, rolling   tripod walker  -AR    Plans/Goals Discussed With patient and family;agreed upon  -DM patient and family;agreed upon  -AR    Risks Reviewed patient and family:;LOB;dizziness;increased discomfort;change in vital signs;lines disloged  -DM patient and family:;LOB;nausea/vomiting;dizziness;increased discomfort;change in vital signs;increased drainage;lines disloged   -AR    Benefits Reviewed patient and family:;improve function;increase independence;increase strength;increase balance;increase knowledge  -DM patient and family:;improve function;increase independence;increase strength;increase balance;decrease pain;decrease risk of DVT;increase knowledge  -AR    Barriers to Rehab hearing deficit  -DM none identified  -AR    Living Environment    Lives With facility resident   [facility]  -DM facility resident   [facility]  -AR    Living Arrangements extended care facility  -DM     Home Accessibility no concerns  -DM     Stair Railings at Home none  -DM     Type of Financial/Environmental Concern none  -DM     Transportation Available car;family or friend will provide  -DM car;family or friend will provide  -AR    Clinical Impression    Date of Referral to PT 01/02/17  -DM     PT Diagnosis impaired funct mobili.  -DM     Criteria for Skilled Therapeutic Interventions Met yes;treatment indicated  -DM     Impairments Found (describe specific impairments) gait, locomotion, and balance  -DM     Rehab Potential good, to achieve stated therapy goals  -DM     Vital Signs    Pre Systolic BP Rehab 123  -  -AR    Pre Treatment Diastolic BP 82  -DM 82  -AR    Post Systolic BP Rehab 108  -  -AR    Post Treatment Diastolic BP 67  -DM 67  -AR    Pretreatment Heart Rate (beats/min) 78  -DM 78  -AR    Intratreatment Heart Rate (beats/min) 93  -DM     Posttreatment Heart Rate (beats/min) 87  -DM 87  -AR    Pre SpO2 (%) 94  -DM 94  -AR    O2 Delivery Pre Treatment supplemental O2  -DM supplemental O2   2L NC  -AR    Post SpO2 (%) 93  -DM 93  -AR    O2 Delivery Post Treatment supplemental O2  -DM supplemental O2  -AR    Pre Patient Position Supine  -DM     Intra Patient Position Standing  -DM     Post Patient Position Sitting  -DM     Pain Assessment    Pain Assessment No/denies pain  -DM No/denies pain  -AR    Vision Assessment/Intervention    Visual Impairment WFL with  corrective lenses  -DM WFL with corrective lenses  -AR    Cognitive Assessment/Intervention    Current Cognitive/Communication Assessment impaired  -DM impaired  -AR    Orientation Status oriented to;person;place;situation  -DM oriented to;person;place;situation  -AR    Follows Commands/Answers Questions 100% of the time;able to follow single-step instructions;needs cueing;needs increased time;needs repetition  -% of the time;able to follow single-step instructions;needs cueing  -AR    Personal Safety mild impairment;decreased awareness, need for assist;decreased awareness, need for safety  -DM mild impairment  -AR    Personal Safety Interventions fall prevention program maintained;gait belt;nonskid shoes/slippers when out of bed  -DM fall prevention program maintained  -AR    ROM (Range of Motion)    General ROM lower extremity range of motion deficits identified   B hips limited 25 %  -DM no range of motion deficits identified   BUE  -AR    MMT (Manual Muscle Testing)    General MMT Assessment lower extremity strength deficits identified   B hips grossly 4/5  -DM upper extremity strength deficits identified   BUE MMT 4/5  -AR    Muscle Tone Assessment    Muscle Tone Assessment Bilateral Lower Extremities  -DM     Bilateral Lower Extremities Muscle Tone Assessment moderately decreased tone  -DM     Bed Mobility, Assessment/Treatment    Bed Mobility, Assistive Device bed rails;head of bed elevated  -DM     Bed Mobility, Roll Left, [place] verbal cues required;contact guard assist   pt incont (BM in depends);changed brief,pads via rolling  -DM contact guard assist  -AR    Bed Mobility, Roll Right, [place] verbal cues required;contact guard assist  -DM contact guard assist  -AR    Bed Mobility, Scoot/Bridge, [place] contact guard assist  -DM contact guard assist  -AR    Bed Mob, Supine to Sit, [place] minimum assist (75% patient effort);verbal cues required  -DM minimum assist (75% patient  effort);verbal cues required  -AR    Bed Mobility, Impairments strength decreased  -DM strength decreased  -AR    Transfer Assessment/Treatment    Transfers, Bed-Chair [place] verbal cues required;minimum assist (75% patient effort)  -DM minimum assist (75% patient effort);verbal cues required  -AR    Transfers, Bed-Chair-Bed, Assist Device rolling walker  -DM rolling walker  -AR    Transfers, Sit-Stand [place] minimum assist (75% patient effort);2 person assist required  -DM minimum assist (75% patient effort);verbal cues required  -AR    Transfers, Stand-Sit [place] verbal cues required;minimum assist (75% patient effort)  -DM verbal cues required;minimum assist (75% patient effort)  -AR    Transfers, Sit-Stand-Sit, Assist Device rolling walker  -DM rolling walker  -AR    Transfer, Impairments strength decreased;other (see comments)   impulsive;attempts to sit premat.(abandoned A.D.)  -DM strength decreased;other (see comments)   decreased safety awareness  -AR    Transfer, Comment poor compliance w./ safety cues;not reaching for armrests  -DM pt with unsafe chair approach, attempting to park walker prior to being at chair, poor control of stand-to-sit transition  -AR    Gait Assessment/Treatment    Gait, [place] Level verbal cues required;minimum assist (75% patient effort);2 person assist required  -DM     Gait, Assistive Device rolling walker  -DM     Gait, Distance (Feet) 18  -DM     Gait, Gait Pattern Analysis swing-to gait  -DM     Gait, Gait Deviations ranjit decreased;decreased heel strike;narrow base;step length decreased  -DM     Gait, Safety Issues step length decreased;supplemental O2;weight-shifting ability decreased  -DM     Gait, Impairments strength decreased;impaired balance  -DM     Gait, Comment c/o fixed wheel R wx not at maneuverable as tripod R wx  -DM     Motor Skills/Interventions    Additional Documentation Balance Skills Training (Group)  -DM     Balance Skills  Training    Sitting-Level of Assistance Close supervision  -DM     Sitting-Balance Support Feet supported  -DM     Sitting-Balance Activities Trunk control activities   scooting; LE exer;took BP  -DM     Sitting # of Minutes 5  -DM     Standing-Level of Assistance Minimum assistance;x2  -DM     Static Standing Balance Support assistive device  -DM     Standing-Balance Activities Weight Shift A-P;Weight Shift R-L  -DM     Standing Balance # of Minutes 1   1/2 min.atEOB;1/2min from chair while chair pad alarm placed  -DM     Therapy Exercises    Bilateral Lower Extremities AROM:;10 reps;sitting;glut sets;ankle pumps/circles;heel slides;hip abduction/adduction;hip ER;hip IR;hip flexion;LAQ  -DM     Bilateral Upper Extremity  AROM:;10 reps;supine  -AR    General Interventions    Transfer Training  reviewed safe hand palcement for transfers and safe chair approach for fall prevention  -AR    Positioning and Restraints    Pre-Treatment Position in bed  -DM in bed  -AR    Post Treatment Position chair  -DM chair  -AR    In Chair sitting;call light within reach;exit alarm on;with family/caregiver;with other staff;RUE elevated;LUE elevated;legs elevated  -DM reclined;call light within reach;encouraged to call for assist;with family/caregiver;with PT  -AR      01/03/17 1000 01/03/17 0947    General Information    Equipment Currently Used at Home  walker, rolling;wheelchair  -SP    Living Environment    Lives With  facility resident  -SP    Living Arrangements  extended care facility  -SP    Muscle Tone Assessment    Muscle Tone Assessment Bilateral Lower Extremities  -SL     Bilateral Lower Extremities Muscle Tone Assessment moderately decreased tone  -SL       01/02/17 2200       General Information    Equipment Currently Used at Home walker, rolling  -AP     Living Environment    Lives With facility resident  -AP     Living Arrangements residential facility  -AP     Home Accessibility no concerns  -AP     Stair Railings at  Home none  -AP     Type of Financial/Environmental Concern none  -AP     Transportation Available none  -AP       User Key  (r) = Recorded By, (t) = Taken By, (c) = Cosigned By    Initials Name Provider Type    DM Kiera Bennett, PT Physical Therapist    AR Cristy Garcia, OT Occupational Therapist    MICHAELA Garcia, RN Registered Nurse    SP Haily Shoemaker, RN Case Manager    AP Robina Kimball, RN Registered Nurse          Physical Therapy Education     Title: PT OT SLP Therapies (Active)     Topic: Physical Therapy (Active)     Point: Mobility training (Active)    Learning Progress Summary    Learner Readiness Method Response Comment Documented by Status   Patient Eager E,D NR  DM 01/03/17 1634 Active    Eager E VU  AP 01/03/17 0056 Done   Family Eager E,D NR   01/03/17 1634 Active               Point: Home exercise program (Active)    Learning Progress Summary    Learner Readiness Method Response Comment Documented by Status   Patient Eager E,D NR  DM 01/03/17 1634 Active    Eager E VU  AP 01/03/17 0056 Done   Family Eager E,D NR  DM 01/03/17 1634 Active               Point: Body mechanics (Active)    Learning Progress Summary    Learner Readiness Method Response Comment Documented by Status   Patient Eager E,D NR  DM 01/03/17 1634 Active    Eager E VU  AP 01/03/17 0056 Done   Family Eager E,D NR  DM 01/03/17 1634 Active               Point: Precautions (Active)    Learning Progress Summary    Learner Readiness Method Response Comment Documented by Status   Patient Eager E,D NR  DM 01/03/17 1634 Active    Eager E VU  AP 01/03/17 0056 Done   Family Eager E,D NR   01/03/17 1634 Active                      User Key     Initials Effective Dates Name Provider Type Discipline     06/19/15 -  Kiera Bennett, PT Physical Therapist PT     06/16/16 -  Robina Kimball, RN Registered Nurse Nurse                PT Recommendation and Plan  Anticipated Discharge Disposition: skilled nursing facility  PT  Frequency: daily  Plan of Care Review  Plan Of Care Reviewed With: patient, daughter  Progress: progress towards functional goals is fair  Outcome Summary/Follow up Plan: pt. adm w/ acute DHF, dyspnea, A fib, fluid retention, & recent hosp w/ UGI BL & blood transf.(just d/c'd to rehab 12-1);presents w/ evolving SS ,including  decr. orthostat BP'S (DBP dropped from 82 to 67 w/ sup to sit ), irreg. HR (fluct. from 78 to 93 d/t Afib), o2 sats in low 90's w/ activ, & SOB/fatigue w/ mobilizing to chair;will benefit from  IPPT for strengthening/monitored exercise, prog mobil trng& instruction in fall prevention (pt demonstrates impulsive behavior w/ transfers/gt; poor retention of safety cues); goal is to ret. to rehab at [facility]           IP PT Goals       01/03/17 1634          Bed Mobility PT LTG    Bed Mobility PT LTG, Date Established 01/03/17  -DM      Bed Mobility PT LTG, Time to Achieve 1 wk  -DM      Bed Mobility PT LTG, Activity Type all bed mobility  -DM      Bed Mobility PT LTG, [place] Level independent  -DM      Transfer Training PT LTG    Transfer Training PT LTG, Date Established 01/03/17  -DM      Transfer Training PT LTG, Time to Achieve 1 wk  -DM      Transfer Training PT LTG, Activity Type bed to chair /chair to bed;sit to stand/stand to sit;toilet  -DM      Transfer Training PT LTG, [place] Level supervision required  -DM      Transfer Training PT LTG, Assist Device walker, rolling  -DM      Gait Training PT LTG    Gait Training Goal PT LTG, Date Established 01/03/17  -DM      Gait Training Goal PT LTG, Time to Achieve 1 wk  -DM      Gait Training Goal PT LTG, [place] Level contact guard assist  -DM      Gait Training Goal PT LTG, Assist Device walker, rolling  -DM      Gait Training Goal PT LTG, Distance to Achieve 30   w/ stable VS  -DM        User Key  (r) = Recorded By, (t) = Taken By, (c) = Cosigned By    Initials Name Provider Type    NICHOLAS Bennett, PT Physical Therapist                 Outcome Measures       01/03/17 1430 01/03/17 1424       How much help from another person do you currently need...    Turning from your back to your side while in flat bed without using bedrails? 3  -DM      Moving from lying on back to sitting on the side of a flat bed without bedrails? 3  -DM      Moving to and from a bed to a chair (including a wheelchair)? 3  -DM      Standing up from a chair using your arms (e.g., wheelchair, bedside chair)? 3  -DM      Climbing 3-5 steps with a railing? 1  -DM      To walk in hospital room? 3  -DM      AM-PAC 6 Clicks Score 16  -DM      How much help from another is currently needed...    Putting on and taking off regular lower body clothing?  2  -AR     Bathing (including washing, rinsing, and drying)  2  -AR     Toileting (which includes using toilet bed pan or urinal)  1  -AR     Putting on and taking off regular upper body clothing  3  -AR     Taking care of personal grooming (such as brushing teeth)  3  -AR     Eating meals  4  -AR     Score  15  -AR     Functional Assessment    Outcome Measure Options AM-PAC 6 Clicks Basic Mobility (PT)  -DM AM-PAC 6 Clicks Daily Activity (OT)  -AR       User Key  (r) = Recorded By, (t) = Taken By, (c) = Cosigned By    Initials Name Provider Type    DM Kiera Bennett, PT Physical Therapist    AR Cristy Garcia, OT Occupational Therapist           Time Calculation:         PT Charges       01/03/17 1645          Time Calculation    Start Time 1430  -DM      PT Received On 01/03/17  -DM      PT Goal Re-Cert Due Date 01/13/17  -DM      Time Calculation- PT    Total Timed Code Minutes- PT 50 minute(s)  -DM        User Key  (r) = Recorded By, (t) = Taken By, (c) = Cosigned By    Initials Name Provider Type    DM Kiera Bennett, PT Physical Therapist          Therapy Charges for Today     Code Description Service Date Service Provider Modifiers Qty    77472203345  PT EVAL MOD COMPLEXITY 4 1/3/2017 Kiera Bennett, PT GP 1     [id number]  PT THER PROC EA 15 MIN 1/3/2017 Kiera Bennett, PT GP 2    [id number] HC GAIT TRAINING EA 15 MIN 1/3/2017 Kiera Bennett, PT GP 1          PT G-Codes  Outcome Measure Options: AM-PAC 6 Clicks Basic Mobility (PT)      Kiera Bennett, PT  1/3/2017

## 2017-01-03 NOTE — PLAN OF CARE
Problem: Fall Risk (Adult)  Goal: Identify Related Risk Factors and Signs and Symptoms  Outcome: Ongoing (interventions implemented as appropriate)    01/03/17 1744   Fall Risk   Fall Risk: Related Risk Factors age-related changes;bladder function altered;confusion/agitation;history of falls;gait/mobility problems;fatigue/slow reaction;environment unfamiliar;impaired vision   Fall Risk: Signs and Symptoms presence of risk factors       Goal: Absence of Falls  Outcome: Ongoing (interventions implemented as appropriate)    01/03/17 1744   Fall Risk (Adult)   Absence of Falls making progress toward outcome         Problem: Cardiac: Heart Failure (Adult)  Goal: Signs and Symptoms of Listed Potential Problems Will be Absent or Manageable (Cardiac: Heart Failure)  Outcome: Ongoing (interventions implemented as appropriate)    01/03/17 1744   Cardiac: Heart Failure   Problems Assessed (Heart Failure) all   Problems Present (Heart Failure) functional decline/self-care deficit;fluid/electrolyte imbalance;dysrhythmia/arrhythmia;respiratory compromise         Problem: Patient Care Overview (Adult)  Goal: Plan of Care Review  Outcome: Ongoing (interventions implemented as appropriate)    01/03/17 1744   Coping/Psychosocial Response Interventions   Plan Of Care Reviewed With patient;daughter;durable power of    Patient Care Overview   Progress progress towards functional goals is fair   Outcome Evaluation   Outcome Summary/Follow up Plan Pt has been pleasantly confused, but oriented to self and place. Pt worked with physical therapy and ambulated to chair. Pt has been incontinent today. Pt has been in Afib. Pt's family requested cardiology consult be with her regular doctor (Sukhi).        Goal: Adult Individualization and Mutuality  Outcome: Ongoing (interventions implemented as appropriate)    01/03/17 1744   Individualization   Patient Specific Preferences set up tray, pour boost into styrofoam cup   Patient Specific  Goals get back to mobility baseline   Patient Specific Interventions bed alarm, waffle mattress, 2 mepilex on LLE       Goal: Discharge Needs Assessment  Outcome: Ongoing (interventions implemented as appropriate)    01/03/17 0947 01/03/17 1430 01/03/17 1744   Discharge Needs Assessment   Concerns To Be Addressed --  --  basic needs concerns   Discharge Disposition --  --  still a patient   Discharge Planning Comments Pt is from assisted living. She will need to be back to her baseline to return. --  --    Living Environment   Transportation Available --  car;family or friend will provide --    Self-Care   Equipment Currently Used at Home --  wheelchair;walker, rolling;cane, straight  (tripod R wx) --          Problem: Skin Integrity Impairment, Risk/Actual (Adult)  Goal: Identify Related Risk Factors and Signs and Symptoms  Outcome: Ongoing (interventions implemented as appropriate)    01/03/17 1744   Skin Integrity Impairment, Risk/Actual   Skin Integrity Impairment, Risk/Actual: Related Risk Factors age extremes;edema;mechanical factors;immobility;moisture;traumatic injury   Signs and Symptoms (Skin Integrity Impairment) bulla/blister/vesicle;edema       Goal: Skin Integrity/Wound Healing  Outcome: Ongoing (interventions implemented as appropriate)    01/03/17 1744   Skin Integrity Impairment, Risk/Actual (Adult)   Skin Integrity/Wound Healing making progress toward outcome

## 2017-01-04 NOTE — SIGNIFICANT NOTE
12:00   Palliative Team Member Meeting  Attendance:    Dr.Todd Blanco, SHAGUFTA Armstrong, A[facility]  Rosalie Carlson MDiv, [facility]  Arminda Britt, RN, PN, Director  Soni Hearn RN, PN  BRAVO JeffriesW, [facility] [address] [facility]

## 2017-01-04 NOTE — PLAN OF CARE
Problem: Patient Care Overview (Adult)  Goal: Plan of Care Review  Outcome: Ongoing (interventions implemented as appropriate)    01/04/17 1600   Coping/Psychosocial Response Interventions   Plan Of Care Reviewed With patient   Patient Care Overview   Progress no change   Outcome Evaluation   Outcome Summary/Follow up Plan New palliative consult for help with advance care planning. SHAGUFTA Valenzuela met with patient and daughter and she is a conditional code, wants aggressive care up until she dies, states her children will need extra support

## 2017-01-04 NOTE — PROGRESS NOTES
"Acute Care - Physical Therapy Treatment Note  [facility]     Patient Name: Avril Allen  : 1921  MRN: [id number]  Today's Date: 2017  Onset of Illness/Injury or Date of Surgery Date: 17  Date of Referral to PT: 17  Referring Physician: Jackelin Medley MD    Admit Date: 2017    Visit Dx:    ICD-10-CM ICD-9-CM   1. Acute diastolic (congestive) heart failure I50.31 428.31     428.0   2. Bronchospasm J98.01 519.11   3. Acute respiratory failure with hypoxia J96.01 518.81   4. Community acquired pneumonia J18.9 486   5. Impaired mobility and ADLs Z74.09 799.89   6. Impaired functional mobility, balance, gait, and endurance Z74.09 V49.89     Patient Active Problem List   Diagnosis   • Hypertension   • Arthritis   • Altered mental status   • Transient cerebral ischemia   • Atrial fibrillation, no anticoags due to hx GIB and fall risk   • Pacemaker at end of battery life   • Pacemaker-dependent due to native cardiac rhythm insufficient to support life   • Failure to thrive in adult   • Acute blood loss anemia   • GI bleed   • Acute diastolic (congestive) heart failure   • Pulmonary hypertension   • Acute hypoxic respiratory failure   • Healthcare Associated Pneumonia   • Valvular heart disease               Adult Rehabilitation Note       17 0915          Rehab Assessment/Intervention    Discipline physical therapist  -DM      Document Type therapy note (daily note)  -DM      Subjective Information agree to therapy;complains of;weakness;dyspnea   incontBMx2(diarrhea);nsg pagedMD,gave meds;\"I'll sit up>4 hr  -DM      Patient Effort, Rehab Treatment good  -DM      Symptoms Noted During/After Treatment fatigue;shortness of breath;significant change in vital signs   Signif decr.BP RUE mid-gt.(resting on BSC);elev s/p5min rest  -DM      Precautions/Limitations oxygen therapy device and L/min;fall precautions   very impulsive;dumped H20 in bed whenPCT didn't refill cup  -DM      Recorded by [DM] " Kiera Bennett, PT      Vital Signs    Pre Systolic BP Rehab 105   LUE  -DM      Pre Treatment Diastolic BP 69  -DM      Intra Systolic BP Rehab 78   78/46 RUE;5 min later in LUE:102/70  -DM      Intra Treatment Diastolic BP 46  -DM      Post Systolic BP Rehab 100   LUE  -DM      Post Treatment Diastolic BP 72  -DM      Pretreatment Heart Rate (beats/min) 93  -DM      Intratreatment Heart Rate (beats/min) 98  -DM      Posttreatment Heart Rate (beats/min) 93  -DM      Pre SpO2 (%) 99  -DM      O2 Delivery Pre Treatment supplemental O2   2 L  -DM      Intra SpO2 (%) 91  -DM      O2 Delivery Intra Treatment supplemental O2  -DM      Post SpO2 (%) 994  -DM      O2 Delivery Post Treatment supplemental O2  -DM      Pre Patient Position Sitting  -DM      Intra Patient Position Sitting  -DM      Post Patient Position Sitting  -DM      Recorded by [DM] Kiera Bennett, PT      Pain Assessment    Pain Assessment No/denies pain  -DM      Recorded by [DM] Kiera Bennett, PT      Vision Assessment/Intervention    Visual Impairment WFL with corrective lenses  -DM      Recorded by [DM] Kiera Bennett, PT      Cognitive Assessment/Intervention    Current Cognitive/Communication Assessment impaired  -DM      Orientation Status oriented to;person;place;situation  -DM      Follows Commands/Answers Questions 100% of the time;able to follow single-step instructions;needs cueing;needs increased time;needs repetition  -DM      Personal Safety mild impairment;impulsive;decreased awareness, need for assist;decreased awareness, need for safety;decreased insight to deficits  -DM      Personal Safety Interventions elopement precautions initiated;fall prevention program maintained;gait belt;nonskid shoes/slippers when out of bed  -DM      Recorded by [DM] Kiera Bennett, PT      Bed Mobility, Assessment/Treatment    Bed Mobility, Roll Left, [place] other (see comments)   UIC  -DM      Recorded by [DM] Kiera Bennett, PT      Transfer  Assessment/Treatment    Transfers, Sit-Stand [place] moderate assist (50% patient effort);verbal cues required   3 stand.trials;had BM;PCT helped change depends/pads  -DM      Transfers, Stand-Sit [place] verbal cues required;minimum assist (75% patient effort)  -DM      Transfers, Sit-Stand-Sit, Assist Device rolling walker  -DM      Transfer, Safety Issues knees buckling;weight-shifting ability decreased;loses balance backward  -DM      Transfer, Impairments strength decreased;impaired balance;postural control impaired  -DM      Transfer, Comment FREQ reminders for safety/hand placement & shifting wt over C of G  -DM      Recorded by [DM] Kiera Bennett, PT      Gait Assessment/Treatment    Gait, [place] Level minimum assist (75% patient effort);verbal cues required  -DM      Gait, Assistive Device rolling walker  -DM      Gait, Distance (Feet) 20   10 min rest midway;nsg assessed;BP 78/46 (R);retook LUE  -DM      Gait, Gait Pattern Analysis swing-to gait  -DM      Gait, Gait Deviations ranjit decreased;decreased heel strike;knee buckling;forward flexed posture;narrow base;step length decreased;toe-to-floor clearance decreased  -DM      Gait, Safety Issues step length decreased;loses balance backward;supplemental O2  -DM      Gait, Impairments strength decreased;impaired balance;postural control impaired  -DM      Gait, Comment pt wants her tripod R wx from NH  -DM      Recorded by [DM] Kiera Bennett, PT      Motor Skills/Interventions    Additional Documentation Balance Skills Training (Group)  -DM      Recorded by [DM] Kiera Bennett, PT      Balance Skills Training    Sitting-Level of Assistance Close supervision  -DM      Sitting-Balance Support Feet supported  -DM      Sitting-Balance Activities Trunk control activities  -DM      Sitting # of Minutes 10  -DM      Standing-Level of Assistance Moderate assistance   INCR. trunk flex  -DM      Static Standing Balance Support assistive device   -DM      Standing-Balance Activities Weight Shift A-P;Weight Shift R-L  -DM      Standing Balance # of Minutes 6  -DM      Recorded by [DM] Kiera Bennett, PT      Therapy Exercises    Bilateral Lower Extremities AROM:;10 reps;sitting;ankle pumps/circles;heel slides;hip abduction/adduction;hip ER;hip flexion;hip IR;LAQ;quad sets  -DM      Recorded by [DM] Kiera Bennett, PT      Positioning and Restraints    Pre-Treatment Position sitting in chair/recliner  -DM      Post Treatment Position chair  -DM      In Chair sitting;call light within reach;exit alarm on;with nsg;RUE elevated;LUE elevated;waffle cushion;legs elevated  -DM      Recorded by [DM] Kiera Bennett, PT        User Key  (r) = Recorded By, (t) = Taken By, (c) = Cosigned By    Initials Name Effective Dates    DM Kiera Bennett, PT 06/19/15 -                 IP PT Goals       01/04/17 1042 01/03/17 1634       Bed Mobility PT LTG    Bed Mobility PT LTG, Date Established 01/03/17  -DM 01/03/17  -DM     Bed Mobility PT LTG, Time to Achieve 1 wk  -DM 1 wk  -DM     Bed Mobility PT LTG, Activity Type all bed mobility  -DM all bed mobility  -DM     Bed Mobility PT LTG, [place] Level independent  -DM independent  -DM     Bed Mobility PT LTG, Outcome goal ongoing  -DM      Transfer Training PT LTG    Transfer Training PT LTG, Date Established 01/03/17  -DM 01/03/17  -DM     Transfer Training PT LTG, Time to Achieve 1 wk  -DM 1 wk  -DM     Transfer Training PT LTG, Activity Type sit to stand/stand to sit;bed to chair /chair to bed;toilet  -DM bed to chair /chair to bed;sit to stand/stand to sit;toilet  -DM     Transfer Training PT LTG, [place] Level supervision required  -DM supervision required  -DM     Transfer Training PT LTG, Assist Device walker, rolling  -DM walker, rolling  -DM     Transfer Training PT LTG, Outcome goal ongoing  -DM      Gait Training PT LTG    Gait Training Goal PT LTG, Date Established 01/03/17  -DM 01/03/17  -DM     Gait  Training Goal PT LTG, Time to Achieve 1 wk  -DM 1 wk  -DM     Gait Training Goal PT LTG, [place] Level contact guard assist  -DM contact guard assist  -DM     Gait Training Goal PT LTG, Assist Device walker, rolling  -DM walker, rolling  -DM     Gait Training Goal PT LTG, Distance to Achieve 30  -DM 30   w/ stable VS  -DM     Gait Training Goal PT LTG, Outcome goal ongoing  -DM        User Key  (r) = Recorded By, (t) = Taken By, (c) = Cosigned By    Initials Name Provider Type    NICHOLAS Bennett, PT Physical Therapist          Physical Therapy Education     Title: PT OT SLP Therapies (Active)     Topic: Physical Therapy (Active)     Point: Mobility training (Active)    Learning Progress Summary    Learner Readiness Method Response Comment Documented by Status   Patient Eager E,D NR  DM 01/04/17 1042 Active    Eager E,D NR  DM 01/03/17 1634 Active    Eager E VU  AP 01/03/17 0056 Done   Family Eager E,D NR  DM 01/03/17 1634 Active               Point: Home exercise program (Active)    Learning Progress Summary    Learner Readiness Method Response Comment Documented by Status   Patient Eager E,D NR  DM 01/04/17 1042 Active    Eager E,D NR  DM 01/03/17 1634 Active    Eager E VU  AP 01/03/17 0056 Done   Family Eager E,D NR  DM 01/03/17 1634 Active               Point: Body mechanics (Active)    Learning Progress Summary    Learner Readiness Method Response Comment Documented by Status   Patient Eager E,D NR  DM 01/04/17 1042 Active    Eager E,D NR  DM 01/03/17 1634 Active    Eager E VU  AP 01/03/17 0056 Done   Family Eager E,D NR  DM 01/03/17 1634 Active               Point: Precautions (Active)    Learning Progress Summary    Learner Readiness Method Response Comment Documented by Status   Patient Eager E,D NR  DM 01/04/17 1042 Active    Eager E,D NR  DM 01/03/17 1634 Active    Eager E VU  AP 01/03/17 0056 Done   Family Eager E,D NR  DM 01/03/17 1634 Active                      User Key     Initials Effective  Dates Name Provider Type Discipline    DM 06/19/15 -  Kiera Bennett, PT Physical Therapist PT    AP 06/16/16 -  Robina Kimball, RN Registered Nurse Nurse                    PT Recommendation and Plan  Anticipated Discharge Disposition: skilled nursing facility  PT Frequency: daily  Plan of Care Review  Plan Of Care Reviewed With: patient  Progress: progress towards functional goals is fair  Outcome Summary/Follow up Plan: pt. remains in A fib & had signif decr. BP R UE w/ gt in room (incr. to baseline L UE s/p 5 min rest); remains impatient/ impulsive (poor compliance w/ safety precaut; dumps H20  when staff not meeting her satisfaction,& calls out for floor cleaning mult x's); will cont to mobilize on port o2 while monitoring VS closely           Outcome Measures       01/04/17 0915 01/03/17 1430 01/03/17 1424    How much help from another person do you currently need...    Turning from your back to your side while in flat bed without using bedrails? 3  -DM 3  -DM     Moving from lying on back to sitting on the side of a flat bed without bedrails? 3  -DM 3  -DM     Moving to and from a bed to a chair (including a wheelchair)? 3  -DM 3  -DM     Standing up from a chair using your arms (e.g., wheelchair, bedside chair)? 2  -DM 3  -DM     Climbing 3-5 steps with a railing? 1  -DM 1  -DM     To walk in hospital room? 3  -DM 3  -DM     AM-PAC 6 Clicks Score 15  -DM 16  -DM     How much help from another is currently needed...    Putting on and taking off regular lower body clothing?   2  -AR    Bathing (including washing, rinsing, and drying)   2  -AR    Toileting (which includes using toilet bed pan or urinal)   1  -AR    Putting on and taking off regular upper body clothing   3  -AR    Taking care of personal grooming (such as brushing teeth)   3  -AR    Eating meals   4  -AR    Score   15  -AR    Functional Assessment    Outcome Measure Options AM-PAC 6 Clicks Basic Mobility (PT)  -DM AM-PAC 6 Clicks Basic  Mobility (PT)  -DM AM-PAC 6 Clicks Daily Activity (OT)  -AR      User Key  (r) = Recorded By, (t) = Taken By, (c) = Cosigned By    Initials Name Provider Type    DM Kiera Bennett, PT Physical Therapist    AR Cristy Garcia, OT Occupational Therapist           Time Calculation:         PT Charges       01/04/17 1048          Time Calculation    Start Time 0915  -DM      PT Received On 01/04/17  -DM      PT Goal Re-Cert Due Date 01/03/17  -DM      Time Calculation- PT    Total Timed Code Minutes- PT 49 minute(s)  -DM        User Key  (r) = Recorded By, (t) = Taken By, (c) = Cosigned By    Initials Name Provider Type    NICHOLAS Bennett, PT Physical Therapist          Therapy Charges for Today     Code Description Service Date Service Provider Modifiers Qty    11367111584 HC PT EVAL MOD COMPLEXITY 4 1/3/2017 Kiera Bennett, PT GP 1    94965393501 HC PT THER PROC EA 15 MIN 1/3/2017 Kiera Bennett, PT GP 2    42277627090 HC GAIT TRAINING EA 15 MIN 1/3/2017 Kiera Bennett, PT GP 1    52451938938 HC PT THER PROC EA 15 MIN 1/4/2017 Kiera Bennett, PT GP 2    04946360836 HC GAIT TRAINING EA 15 MIN 1/4/2017 Kiera Bennett, PT GP 1          PT G-Codes  Outcome Measure Options: AM-PAC 6 Clicks Basic Mobility (PT)    Kiera Bennett, PT  1/4/2017

## 2017-01-04 NOTE — PLAN OF CARE
Problem: Fall Risk (Adult)  Goal: Identify Related Risk Factors and Signs and Symptoms  Outcome: Ongoing (interventions implemented as appropriate)  Goal: Absence of Falls  Outcome: Ongoing (interventions implemented as appropriate)    Problem: Skin Integrity Impairment, Risk/Actual (Adult)  Goal: Identify Related Risk Factors and Signs and Symptoms  Outcome: Ongoing (interventions implemented as appropriate)  Goal: Skin Integrity/Wound Healing  Outcome: Ongoing (interventions implemented as appropriate)

## 2017-01-04 NOTE — PLAN OF CARE
Problem: Patient Care Overview (Adult)  Goal: Plan of Care Review  Outcome: Ongoing (interventions implemented as appropriate)    01/04/17 1042   Coping/Psychosocial Response Interventions   Plan Of Care Reviewed With patient   Patient Care Overview   Progress progress towards functional goals is fair   Outcome Evaluation   Outcome Summary/Follow up Plan pt. remains in A fib & had signif decr. BP R UE w/ gt in room (incr. to baseline L UE s/p 5 min rest); remains impatient/ impulsive (poor compliance w/ safety precaut; dumps H20 when staff not meeting her satisfaction,& calls out for floor cleaning mult x's); will cont to mobilize on port o2 while monitoring VS closely          Problem: Inpatient Physical Therapy  Goal: Bed Mobility Goal LTG- PT  Outcome: Ongoing (interventions implemented as appropriate)    01/04/17 1042   Bed Mobility PT LTG   Bed Mobility PT LTG, Date Established 01/03/17   Bed Mobility PT LTG, Time to Achieve 1 wk   Bed Mobility PT LTG, Activity Type all bed mobility   Bed Mobility PT LTG, [place] Level independent   Bed Mobility PT LTG, Outcome goal ongoing       Goal: Transfer Training Goal 1 LTG- PT  Outcome: Ongoing (interventions implemented as appropriate)    01/04/17 1042   Transfer Training PT LTG   Transfer Training PT LTG, Date Established 01/03/17   Transfer Training PT LTG, Time to Achieve 1 wk   Transfer Training PT LTG, Activity Type sit to stand/stand to sit;bed to chair /chair to bed;toilet   Transfer Training PT LTG, [place] Level supervision required   Transfer Training PT LTG, Assist Device walker, rolling   Transfer Training PT LTG, Outcome goal ongoing       Goal: Gait Training Goal LTG- PT  Outcome: Ongoing (interventions implemented as appropriate)    01/04/17 1042   Gait Training PT LTG   Gait Training Goal PT LTG, Date Established 01/03/17   Gait Training Goal PT LTG, Time to Achieve 1 wk   Gait Training Goal PT LTG, [place] Level contact guard assist    Gait Training Goal PT LTG, Assist Device walker, rolling   Gait Training Goal PT LTG, Distance to Achieve 30   Gait Training Goal PT LTG, Outcome goal ongoing

## 2017-01-04 NOTE — PROGRESS NOTES
Avril Allen  [id number]  9/8/1921   LOS: 2 days   Patient Care Team:  Radha Pal MD as PCP - General (Internal Medicine)    Chief Complaint:  SOB / WEAKNESS    Subjective      Overall, good spirits with coarse wet nonproductive cough.  She denies anginal type chest discomfort, pleurisy, hemoptysis, nausea, emesis, abdominal pain, or headache.  No current complaints of focal motor-sensory changes.  Comfortable on supplemental oxygen with oximetry reading 96%.    Objective     Vital Sign Min/Max for last 24 hours  Temp  Min: 97.7 °F (36.5 °C)  Max: 97.8 °F (36.6 °C)   BP  Min: 105/69  Max: 123/82   Pulse  Min: 81  Max: 94   Resp  Min: 18  Max: 20   SpO2  Min: 91 %  Max: 97 %   Flow (L/min)  Min: 2  Max: 2   Weight  Min: 113 lb 11.2 oz (51.6 kg)  Max: 113 lb 11.2 oz (51.6 kg)     Last 2 weights    01/03/17  0442 01/04/17  0452   Weight: 112 lb 1.6 oz (50.8 kg) 113 lb 11.2 oz (51.6 kg)         Intake/Output Summary (Last 24 hours) at 01/04/17 0713  Last data filed at 01/03/17 1546   Gross per 24 hour   Intake    340 ml   Output      0 ml   Net    340 ml       Physical Exam:     General Appearance:    Alert, cooperative, in no acute distress   Lungs:     Diffuse rhonchi and wheezes with bibasilar crackles; respirations regular, even and unlabored    Heart:    Irregular and normal rate, normal S1 and S2, grade 2/6            murmur, no gallop, no rub, no click   Abdomen:  Extremities:   Soft, non-tender        1+ edema             Pulses:   Pulses palpable and equal bilaterally      Results Review:     Results from last 7 days  Lab Units 01/04/17  0513 01/03/17  0447 01/02/17  1658   SODIUM mmol/L 138 142 141   POTASSIUM mmol/L 3.2* 3.6 4.5   CHLORIDE mmol/L 99 103 102   TOTAL CO2 mmol/L 30.0 27.0 29.0   BUN mg/dL 31* 29* 30*   CREATININE mg/dL 1.20 1.10 1.10   GLUCOSE mg/dL 89 97 102*   CALCIUM mg/dL 8.8 9.1 9.2       Results from last 7 days  Lab Units 01/02/17  1658   WBC 10*3/mm3 5.73   HEMOGLOBIN g/dL 8.8*    HEMATOCRIT % 27.8*   PLATELETS 10*3/mm3 172           NO CXR / EKG.        Medication Review: REVIEWED    Assessment/Plan      Patient needs evaluation and treatment of anemia which is contributing to congestive heart failure.  Will need to consider IV Bumex 1 mg twice a day.  Would consider altering losartan to valsartan 40 mg twice a day.  Options remain very limited in view of her multiple medical problems and advanced age; specifically, she is not felt to be a candidate for surgical correction of her valvular heart disease.  Would continue attempts at empiric treatment including diuresis, correction of anemia, and treatment of probable associated bronchitis/bronchopneumonia with bronchospasm.  I'm pleased that overall she is comfortable today and remains neurologically intact.      Principal Problem:    Acute diastolic (congestive) heart failure  Active Problems:    Hypertension    Atrial fibrillation, no anticoags due to hx GIB and fall risk    Pacemaker at end of battery life    Pulmonary hypertension    Acute hypoxic respiratory failure    Healthcare Associated Pneumonia    Valvular heart disease        01/04/17  7:13 AM

## 2017-01-05 NOTE — PROGRESS NOTES
"    [facility] Medicine Services  INPATIENT PROGRESS NOTE    Date of Admission: 1/2/2017  Length of Stay: 3  Primary Care Physician: Radha Pal MD    Subjective     Chief Complaint: bilateral infiltrates  HPI:    Yelling out in her room \"Bring me some clothes\", when I enter she is awake and alert, mildly confused. Follows commands and answers questions denies pain or discomfor     Review Of Systems:   Review of Systems   Reason unable to perform ROS: patient confusion.         Objective      Temp:  [97.9 °F (36.6 °C)-98.3 °F (36.8 °C)] 97.9 °F (36.6 °C)  Heart Rate:  [88-90] 89  Resp:  [18-20] 18  BP: (100-129)/(70-80) 110/70  Physical Exam    Frail  Confused, but will follow commands and briefly answer questions, Alert   irreg irrg  bilat rhonchi but diminished bilat as well  abd soft, non tender non distended  Trace lower ext edema  Results Review:    I have reviewed the labs, radiology results and diagnostic studies.      Results from last 7 days  Lab Units 01/02/17  1658   WBC 10*3/mm3 5.73   HEMOGLOBIN g/dL 8.8*   PLATELETS 10*3/mm3 172       Results from last 7 days  Lab Units 01/05/17  0622   SODIUM mmol/L 138   POTASSIUM mmol/L 3.7   TOTAL CO2 mmol/L 31.0   CREATININE mg/dL 1.00   GLUCOSE mg/dL 100       Culture Data:   BLOOD CULTURE   Date Value Ref Range Status   01/02/2017 No growth at 2 days  Preliminary   01/02/2017 No growth at 2 days  Preliminary     Radiology Data:     I have reviewed the medications.    aspirin 325 mg Oral Daily   brimonidine 1 drop Right Eye BID   bumetanide 1 mg Intravenous Daily   carvedilol 6.25 mg Oral BID With Meals   cetirizine 10 mg Oral Daily   doxycycline 100 mg Oral Q12H   DULoxetine 30 mg Oral Daily   FLUoxetine 40 mg Oral Daily   fluticasone 2 spray Nasal Daily   gabapentin 100 mg Oral Nightly   heparin (porcine) 5,000 Units Subcutaneous Q12H   pantoprazole 40 mg Oral Q AM   piperacillin-tazobactam 3.375 g Intravenous Q6H   valsartan 40 mg " Oral Q12H       Assessment/Plan     Assessment/Problem List  Principal Problem:    Acute diastolic (congestive) heart failure  Active Problems:    Hypertension    Atrial fibrillation, no anticoags due to hx GIB and fall risk    Pacemaker at end of battery life    Pulmonary hypertension    Acute hypoxic respiratory failure    Healthcare Associated Pneumonia    Valvular heart disease           Plan  Acute on Chronic Resp failure  -multifactorial  - CHF/PNA     B/L infitrates on cxr - may represent both CHF and pneumonia. Continue empric abx, cxr reviewed.     CHF  --cards following  --strict I/O  --continue coreg, arb, bumex given IV Daily. Change to oral likely tomorrow    Anemia    - not sure if patient is good endoscopy candidate, but we could certainly try iron infusion/replacement if needed  - check Hg today     Palliative consult noted, appears appropriate.    PPM at end of battery life    Valvular Heart Dz  - not a surgical candidate      DVT prophylaxis:  Discharge Planning: I expect patient to be discharged to SNF in 2-3 days.  Pt is from [facility], doubtful she can return there will likely need a higher level of care at [place].    Fabi Mccoy,    01/05/17   11:09 AM    Please note that portions of this note may have been completed with a voice recognition program. Efforts were made to edit the dictations, but occasionally words are mistranscribed.

## 2017-01-05 NOTE — PLAN OF CARE
Problem: Patient Care Overview (Adult)  Goal: Plan of Care Review  Outcome: Ongoing (interventions implemented as appropriate)    01/05/17 1435   Coping/Psychosocial Response Interventions   Plan Of Care Reviewed With patient   Patient Care Overview   Progress progress toward functional goals as expected   Outcome Evaluation   Outcome Summary/Follow up Plan Pt progressing with BUE HEP/strengthening and adaptive breathing technique this date; will seth nue to benefit from skilled OT services to address deficits, facilitate increaased fxl I, safe transtiion to next level of care.         Problem: Inpatient Occupational Therapy  Goal: Transfer Training Goal 1 LTG- OT  Outcome: Ongoing (interventions implemented as appropriate)    01/03/17 1608 01/05/17 1435   Transfer Training OT LTG   Transfer Training OT LTG, Date Established 01/03/17 --    Transfer Training OT LTG, Time to Achieve 1 wk --    Transfer Training OT LTG, Activity Type sit to stand/stand to sit;toilet --    Transfer Training OT LTG, [place] Level verbal cues required;contact guard assist --    Transfer Training OT LTG, Assist Device commode, bedside;walker, rolling --    Transfer Training OT LTG, Outcome --  goal ongoing  (Pt declined fxl mobility this date d/t just having PT)       Goal: Patient Education Goal LTG- OT  Outcome: Ongoing (interventions implemented as appropriate)    01/03/17 1608 01/05/17 1435   Patient Education OT LTG   Patient Education OT LTG, Date Established 01/03/17 --    Patient Education OT LTG, Time to Achieve 1 wk --    Patient Education OT LTG, Education Type HEP;home safety;energy conservation;work simplification;adaptive breathing --    Patient Education OT LTG, Education Understanding demonstrates adequately;verbalizes understanding --    Patient Education OT LTG Outcome --  goal ongoing  (Progressing with HEP, adaptive breathing this date.)       Goal: LB Dressing Goal LTG- OT  Outcome: Ongoing (interventions  implemented as appropriate)    01/03/17 1608 01/05/17 1435   LB Dressing OT LTG   LB Dressing Goal OT LTG, Date Established 01/03/17 --    LB Dressing Goal OT LTG, Time to Achieve by discharge --    LB Dressing Goal OT LTG, Activity Type Pt will complete LB dressing task  --    LB Dressing Goal OT LTG, [place] Level verbal cues required;moderate assist (50% patient effort) --    LB Dressing Goal OT LTG, Adaptive Equipment (AE PRN) --    LB Dressing Goal OT LTG, Outcome --  goal ongoing       Goal: UB Dressing Goal LTG- OT  Outcome: Ongoing (interventions implemented as appropriate)    01/03/17 1608 01/05/17 1435   UB Dressing OT LTG   UB Dressing Goal OT LTG, Date Established 01/03/17 --    UB Dressing Goal OT LTG, Time to Achieve by discharge --    UB Dressing Goal OT LTG, Activity Type Pt will complete UB dressing task  --    UB Dressing Goal OT LTG, [place] Level verbal cues required;supervision required --    UB Dressing Goal OT LTG, Outcome --  goal ongoing

## 2017-01-05 NOTE — PROGRESS NOTES
"Acute Care - Occupational Therapy Treatment Note  [facility]     Patient Name: Avril Allen  : 1921  MRN: [id number]  Today's Date: 2017  Onset of Illness/Injury or Date of Surgery Date: 17  Date of Referral to OT: 17  Referring Physician: Jackelin Medley MD      Admit Date: 2017    Visit Dx:     ICD-10-CM ICD-9-CM   1. Acute diastolic (congestive) heart failure I50.31 428.31     428.0   2. Bronchospasm J98.01 519.11   3. Acute respiratory failure with hypoxia J96.01 518.81   4. Community acquired pneumonia J18.9 486   5. Impaired mobility and ADLs Z74.09 799.89   6. Impaired functional mobility, balance, gait, and endurance Z74.09 V49.89   7. Chronic atrial fibrillation I48.2 427.31   8. Essential hypertension I10 401.9     Patient Active Problem List   Diagnosis   • Hypertension   • Arthritis   • Altered mental status   • Transient cerebral ischemia   • Atrial fibrillation, no anticoags due to hx GIB and fall risk   • Pacemaker at end of battery life   • Pacemaker-dependent due to native cardiac rhythm insufficient to support life   • Failure to thrive in adult   • Acute blood loss anemia   • GI bleed   • Acute diastolic (congestive) heart failure   • Pulmonary hypertension   • Acute hypoxic respiratory failure   • Healthcare Associated Pneumonia   • Valvular heart disease             Adult Rehabilitation Note       17 1409 17 1320 17 0915    Rehab Assessment/Intervention    Discipline occupational therapist  -TA physical therapist  -MB physical therapist  -DM    Document Type therapy note (daily note)  -TA therapy note (daily note)  -MB therapy note (daily note)  -DM    Subjective Information agree to therapy;complains of;fatigue  -TA agree to therapy;complains of;fatigue  -MB agree to therapy;complains of;weakness;dyspnea   incontBMx2(diarrhea);nsg pagedMD,gave meds;\"I'll sit up>4 hr  -DM    Patient Effort, Rehab Treatment adequate  -TA good  -MB good  -DM    Symptoms " Noted During/After Treatment  none  -MB fatigue;shortness of breath;significant change in vital signs   Signif decr.BP RUE mid-gt.(resting on BSC);elev s/p5min rest  -DM    Precautions/Limitations fall precautions  -TA fall precautions;oxygen therapy device and L/min  -MB oxygen therapy device and L/min;fall precautions   very impulsive;dumped H20 in bed whenPCT didn't refill cup  -DM    Recorded by [TA] Fito Ryan, OT [MB] Joann Mariano, PT [DM] Kiera Bennett, PT    Vital Signs    Pre Systolic BP Rehab   105   LUE  -DM    Pre Treatment Diastolic BP   69  -DM    Intra Systolic BP Rehab   78   78/46 RUE;5 min later in LUE:102/70  -DM    Intra Treatment Diastolic BP   46  -DM    Post Systolic BP Rehab   100   LUE  -DM    Post Treatment Diastolic BP   72  -DM    Pretreatment Heart Rate (beats/min)   93  -DM    Intratreatment Heart Rate (beats/min)   98  -DM    Posttreatment Heart Rate (beats/min)   93  -DM    Pre SpO2 (%) 98  -TA  99  -DM    O2 Delivery Pre Treatment supplemental O2   2L  -TA  supplemental O2   2 L  -DM    Intra SpO2 (%) 78  -TA  91  -DM    O2 Delivery Intra Treatment supplemental O2   2L  -TA  supplemental O2  -DM    Post SpO2 (%) 97  -TA  994  -DM    O2 Delivery Post Treatment supplemental O2   2L  -TA  supplemental O2  -DM    Pre Patient Position Sitting  -TA  Sitting  -DM    Intra Patient Position Sitting  -TA  Sitting  -DM    Post Patient Position Sitting  -TA  Sitting  -DM    Recorded by [TA] Fito Ryan OT  [DM] Kiera Bennett, PT    Pain Assessment    Pain Assessment 0-10  -TA No/denies pain  -MB No/denies pain  -DM    Pain Score 0  -TA      Post Pain Score 0  -TA      Pain Intervention(s) Ambulation/increased activity  -TA      Recorded by [TA] Fito Ryan OT [MB] Joann Mariano, PT [DM] Kiera Bennett, PT    Vision Assessment/Intervention    Visual Impairment   WFL with corrective lenses  -DM    Recorded by   [DM] Kiera Bennett, PT    Cognitive  Assessment/Intervention    Current Cognitive/Communication Assessment impaired  -TA impaired  -MB impaired  -DM    Orientation Status oriented to;person;place;situation   some confusion regarding timeline of events today per DTR.  -TA oriented to;person;place  -MB oriented to;person;place;situation  -DM    Follows Commands/Answers Questions 100% of the time;able to follow single-step instructions;needs cueing;needs increased time;needs repetition  -TA 75% of the time;able to follow single-step instructions;needs cueing  -% of the time;able to follow single-step instructions;needs cueing;needs increased time;needs repetition  -DM    Personal Safety mild impairment;decreased awareness, need for assist;decreased awareness, need for safety;decreased insight to deficits  -TA mild impairment;decreased awareness, need for assist;decreased awareness, need for safety;decreased insight to deficits  -MB mild impairment;impulsive;decreased awareness, need for assist;decreased awareness, need for safety;decreased insight to deficits  -DM    Personal Safety Interventions fall prevention program maintained;gait belt;nonskid shoes/slippers when out of bed;supervised activity  -TA fall prevention program maintained;gait belt;nonskid shoes/slippers when out of bed;muscle strengthening facilitated  -MB elopement precautions initiated;fall prevention program maintained;gait belt;nonskid shoes/slippers when out of bed  -DM    Recorded by [TA] Fito Ryan, OT [MB] Joann Mariano, PT [DM] Kiera Bennett, PT    Bed Mobility, Assessment/Treatment    Bed Mobility, Roll Left, [place]   other (see comments)   UIC  -DM    Bed Mob, Supine to Sit, [place]  not tested   Pt. UIC.  -MB     Bed Mobility, Comment Pt UIC  -TA      Recorded by [TA] Fito Ryan, OT [MB] Joann Mariano, PT [DM] Kiera Bennett, PT    Transfer Assessment/Treatment    Transfers, Sit-Stand [place]  minimum assist (75% patient  "effort);verbal cues required  -MB moderate assist (50% patient effort);verbal cues required   3 stand.trials;had BM;PCT helped change depends/pads  -DM    Transfers, Stand-Sit [place]  minimum assist (75% patient effort);verbal cues required  -MB verbal cues required;minimum assist (75% patient effort)  -DM    Transfers, Sit-Stand-Sit, Assist Device  rolling walker  -MB rolling walker  -DM    Transfer, Safety Issues  step length decreased;weight-shifting ability decreased;balance decreased during turns;loses balance backward;impulsivity  -MB knees buckling;weight-shifting ability decreased;loses balance backward  -DM    Transfer, Impairments  strength decreased;impaired balance  -MB strength decreased;impaired balance;postural control impaired  -DM    Transfer, Comment Pt declined d/t having just worked with PT  -TA VCs for safe hand placement, to push from armrest to stand and reach back prior to t/f.  During standing balance activities, patient stated, \"I'm gonna sit\" and began to sit abruptly before positioning in front of chair, requiring assist to lower safely to sitting.   -MB FREQ reminders for safety/hand placement & shifting wt over C of G  -DM    Recorded by [TA] Fito Ryan, OT [MB] Joann Mariano, PT [DM] Kiera Bennett, PT    Gait Assessment/Treatment    Gait, [place] Level  minimum assist (75% patient effort);verbal cues required   chair in tow  -MB minimum assist (75% patient effort);verbal cues required  -DM    Gait, Assistive Device  rolling walker  -MB rolling walker  -DM    Gait, Distance (Feet)  10   Pt. declined further gait.  -MB 20   10 min rest midway;nsg assessed;BP 78/46 (R);retook LUE  -DM    Gait, Gait Pattern Analysis  swing-to gait  -MB swing-to gait  -DM    Gait, Gait Deviations  ranjit decreased;forward flexed posture;bilateral:;step length decreased  -MB ranjit decreased;decreased heel strike;knee buckling;forward flexed posture;narrow base;step length " decreased;toe-to-floor clearance decreased  -DM    Gait, Safety Issues  balance decreased during turns;step length decreased;weight-shifting ability decreased;supplemental O2  -MB step length decreased;loses balance backward;supplemental O2  -DM    Gait, Impairments  strength decreased;impaired balance  -MB strength decreased;impaired balance;postural control impaired  -DM    Gait, Comment  Pt. amb slowly w/ VCs for upright posture and inc B step length.  Pt. amb 10 ft, then requested to sit, declining further gait.  -MB pt wants her tripod R wx from NH  -DM    Recorded by  [MB] Joann Mariano, PT [DM] Kiera Bennett, PT    Motor Skills/Interventions    Additional Documentation   Balance Skills Training (Group)  -DM    Recorded by   [DM] Kiera Bennett, PT    Balance Skills Training    Sitting-Level of Assistance   Close supervision  -DM    Sitting-Balance Support   Feet supported  -DM    Sitting-Balance Activities   Trunk control activities  -DM    Sitting # of Minutes   10  -DM    Standing-Level of Assistance  Minimum assistance  -MB Moderate assistance   INCR. trunk flex  -DM    Static Standing Balance Support  assistive device  -MB assistive device  -DM    Standing-Balance Activities  Weight Shift R-L;Weight Shift A-P  -MB Weight Shift A-P;Weight Shift R-L  -DM    Standing Balance # of Minutes  3  -MB 6  -DM    Recorded by  [MB] Joann Mariano, PT [DM] Kiera Bennett, PT    Therapy Exercises    Bilateral Lower Extremities  AROM:;10 reps;sitting;ankle pumps/circles;hip flexion;hip abduction/adduction;LAQ  -MB AROM:;10 reps;sitting;ankle pumps/circles;heel slides;hip abduction/adduction;hip ER;hip flexion;hip IR;LAQ;quad sets  -DM    Bilateral Upper Extremity AROM:;20 reps;sitting;hand pumps;elbow flexion/extension;shoulder circles;shoulder horizontal abd/add  -TA      BUE Resistance manual resistance- minimal;other (comment)   with elbow, shld ext and shld horiz add.  -TA      Recorded by [TA] Fito COOPER  Shel[place], OT [MB] Joann Mariano, PT [DM] Kiera Bennett, PT    Positioning and Restraints    Pre-Treatment Position sitting in chair/recliner  -TA sitting in chair/recliner  -MB sitting in chair/recliner  -DM    Post Treatment Position chair  -TA chair  -MB chair  -DM    In Chair reclined;call light within reach;encouraged to call for assist;exit alarm on;with family/caregiver;legs elevated   all lines intact  -TA notified nsg;reclined;call light within reach;encouraged to call for assist;exit alarm on;with family/caregiver;legs elevated  -MB sitting;call light within reach;exit alarm on;with nsg;RUE elevated;LUE elevated;waffle cushion;legs elevated  -DM    Recorded by [TA] Fito Ryan, OT [MB] Joann Mariano, PT [DM] Kiera Bennett, PT      User Key  (r) = Recorded By, (t) = Taken By, (c) = Cosigned By    Initials Name Effective Dates    DM Kiera Bennett, PT 06/19/15 -     TA Fito Ryan, OT 03/14/16 -     GEORGES Mariano, PT 03/14/16 -                 OT Goals       01/05/17 1435 01/03/17 1608       Transfer Training OT LTG    Transfer Training OT LTG, Date Established  01/03/17  -AR     Transfer Training OT LTG, Time to Achieve  1 wk  -AR     Transfer Training OT LTG, Activity Type  sit to stand/stand to sit;toilet  -AR     Transfer Training OT LTG, [place] Level  verbal cues required;contact guard assist  -AR     Transfer Training OT LTG, Assist Device  commode, bedside;walker, rolling  -AR     Transfer Training OT LTG, Outcome goal ongoing   Pt declined fxl mobility this date d/t just having PT  -TA      Patient Education OT LTG    Patient Education OT LTG, Date Established  01/03/17  -AR     Patient Education OT LTG, Time to Achieve  1 wk  -AR     Patient Education OT LTG, Education Type  HEP;home safety;energy conservation;work simplification;adaptive breathing  -AR     Patient Education OT LTG, Education Understanding  demonstrates adequately;verbalizes understanding   -AR     Patient Education OT LTG Outcome goal ongoing   Progressing with HEP, adaptive breathing this date.  -TA      LB Dressing OT LTG    LB Dressing Goal OT LTG, Date Established  01/03/17  -AR     LB Dressing Goal OT LTG, Time to Achieve  by discharge  -AR     LB Dressing Goal OT LTG, Activity Type  Pt will complete LB dressing task   -AR     LB Dressing Goal OT LTG, [place] Level  verbal cues required;moderate assist (50% patient effort)  -AR     LB Dressing Goal OT LTG, Adaptive Equipment  --   AE PRN  -AR     LB Dressing Goal OT LTG, Outcome goal ongoing  -TA      UB Dressing OT LTG    UB Dressing Goal OT LTG, Date Established  01/03/17  -AR     UB Dressing Goal OT LTG, Time to Achieve  by discharge  -AR     UB Dressing Goal OT LTG, Activity Type  Pt will complete UB dressing task   -AR     UB Dressing Goal OT LTG, [place] Level  verbal cues required;supervision required  -AR     UB Dressing Goal OT LTG, Outcome goal ongoing  -TA        User Key  (r) = Recorded By, (t) = Taken By, (c) = Cosigned By    Initials Name Provider Type    AR Cristy Garcia, OT Occupational Therapist    RORY Ryan, OT Occupational Therapist          Occupational Therapy Education     Title: PT OT SLP Therapies (Active)     Topic: Occupational Therapy (Active)     Point: ADL training (Active)    Description: Instruct learner(s) on proper safety adaptation and remediation techniques during self care or transfers.   Instruct in proper use of assistive devices.    Learning Progress Summary    Learner Readiness Method Response Comment Documented by Status   Patient BERTHA Cantrell NR  DM 01/03/17 1634 Active    ELIOT Cantrell D VU,NR Education provided on ADL retraining, transfer traiing, bed mobility, goals of care AR 01/03/17 1607 Done   Family BERTHA Cantrell NR  DM 01/03/17 1634 Active    ELIOT Cantrell D VU,NR Education provided on ADL retraining, transfer traiing, bed mobility, goals of care AR 01/03/17 1607 Done                Point: Home exercise program (Done)    Description: Instruct learner(s) on appropriate technique for monitoring, assisting and/or progressing therapeutic exercises/activities.    Learning Progress Summary    Learner Readiness Method Response Comment Documented by Status   Patient Acceptance E,TB,D VU,NR BUE HEP, adaptive breathing technique to assist with mgt of SOA. TA 01/05/17 1434 Done    Eager E,D NR  DM 01/03/17 1634 Active    Eager E,TB,D VU,NR Education provided on ADL retraining, transfer traiing, bed mobility, goals of care AR 01/03/17 1607 Done   Family Acceptance E,TB,D VU,NR BUE HEP, adaptive breathing technique to assist with mgt of SOA. TA 01/05/17 1434 Done    Eager E,D NR  DM 01/03/17 1634 Active    Eager E,TB,D VU,NR Education provided on ADL retraining, transfer traiing, bed mobility, goals of care AR 01/03/17 1607 Done               Point: Precautions (Active)    Description: Instruct learner(s) on prescribed precautions during self-care and functional transfers.    Learning Progress Summary    Learner Readiness Method Response Comment Documented by Status   Patient Eager E,D NR  DM 01/03/17 1634 Active    Eager E,TB,D VU,NR Education provided on ADL retraining, transfer traiing, bed mobility, goals of care AR 01/03/17 1607 Done   Family Eager E,D NR  DM 01/03/17 1634 Active    Eager E,TB,D VU,NR Education provided on ADL retraining, transfer traiing, bed mobility, goals of care AR 01/03/17 1607 Done               Point: Body mechanics (Active)    Description: Instruct learner(s) on proper positioning and spine alignment during self-care, functional mobility activities and/or exercises.    Learning Progress Summary    Learner Readiness Method Response Comment Documented by Status   Patient Eager E,D NR  DM 01/03/17 1634 Active    Eager E,TB,D VU,NR Education provided on ADL retraining, transfer traiing, bed mobility, goals of care AR 01/03/17 1607 Done   Family Eager E,D NR   01/03/17 1634  Active    Emmanuel E,TB,D VU,NR Education provided on ADL retraining, transfer traiing, bed mobility, goals of care AR 01/03/17 1607 Done                      User Key     Initials Effective Dates Name Provider Type Discipline    DM 06/19/15 -  Kiera Bennett, PT Physical Therapist PT    AR 06/22/15 -  Cristy Gracia, OT Occupational Therapist OT    TA 03/14/16 -  Fito Ryan, OT Occupational Therapist OT                  OT Recommendation and Plan  Anticipated Discharge Disposition: skilled nursing facility  Therapy Frequency: daily (per priority policy)  Plan of Care Review  Plan Of Care Reviewed With: patient  Progress: progress toward functional goals as expected  Outcome Summary/Follow up Plan: Pt progressing with BUE HEP/strengthening and adaptive breathing technique this date; will seth nue to benefit from skilled OT services to address deficits, facilitate increaased fxl I, safe transtiion to next level of care.        Outcome Measures       01/05/17 1409 01/05/17 1320 01/04/17 0915    How much help from another person do you currently need...    Turning from your back to your side while in flat bed without using bedrails?  3  -MB 3  -DM    Moving from lying on back to sitting on the side of a flat bed without bedrails?  3  -MB 3  -DM    Moving to and from a bed to a chair (including a wheelchair)?  3  -MB 3  -DM    Standing up from a chair using your arms (e.g., wheelchair, bedside chair)?  2  -MB 2  -DM    Climbing 3-5 steps with a railing?  1  -MB 1  -DM    To walk in hospital room?  3  -MB 3  -DM    AM-PAC 6 Clicks Score  15  -MB 15  -DM    How much help from another is currently needed...    Putting on and taking off regular lower body clothing? 2  -TA      Bathing (including washing, rinsing, and drying) 2  -TA      Toileting (which includes using toilet bed pan or urinal) 1  -TA      Putting on and taking off regular upper body clothing 3  -TA      Taking care of personal grooming (such as  brushing teeth) 3  -TA      Eating meals 4  -TA      Score 15  -TA      Functional Assessment    Outcome Measure Options AM-PAC 6 Clicks Daily Activity (OT)  -TA AM-PAC 6 Clicks Basic Mobility (PT)  -MB AM-PAC 6 Clicks Basic Mobility (PT)  -DM      01/03/17 1430 01/03/17 1424       How much help from another person do you currently need...    Turning from your back to your side while in flat bed without using bedrails? 3  -DM      Moving from lying on back to sitting on the side of a flat bed without bedrails? 3  -DM      Moving to and from a bed to a chair (including a wheelchair)? 3  -DM      Standing up from a chair using your arms (e.g., wheelchair, bedside chair)? 3  -DM      Climbing 3-5 steps with a railing? 1  -DM      To walk in hospital room? 3  -DM      AM-PAC 6 Clicks Score 16  -DM      How much help from another is currently needed...    Putting on and taking off regular lower body clothing?  2  -AR     Bathing (including washing, rinsing, and drying)  2  -AR     Toileting (which includes using toilet bed pan or urinal)  1  -AR     Putting on and taking off regular upper body clothing  3  -AR     Taking care of personal grooming (such as brushing teeth)  3  -AR     Eating meals  4  -AR     Score  15  -AR     Functional Assessment    Outcome Measure Options AM-PAC 6 Clicks Basic Mobility (PT)  -DM AM-PAC 6 Clicks Daily Activity (OT)  -AR       User Key  (r) = Recorded By, (t) = Taken By, (c) = Cosigned By    Initials Name Provider Type    DM Kiera Bennett, PT Physical Therapist    NORA Garcia, OT Occupational Therapist    RORY Ryan, OT Occupational Therapist    GEORGES Mariano, PT Physical Therapist           Time Calculation:         Time Calculation- OT       01/05/17 1439          Time Calculation- OT    OT Start Time 1309   ttc 17 minutes  -TA      Total Timed Code Minutes- OT 17 minute(s)  -TA      OT Received On 01/05/17  -TA      OT Goal Re-Cert Due Date 01/13/17  -TA         User Key  (r) = Recorded By, (t) = Taken By, (c) = Cosigned By    Initials Name Provider Type    TA Fito Ryan OT Occupational Therapist           Therapy Charges for Today     Code Description Service Date Service Provider Modifiers Qty    [id number]  OT THERAPEUTIC ACT EA 15 MIN 1/5/2017 Fito Ryan OT GO 2               Fito Ryan OT  1/5/2017

## 2017-01-05 NOTE — PLAN OF CARE
Problem: Patient Care Overview (Adult)  Goal: Plan of Care Review  Outcome: Ongoing (interventions implemented as appropriate)    01/05/17 1210   Coping/Psychosocial Response Interventions   Plan Of Care Reviewed With patient  (intermittent confusion)   Patient Care Overview   Progress no change   Outcome Evaluation   Outcome Summary/Follow up Plan Pt reiterated need for support to her family with her illness and disease progression. No family present at time of Palliative visit. Following for ongoing GOC discussion and pt/family support.

## 2017-01-05 NOTE — PLAN OF CARE
Problem: Fall Risk (Adult)  Goal: Identify Related Risk Factors and Signs and Symptoms  Outcome: Ongoing (interventions implemented as appropriate)    01/05/17 0450   Fall Risk   Fall Risk: Related Risk Factors age-related changes;confusion/agitation;gait/mobility problems;fatigue/slow reaction;history of falls   Fall Risk: Signs and Symptoms presence of risk factors       Goal: Absence of Falls  Outcome: Ongoing (interventions implemented as appropriate)    01/05/17 1818   Fall Risk (Adult)   Absence of Falls making progress toward outcome         Problem: Cardiac: Heart Failure (Adult)  Goal: Signs and Symptoms of Listed Potential Problems Will be Absent or Manageable (Cardiac: Heart Failure)  Outcome: Ongoing (interventions implemented as appropriate)    01/05/17 0450 01/05/17 1818   Cardiac: Heart Failure   Problems Assessed (Heart Failure) --  all   Problems Present (Heart Failure) respiratory compromise;functional decline/self-care deficit;decreased quality of life --          Problem: Patient Care Overview (Adult)  Goal: Plan of Care Review  Outcome: Ongoing (interventions implemented as appropriate)    01/05/17 1818   Coping/Psychosocial Response Interventions   Plan Of Care Reviewed With patient;daughter   Patient Care Overview   Progress progress towards functional goals is fair   Outcome Evaluation   Outcome Summary/Follow up Plan Pt transferred to chair for several hours. Pt's Cdiff stool came back negative. C/o pain on bilateral knees after working with physical therapy, given PRN tylenol.        Goal: Adult Individualization and Mutuality  Outcome: Ongoing (interventions implemented as appropriate)    01/03/17 1744   Individualization   Patient Specific Preferences set up tray, pour boost into styrofoam cup   Patient Specific Goals get back to mobility baseline   Patient Specific Interventions bed alarm, waffle mattress, 2 mepilex on LLE       Goal: Discharge Needs Assessment  Outcome: Ongoing  (interventions implemented as appropriate)    01/03/17 0947 01/03/17 1430 01/03/17 1744   Discharge Needs Assessment   Concerns To Be Addressed --  --  basic needs concerns   Equipment Needed After Discharge none --  --    Discharge Disposition --  --  still a patient   Living Environment   Transportation Available --  car;family or friend will provide --    Self-Care   Equipment Currently Used at Home --  wheelchair;walker, rolling;cane, straight  (tripod R wx) --          Problem: Skin Integrity Impairment, Risk/Actual (Adult)  Goal: Identify Related Risk Factors and Signs and Symptoms  Outcome: Ongoing (interventions implemented as appropriate)  Goal: Skin Integrity/Wound Healing  Outcome: Ongoing (interventions implemented as appropriate)    01/05/17 1818   Skin Integrity Impairment, Risk/Actual (Adult)   Skin Integrity/Wound Healing making progress toward outcome

## 2017-01-05 NOTE — PROGRESS NOTES
Adult Nutrition  Assessment/PES    Patient Name:  Avril Allen  YOB: 1921  MRN: [id number]  Admit Date:  1/2/2017    Assessment Date:  1/5/2017        Reason for Assessment       01/05/17 1441    Reason for Assessment    Reason For Assessment/Visit follow up protocol    Time Spent (min) 10    Diagnosis --   per notes this admission    Hematological Anemia                  Labs/Tests/Procedures/Meds       01/05/17 1442    Labs/Tests/Procedures/Meds    Labs/Tests Review Reviewed                Nutrition Prescription Ordered       01/05/17 1442    Nutrition Prescription PO    Current PO Diet Soft Texture    Texture Whole foods    Supplement Boost Plus    Supplement Frequency 2 times a day    Common Modifiers Low Sodium;Cardiac            Evaluation of Received Nutrient/Fluid Intake       01/05/17 1443    PO Evaluation    Number of Meals 2    % PO Intake 50              Problem/Interventions:          Problem 2       01/05/17 1443    Nutrition Diagnoses Problem 2    Problem 2 Inadequate Nutrient Intake    Etiology (related to) Factors Affecting Nutrition    Appetite Good    Signs/Symptoms (evidenced by) PO Intake    Percent (%) intake recorded 50 %    Over number of meals 2                  Intervention Goal       01/05/17 1444    Intervention Goal    General Nutrition support treatment            Nutrition Intervention       01/05/17 1444    Nutrition Intervention    RD/Tech Action Supplement provided;Follow Tx progress    assisting pt. with menu selections.              Education/Evaluation       01/05/17 1447    Monitor/Evaluation    Monitor Per protocol          Electronically signed by:  Kiera Stevens RD  01/05/17 2:47 PM

## 2017-01-05 NOTE — PLAN OF CARE
Problem: Patient Care Overview (Adult)  Goal: Plan of Care Review  Outcome: Ongoing (interventions implemented as appropriate)    01/05/17 1357   Coping/Psychosocial Response Interventions   Plan Of Care Reviewed With patient;daughter   Patient Care Overview   Progress progress toward functional goals as expected   Outcome Evaluation   Outcome Summary/Follow up Plan Pt. w/ limited participation this date, requiring encouragement to participate. Pt. w/ dec safety awareness, sitting abruptly during standing balance activities. Pt. will continue to benefit from skilled IPPT to progress POC and maximize patient's safety and ind. w/ func. mob.         Problem: Inpatient Physical Therapy  Goal: Bed Mobility Goal LTG- PT  Outcome: Ongoing (interventions implemented as appropriate)    01/04/17 1042 01/05/17 1357   Bed Mobility PT LTG   Bed Mobility PT LTG, Date Established 01/03/17 --    Bed Mobility PT LTG, Time to Achieve 1 wk --    Bed Mobility PT LTG, Activity Type all bed mobility --    Bed Mobility PT LTG, Gregory Level independent --    Bed Mobility PT LTG, Outcome --  goal ongoing       Goal: Transfer Training Goal 1 LTG- PT  Outcome: Ongoing (interventions implemented as appropriate)    01/04/17 1042 01/05/17 1357   Transfer Training PT LTG   Transfer Training PT LTG, Date Established 01/03/17 --    Transfer Training PT LTG, Time to Achieve 1 wk --    Transfer Training PT LTG, Activity Type sit to stand/stand to sit;bed to chair /chair to bed;toilet --    Transfer Training PT LTG, Gregory Level supervision required --    Transfer Training PT LTG, Assist Device walker, rolling --    Transfer Training PT LTG, Outcome --  goal ongoing       Goal: Gait Training Goal LTG- PT  Outcome: Ongoing (interventions implemented as appropriate)    01/04/17 1042 01/05/17 1357   Gait Training PT LTG   Gait Training Goal PT LTG, Date Established 01/03/17 --    Gait Training Goal PT LTG, Time to Achieve 1 wk --    Gait  Training Goal PT LTG, Donley Level contact guard assist --    Gait Training Goal PT LTG, Assist Device walker, rolling --    Gait Training Goal PT LTG, Distance to Achieve 30 --    Gait Training Goal PT LTG, Outcome --  goal ongoing

## 2017-01-05 NOTE — SIGNIFICANT NOTE
Palliative Team Meeting Attendance  13:00  CURT Hearn RN, CHDO LAUREN Zuluaga RN, KALEIGH Carlson M.Div., [facility], [facility]   LAVONNE Milan, APRN

## 2017-01-05 NOTE — PROGRESS NOTES
"Avril Allen  [id number]  9/8/1921   LOS: 3 days   Patient Care Team:  Radha Pal MD as PCP - General (Internal Medicine)    Chief Complaint:  SOB / WEAKNESS    Subjective      Patient appears confused and keeps repeating \"hey--I can't get out of the bed and I don't know how I got in here.\" Denies CP, SOB, sputum production.     Objective     Vital Sign Min/Max for last 24 hours  Temp  Min: 97.9 °F (36.6 °C)  Max: 98.3 °F (36.8 °C)   BP  Min: 100/70  Max: 129/80   Pulse  Min: 88  Max: 100   Resp  Min: 18  Max: 20   SpO2  Min: 93 %  Max: 94 %   Flow (L/min)  Min: 2  Max: 2   Weight  Min: 113 lb 4.8 oz (51.4 kg)  Max: 113 lb 4.8 oz (51.4 kg)     Last 2 weights    01/04/17  0452 01/05/17  0512   Weight: 113 lb 11.2 oz (51.6 kg) 113 lb 4.8 oz (51.4 kg)         Intake/Output Summary (Last 24 hours) at 01/05/17 0745  Last data filed at 01/04/17 1614   Gross per 24 hour   Intake    100 ml   Output      0 ml   Net    100 ml       Physical Exam:     General Appearance:    Confused, in no acute distress   Lungs:     Diminished to auscultation,respirations regular, even and                   Unlabored, on 2 L O2NC    Heart:    Irregular and normal rate, normal S1 and S2, grade 2/6           murmur, no gallop, no rub, no click   Abdomen:  Extremities:   Soft, non-tender        No edema             Pulses:   Pulses palpable and equal bilaterally      Results Review:     Results from last 7 days  Lab Units 01/04/17  0513 01/03/17  0447 01/02/17  1658   SODIUM mmol/L 138 142 141   POTASSIUM mmol/L 3.2* 3.6 4.5   CHLORIDE mmol/L 99 103 102   TOTAL CO2 mmol/L 30.0 27.0 29.0   BUN mg/dL 31* 29* 30*   CREATININE mg/dL 1.20 1.10 1.10   GLUCOSE mg/dL 89 97 102*   CALCIUM mg/dL 8.8 9.1 9.2       Results from last 7 days  Lab Units 01/02/17  1658   WBC 10*3/mm3 5.73   HEMOGLOBIN g/dL 8.8*   HEMATOCRIT % 27.8*   PLATELETS 10*3/mm3 172           Chest x ray 1/5/17; Pulmonary edema versus pneumonia and bilateral pleural effusions " with    interval improvement. Stable cardiomegaly. Atherosclerotic vascular disease; REVIEWED.    NO EKG.    Medication Review: Reviewed    Assessment/Plan      Patient with anemia which is contributing to CHF; needs workup and treatment. Options remain very limited in view of her multiple medical problems and advanced age;  she is not felt to be a candidate for surgical correction of her valvular heart disease. Would continue attempts at empiric treatment including diuresis, correction of anemia, and treatment of probable associated bronchitis/bronchopneumonia with bronchospasm. In addition bumex 1 mg IV daily was initiated yesterday. Impressive infitrates on cxr 1/5/17- may represent both CHF and pneumonia. Palliative care consulted. Hypokalemic on labs yesterday and will order potassium replacement to be used if needed.  No intake or output recorded despite renal insufficiency and congestive heart failure.  Overall improving clinical course with chest x-ray improved and patient currently afebrile.  Would continue current treatment.    Principal Problem:    Acute diastolic (congestive) heart failure  Active Problems:    Hypertension    Atrial fibrillation, no anticoags due to hx GIB and fall risk    Pacemaker at end of battery life    Pulmonary hypertension    Acute hypoxic respiratory failure    Healthcare Associated Pneumonia    Valvular heart disease    Scribed for Lucas Isbell MD by SHAGUFTA Vega. 1/5/2017  7:46 AM     I, Lucas Isbell MD, [facility], personally performed the services described in this documentation as scribed by the above named individual in my presence, and it is both accurate and complete.       01/05/17  7:45 AM

## 2017-01-05 NOTE — PROGRESS NOTES
[facility] Medicine Services  INPATIENT PROGRESS NOTE    Date of Admission: 1/2/2017  Length of Stay: 2  Primary Care Physician: Radha Pal MD    Subjective     Chief Complaint: bilateral infiltrates  HPI:    Patient denies current pain      Review Of Systems:   Review of Systems   Reason unable to perform ROS: patient confusion.         Objective      Temp:  [97.8 °F (36.6 °C)-98.3 °F (36.8 °C)] 98.3 °F (36.8 °C)  Heart Rate:  [] 90  Resp:  [18-20] 18  BP: (100-129)/(69-80) 129/80  Physical Exam    Frail  Confused, but will follow commands and briefly answer questions  irreg irrg  bilat rhonchi but diminished bilat as well  abd soft, non tender non distended  1+ lower ext edema  Results Review:    I have reviewed the labs, radiology results and diagnostic studies.      Results from last 7 days  Lab Units 01/02/17  1658   WBC 10*3/mm3 5.73   HEMOGLOBIN g/dL 8.8*   PLATELETS 10*3/mm3 172       Results from last 7 days  Lab Units 01/04/17  0513   SODIUM mmol/L 138   POTASSIUM mmol/L 3.2*   TOTAL CO2 mmol/L 30.0   CREATININE mg/dL 1.20   GLUCOSE mg/dL 89       Culture Data:   BLOOD CULTURE   Date Value Ref Range Status   01/02/2017 No growth at 2 days  Preliminary   01/02/2017 No growth at 2 days  Preliminary     Radiology Data:     I have reviewed the medications.    aspirin 325 mg Oral Daily   brimonidine 1 drop Right Eye BID   bumetanide 1 mg Intravenous Daily   carvedilol 3.125 mg Oral BID With Meals   cetirizine 10 mg Oral Daily   doxycycline 100 mg Oral Q12H   DULoxetine 30 mg Oral Daily   FLUoxetine 40 mg Oral Daily   fluticasone 2 spray Nasal Daily   gabapentin 100 mg Oral Nightly   heparin (porcine) 5,000 Units Subcutaneous Q12H   losartan 100 mg Oral Daily   pantoprazole 40 mg Oral Q AM   piperacillin-tazobactam 3.375 g Intravenous Q6H       Assessment/Plan     Assessment/Problem List  Principal Problem:    Acute diastolic (congestive) heart failure  Active Problems:     Hypertension    Atrial fibrillation, no anticoags due to hx GIB and fall risk    Pacemaker at end of battery life    Pulmonary hypertension    Acute hypoxic respiratory failure    Healthcare Associated Pneumonia    Valvular heart disease           Plan    Impressive infitrates on cxr - may represent both CHF and pneumonia. Continue empric abx, cxr am    Anemia panel am - no sure if patient is good endoscopy candidate, but we could certainly try iron infusion/replacement if needed    Palliative consult noted, appears appropriate.        DVT prophylaxis:  Discharge Planning: I expect patient to be discharged to ?in ? days.    Daniel Valdez MD   01/04/17   7:00 PM    Please note that portions of this note may have been completed with a voice recognition program. Efforts were made to edit the dictations, but occasionally words are mistranscribed.

## 2017-01-05 NOTE — CONSULTS
"Radha Pal MD  Consulting physician: Amaris De Luna (APRN)  Reason for referral: Code status discussion  Chief Complaint   Patient presents with   • Shortness of Breath     HPI: 89-year-old patient who was recently hospitalized at [facility] in late November and discharged on 12/1/2016 to SNF rehabilitation. Patient has known atrial fibrillation not on anticoagulation due to history of GI bleeding as well as advanced age and fall risk, she also has known severe valvular heart disease and diastolic heart failure with pulmonary hypertension. Patient has a permanent pacemaker which is near the end of its battery life.   She is brought to the ED on 1/3 after 2-3 days of escalating dyspnea which culminated today in severe dyspnea this morning upon waking requiring supplemental nasal cannula oxygen to be placed on the patient although she does not normally wear oxygen at baseline. Patient has had increasing sinus congestion, wet cough with production of thick sputum. She also complains of approximately 2-3 days of general malaise and overall \"ill felling\". Patient also had an increase in lower extremity edema over past week, she states this is worse as the day goes on due to dependent edema while using her walker to ambulate etc.   Admitted for further management of dyspnea and peripheral edema  Symptoms:   + cough, loose however non productive.   Denies any pain, dyspnea, anxiety, or nausea. No dysphagia.   Past Medical History   Diagnosis Date   • Arthritis    • Atrial fibrillation    • Breast cancer    • Colon cancer    • Diverticulosis    • Environmental allergies    • Glaucoma    • Hypertension    • Irritable bowel    • Skin cancer      Past Surgical History   Procedure Laterality Date   • Colectomy partial / total     • Mastectomy Right    • Multiple tooth extractions       dentures   • Pacemaker implantation       Current Facility-Administered Medications   Medication Dose Route Frequency " Provider Last Rate Last Dose   • acetaminophen (TYLENOL) tablet 650 mg  650 mg Oral Q4H PRN Jackelin Medley MD       • aspirin tablet 325 mg  325 mg Oral Daily Jackelin Medley MD   325 mg at 01/04/17 0957   • brimonidine (ALPHAGAN) 0.15 % ophthalmic solution 1 drop  1 drop Right Eye BID Jackelin Medley MD   1 drop at 01/04/17 1723   • bumetanide (BUMEX) injection 1 mg  1 mg Intravenous Daily Jackelin Medley MD   1 mg at 01/04/17 0500   • carvedilol (COREG) tablet 3.125 mg  3.125 mg Oral BID With Meals Los Stevenson MD   3.125 mg at 01/04/17 1723   • cetirizine (zyrTEC) tablet 10 mg  10 mg Oral Daily Jackelin Medley MD   10 mg at 01/04/17 0958   • docusate sodium (COLACE) capsule 200 mg  200 mg Oral Daily PRN SHAGUFTA Becker       • doxycycline (VIBRAMYCIN) capsule 100 mg  100 mg Oral Q12H Los Stevenson MD   100 mg at 01/04/17 0958   • DULoxetine (CYMBALTA) DR capsule 30 mg  30 mg Oral Daily Jackelin Medley MD   30 mg at 01/04/17 1236   • FLUoxetine (PROzac) capsule 40 mg  40 mg Oral Daily Jackelin Medley MD   40 mg at 01/04/17 0957   • fluticasone (FLONASE) 50 MCG/ACT nasal spray 2 spray  2 spray Nasal Daily Jackelin Medley MD   2 spray at 01/04/17 0957   • gabapentin (NEURONTIN) capsule 100 mg  100 mg Oral Nightly Jackelin Medley MD   100 mg at 01/03/17 2251   • heparin (porcine) 5000 UNIT/ML injection 5,000 Units  5,000 Units Subcutaneous Q12H SHAGUFTA Dunham   5,000 Units at 01/04/17 0957   • ipratropium-albuterol (DUO-NEB) nebulizer solution 3 mL  3 mL Nebulization Q4H PRN SHAGUFTA Dunham   3 mL at 01/04/17 0124   • LORazepam (ATIVAN) tablet 0.5 mg  0.5 mg Oral BID PRN Jackelin Medley MD       • losartan (COZAAR) tablet 100 mg  100 mg Oral Daily Jackelin Medley MD   100 mg at 01/04/17 0958   • meclizine (ANTIVERT) tablet 12.5 mg  12.5 mg Oral BID PRN Jackelin Medley MD       • ondansetron (ZOFRAN) injection 4 mg  4 mg Intravenous Q6H PRN Jackelin Medley MD       • pantoprazole (PROTONIX) EC tablet 40 mg  40 mg Oral Q AM Jackelin Medley MD    "40 mg at 01/04/17 0500   • piperacillin-tazobactam (ZOSYN) 3.375 g in dextrose 50 mL IVPB (premix)  3.375 g Intravenous Q6H Jackelin Medley MD   3.375 g at 01/04/17 1614   • polyethylene glycol (MIRALAX) powder 17 g  17 g Oral Daily PRN SHAGUFTA Becker       • sodium chloride 0.9 % flush 1-10 mL  1-10 mL Intravenous PRN Jackelin Medley MD       • sodium chloride 0.9 % flush 10 mL  10 mL Intravenous PRN Con Hill MD            •  acetaminophen  •  docusate sodium  •  ipratropium-albuterol  •  LORazepam  •  meclizine  •  ondansetron  •  polyethylene glycol  •  sodium chloride  •  sodium chloride  No Known Allergies  History reviewed. No pertinent family history.  Social History     Social History   • Marital status:      Spouse name: N/A   • Number of children: N/A   • Years of education: N/A     Occupational History   • Not on file.     Social History Main Topics   • Smoking status: Never Smoker   • Smokeless tobacco: Not on file   • Alcohol use No   • Drug use: No   • Sexual activity: Defer     Other Topics Concern   • Not on file     Social History Narrative    Lives at [facility]   Code Status: Discussion held with patient and her daughter/Roseline fraire. Decision was made to change from full code status to conditional code.  Advance Directives:  Completed, Roseline guthrie, is decision maker  Review of Systems - +/- per HPI and symptom review.    PPS: 50%  Visit Vitals   • /80   • Pulse 90   • Temp 98.3 °F (36.8 °C) (Oral)   • Resp 18   • Ht 62\" (157.5 cm)   • Wt 113 lb 11.2 oz (51.6 kg)   • SpO2 93%   • BMI 20.8 kg/m2     113 lb 11.2 oz (51.6 kg) Body mass index is 20.8 kg/(m^2).  Intake & Output (last day)       01/04 0701 - 01/05 0700    P.O.     IV Piggyback 100    Total Intake(mL/kg) 100 (1.9)    Net +100         Unmeasured Urine Occurrence 3 x    Unmeasured Stool Occurrence 3 x          Physical Exam:    General Appearance:    No acute distress, frail, up in " recliner   Head:    Normocephalic, without obvious abnormality, atraumatic   Eyes:            Conjunctivae and sclerae normal, no icterus,  BETZAIDA       Throat:   No oral lesions, no thrush, oral mucosa moist           Lungs:     Clear to auscultation,respirations regular, even and                  nonlabored    Heart:    Regular rhythm and normal rate, normal S1        Abdomen:     Normal bowel sounds, no masses, soft, non-distended, no       guarding, non tender       Extremities:   Moves all extremities well, no redness, no cyanosis, no edema, no bilateral feet sores or wounds             Pulses:   Distal pulses palpable and equal bilaterally   Skin:   Wm, dry       Neurologic:   Alert, appropriate conversation, follows commands         Results from last 7 days  Lab Units 01/02/17  1658   WBC 10*3/mm3 5.73   HEMOGLOBIN g/dL 8.8*   HEMATOCRIT % 27.8*   PLATELETS 10*3/mm3 172       Results from last 7 days  Lab Units 01/04/17  0513  01/02/17  1658   SODIUM mmol/L 138  < > 141   POTASSIUM mmol/L 3.2*  < > 4.5   CHLORIDE mmol/L 99  < > 102   TOTAL CO2 mmol/L 30.0  < > 29.0   BUN mg/dL 31*  < > 30*   CREATININE mg/dL 1.20  < > 1.10   CALCIUM mg/dL 8.8  < > 9.2   BILIRUBIN mg/dL  --   --  0.8   ALK PHOS U/L  --   --  148*   ALT (SGPT) U/L  --   --  14   AST (SGOT) U/L  --   --  25   GLUCOSE mg/dL 89  < > 102*   < > = values in this interval not displayed.    Results from last 7 days  Lab Units 01/04/17  0513   SODIUM mmol/L 138   POTASSIUM mmol/L 3.2*   CHLORIDE mmol/L 99   TOTAL CO2 mmol/L 30.0   BUN mg/dL 31*   CREATININE mg/dL 1.20   GLUCOSE mg/dL 89   CALCIUM mg/dL 8.8     Imaging Results (last 72 hours)     Procedure Component Value Units Date/Time    XR Chest 1 View [[id number]]  (Abnormal) Collected:  01/02/17 1813     Updated:  01/02/17 2230    Narrative:       EXAM:  XR Chest, 1 View.    CLINICAL HISTORY:  89 years old, female; Signs and symptoms; Shortness of breath; Additional info:   SOB    TECHNIQUE:  Frontal  view of the chest.    EXAM DATE/TIME:  1/2/2017 6:13 PM    COMPARISON:  CR - XR CHEST 1 VW 11/23/2016 10:36:04 AM    FINDINGS:  No cardiac or pulmonary vascular enlargement. Grossly stable pacemaker   placement.    The cardiac borders are not entirely seen but there otherwise again appears to   be cardiomegaly.    Pneumonitis and perhaps other inflammatory change versus atelectatic changes at   some levels of each lung. Somewhat increased but indistinct density in the   right hilar/perihilar region is of uncertain significance.    There appears to be a pleural effusion bilaterally.    No pneumothorax bilaterally.    Grossly intact visualized skeletal structures.        Impression:       Inflammatory versus atelectatic changes at some levels of each lung.    Bilateral pleural effusions.    Other findings as above.         THIS DOCUMENT HAS BEEN ELECTRONICALLY SIGNED BY MONICA VARGAS MD        Impression: 89 y.o. female with severe valvular heasrt disease, diastolic heart failure, pulmonary hypertension, with pacemaker  Plan:   Dyspnea, peripheral edema - continue to monitor, cardiiology following.     CODE STATUS discussion was held with change of status from full code to conditional code - DNR/DNI, agrees to most interventions otherwise. Please see order.     Plan - palliative med will follow to provide support for any ongoing clinical decisions.   Daughter interested to speak with Dr Isbell. Encouraged for her to call his office and leave message.      Martha Biswas, APRN  [phone]  01/04/17  7:57 PM      Time: >45 minutes spent reviewing medical and medication records, assessing and examining patient, discussing with patient and family, answering questions, formulating a plan and documentation of care.    > 50% time spent face to face

## 2017-01-05 NOTE — PROGRESS NOTES
Palliative Care Progress Note    Date of Admission: 1/2/2017    Subjective:  Patient with this point time other she does have some occasional dyspnea, but feels it is not overly burdensome.  No current facility-administered medications on file prior to encounter.      Current Outpatient Prescriptions on File Prior to Encounter   Medication Sig Dispense Refill   • acetaminophen (TYLENOL) 325 MG tablet Take 2 tablets by mouth Every 4 (Four) Hours As Needed for mild pain (1-3).  0   • aspirin 325 MG tablet Take 325 mg by mouth Daily.     • brimonidine (ALPHAGAN) 0.15 % ophthalmic solution Administer 1 drop to the right eye 2 (Two) Times a Day.     • diclofenac (VOLTAREN) 25 MG EC tablet Take 25 mg by mouth 2 (Two) Times a Day.     • fexofenadine (ALLEGRA) 180 MG tablet Take 180 mg by mouth daily.     • FLUoxetine (PROzac) 40 MG capsule Take 40 mg by mouth Daily.     • fluticasone (FLONASE) 50 MCG/ACT nasal spray 2 sprays into each nostril Daily.     • folic acid (FOLVITE) 400 MCG tablet Take 400 mcg by mouth daily.     • gabapentin (NEURONTIN) 100 MG capsule Take 100 mg by mouth Every Night.     • LORazepam (ATIVAN) 0.5 MG tablet Take 1 tablet by mouth 2 (Two) Times a Day As Needed for anxiety. 10 tablet 0   • meclizine (ANTIVERT) 12.5 MG tablet Take 12.5 mg by mouth 2 (Two) Times a Day As Needed for dizziness.     • melatonin 5 MG tablet tablet Take 5 mg by mouth At Night As Needed.     • omeprazole (priLOSEC) 20 MG capsule Take 20 mg by mouth Daily.     • ondansetron ODT (ZOFRAN-ODT) 4 MG disintegrating tablet Take 4 mg by mouth 3 (Three) Times a Day As Needed for nausea or vomiting.     • polyethylene glycol (MIRALAX) packet Take 17 g by mouth Daily As Needed.          •  acetaminophen  •  docusate sodium  •  ipratropium-albuterol  •  LORazepam  •  meclizine  •  ondansetron  •  polyethylene glycol  •  potassium chloride **OR** potassium chloride **OR** potassium chloride  •  sodium chloride  •  sodium  "chloride    Objective:   Visit Vitals   • /70   • Pulse 89   • Temp 97.9 °F (36.6 °C) (Oral)   • Resp 18   • Ht 62\" (157.5 cm)   • Wt 113 lb 4.8 oz (51.4 kg)   • SpO2 94%   • BMI 20.72 kg/m2        Intake/Output Summary (Last 24 hours) at 01/05/17 1002  Last data filed at 01/04/17 1614   Gross per 24 hour   Intake     50 ml   Output      0 ml   Net     50 ml     Physical Exam:      General Appearance:    Alert, cooperative, in no acute distress, frail appearing   Head:    Normocephalic, without obvious abnormality, atraumatic   Eyes:            Lids and lashes normal, conjunctivae and sclerae normal, no   icterus, no pallor, corneas clear, PERRLA   Ears:    Ears appear intact with no abnormalities noted   Throat:   No oral lesions, no thrush, oral mucosa moist   Neck:   No adenopathy, supple, trachea midline, no thyromegaly, no     carotid bruit, no JVD   Back:     No kyphosis present, no scoliosis present, no skin lesions,       erythema or scars, no tenderness to percussion or                   palpation,   range of motion normal   Lungs:    Decreased breathe sounds throughout    Heart:    Regular rhythm and normal rate, normal S1 and S2, no            murmur, no gallop, no rub, no click   Breast Exam:    Deferred   Abdomen:     Normal bowel sounds, no masses, no organomegaly, soft        non-tender, non-distended, no guarding, no rebound                 tenderness   Genitalia:    Deferred   Extremities:   Moves all extremities well, no edema, no cyanosis, no              redness   Pulses:   Pulses palpable and equal bilaterally   Skin:   No bleeding, bruising or rash   Lymph nodes:   No palpable adenopathy   Neurologic:   Cranial nerves 2 - 12 grossly intact, sensation intact, DTR        present and equal bilaterally       Results from last 7 days  Lab Units 01/02/17  1658   WBC 10*3/mm3 5.73   HEMOGLOBIN g/dL 8.8*   HEMATOCRIT % 27.8*   PLATELETS 10*3/mm3 172       Results from last 7 days  Lab Units " 01/05/17  0622  01/02/17  1658   SODIUM mmol/L 138  < > 141   POTASSIUM mmol/L 3.7  < > 4.5   CHLORIDE mmol/L 100  < > 102   TOTAL CO2 mmol/L 31.0  < > 29.0   BUN mg/dL 16  < > 30*   CREATININE mg/dL 1.00  < > 1.10   CALCIUM mg/dL 9.2  < > 9.2   BILIRUBIN mg/dL  --   --  0.8   ALK PHOS U/L  --   --  148*   ALT (SGPT) U/L  --   --  14   AST (SGOT) U/L  --   --  25   GLUCOSE mg/dL 100  < > 102*   < > = values in this interval not displayed.    Impression: Diastolic congestive heart failure  Atrial fibrillation  Dyspnea  Goals of care  Plan: We'll continue to patient returns sentiment regimen is working fairly well.  Patient is continuing to get treated however we'll probably need to look at discussing hospice at some point in the near future.        Rocco Plata DO  01/05/17  10:02 AM

## 2017-01-05 NOTE — PROGRESS NOTES
"Acute Care - Physical Therapy Treatment Note  [facility]     Patient Name: Avril Allen  : 1921  MRN: [id number]  Today's Date: 2017  Onset of Illness/Injury or Date of Surgery Date: 17  Date of Referral to PT: 17  Referring Physician: Jackelin Medley MD    Admit Date: 2017    Visit Dx:    ICD-10-CM ICD-9-CM   1. Acute diastolic (congestive) heart failure I50.31 428.31     428.0   2. Bronchospasm J98.01 519.11   3. Acute respiratory failure with hypoxia J96.01 518.81   4. Community acquired pneumonia J18.9 486   5. Impaired mobility and ADLs Z74.09 799.89   6. Impaired functional mobility, balance, gait, and endurance Z74.09 V49.89   7. Chronic atrial fibrillation I48.2 427.31   8. Essential hypertension I10 401.9     Patient Active Problem List   Diagnosis   • Hypertension   • Arthritis   • Altered mental status   • Transient cerebral ischemia   • Atrial fibrillation, no anticoags due to hx GIB and fall risk   • Pacemaker at end of battery life   • Pacemaker-dependent due to native cardiac rhythm insufficient to support life   • Failure to thrive in adult   • Acute blood loss anemia   • GI bleed   • Acute diastolic (congestive) heart failure   • Pulmonary hypertension   • Acute hypoxic respiratory failure   • Healthcare Associated Pneumonia   • Valvular heart disease               Adult Rehabilitation Note       17 1320 17 0915       Rehab Assessment/Intervention    Discipline physical therapist  -MB physical therapist  -DM     Document Type therapy note (daily note)  -MB therapy note (daily note)  -DM     Subjective Information agree to therapy;complains of;fatigue  -MB agree to therapy;complains of;weakness;dyspnea   incontBMx2(diarrhea);nsg pagedMD,gave meds;\"I'll sit up>4 hr  -DM     Patient Effort, Rehab Treatment good  -MB good  -DM     Symptoms Noted During/After Treatment none  -MB fatigue;shortness of breath;significant change in vital signs   Signif decr.BP RUE " mid-gt.(resting on BSC);elev s/p5min rest  -DM     Precautions/Limitations fall precautions;oxygen therapy device and L/min  -MB oxygen therapy device and L/min;fall precautions   very impulsive;dumped H20 in bed whenPCT didn't refill cup  -DM     Recorded by [MB] Joann Mariano, PT [DM] Kiera Bennett, PT     Vital Signs    Pre Systolic BP Rehab  105   LUE  -DM     Pre Treatment Diastolic BP  69  -DM     Intra Systolic BP Rehab  78   78/46 RUE;5 min later in LUE:102/70  -DM     Intra Treatment Diastolic BP  46  -DM     Post Systolic BP Rehab  100   LUE  -DM     Post Treatment Diastolic BP  72  -DM     Pretreatment Heart Rate (beats/min)  93  -DM     Intratreatment Heart Rate (beats/min)  98  -DM     Posttreatment Heart Rate (beats/min)  93  -DM     Pre SpO2 (%)  99  -DM     O2 Delivery Pre Treatment  supplemental O2   2 L  -DM     Intra SpO2 (%)  91  -DM     O2 Delivery Intra Treatment  supplemental O2  -DM     Post SpO2 (%)  994  -DM     O2 Delivery Post Treatment  supplemental O2  -DM     Pre Patient Position  Sitting  -DM     Intra Patient Position  Sitting  -DM     Post Patient Position  Sitting  -DM     Recorded by  [DM] Kiera Bennett, PT     Pain Assessment    Pain Assessment No/denies pain  -MB No/denies pain  -DM     Recorded by [MB] Joann Mariano, PT [DM] Kiera Bennett, PT     Vision Assessment/Intervention    Visual Impairment  WFL with corrective lenses  -DM     Recorded by  [DM] Kiera Bennett PT     Cognitive Assessment/Intervention    Current Cognitive/Communication Assessment impaired  -MB impaired  -DM     Orientation Status oriented to;person;place  -MB oriented to;person;place;situation  -DM     Follows Commands/Answers Questions 75% of the time;able to follow single-step instructions;needs cueing  -% of the time;able to follow single-step instructions;needs cueing;needs increased time;needs repetition  -DM     Personal Safety mild impairment;decreased awareness, need for  "assist;decreased awareness, need for safety;decreased insight to deficits  -MB mild impairment;impulsive;decreased awareness, need for assist;decreased awareness, need for safety;decreased insight to deficits  -DM     Personal Safety Interventions fall prevention program maintained;gait belt;nonskid shoes/slippers when out of bed;muscle strengthening facilitated  -MB elopement precautions initiated;fall prevention program maintained;gait belt;nonskid shoes/slippers when out of bed  -DM     Recorded by [MB] Joann Mariano, PT [DM] Kiera Bennett, PT     Bed Mobility, Assessment/Treatment    Bed Mobility, Roll Left, [place]  other (see comments)   UIC  -DM     Bed Mob, Supine to Sit, [place] not tested   Pt. UI.  -MB      Recorded by [MB] Joann Mariano, PT [DM] Kiera Bennett, PT     Transfer Assessment/Treatment    Transfers, Sit-Stand [place] minimum assist (75% patient effort);verbal cues required  -MB moderate assist (50% patient effort);verbal cues required   3 stand.trials;had BM;PCT helped change depends/pads  -DM     Transfers, Stand-Sit [place] minimum assist (75% patient effort);verbal cues required  -MB verbal cues required;minimum assist (75% patient effort)  -DM     Transfers, Sit-Stand-Sit, Assist Device rolling walker  -MB rolling walker  -DM     Transfer, Safety Issues step length decreased;weight-shifting ability decreased;balance decreased during turns;loses balance backward;impulsivity  -MB knees buckling;weight-shifting ability decreased;loses balance backward  -DM     Transfer, Impairments strength decreased;impaired balance  -MB strength decreased;impaired balance;postural control impaired  -DM     Transfer, Comment VCs for safe hand placement, to push from armrest to stand and reach back prior to t/f.  During standing balance activities, patient stated, \"I'm gonna sit\" and began to sit abruptly before positioning in front of chair, requiring assist to lower safely " to sitting.   -MB FREQ reminders for safety/hand placement & shifting wt over C of G  -DM     Recorded by [MB] Joann Mariano, PT [DM] Kiera Bennett, PT     Gait Assessment/Treatment    Gait, [place] Level minimum assist (75% patient effort);verbal cues required   chair in tow  -MB minimum assist (75% patient effort);verbal cues required  -DM     Gait, Assistive Device rolling walker  -MB rolling walker  -DM     Gait, Distance (Feet) 10   Pt. declined further gait.  -MB 20   10 min rest midway;nsg assessed;BP 78/46 (R);retook LUE  -DM     Gait, Gait Pattern Analysis swing-to gait  -MB swing-to gait  -DM     Gait, Gait Deviations ranjit decreased;forward flexed posture;bilateral:;step length decreased  -MB ranjit decreased;decreased heel strike;knee buckling;forward flexed posture;narrow base;step length decreased;toe-to-floor clearance decreased  -DM     Gait, Safety Issues balance decreased during turns;step length decreased;weight-shifting ability decreased;supplemental O2  -MB step length decreased;loses balance backward;supplemental O2  -DM     Gait, Impairments strength decreased;impaired balance  -MB strength decreased;impaired balance;postural control impaired  -DM     Gait, Comment Pt. amb slowly w/ VCs for upright posture and inc B step length.  Pt. amb 10 ft, then requested to sit, declining further gait.  -MB pt wants her tripod R wx from NH  -DM     Recorded by [MB] Joann Mariano, PT [DM] Kiera Bennett, PT     Motor Skills/Interventions    Additional Documentation  Balance Skills Training (Group)  -DM     Recorded by  [DM] Kiera Bennett, PT     Balance Skills Training    Sitting-Level of Assistance  Close supervision  -DM     Sitting-Balance Support  Feet supported  -DM     Sitting-Balance Activities  Trunk control activities  -DM     Sitting # of Minutes  10  -DM     Standing-Level of Assistance Minimum assistance  -MB Moderate assistance   INCR. trunk flex  -DM     Static Standing  Balance Support assistive device  -MB assistive device  -DM     Standing-Balance Activities Weight Shift R-L;Weight Shift A-P  -MB Weight Shift A-P;Weight Shift R-L  -DM     Standing Balance # of Minutes 3  -MB 6  -DM     Recorded by [MB] Joann Mariano, PT [DM] Kiera Bennett, PT     Therapy Exercises    Bilateral Lower Extremities AROM:;10 reps;sitting;ankle pumps/circles;hip flexion;hip abduction/adduction;LAQ  -MB AROM:;10 reps;sitting;ankle pumps/circles;heel slides;hip abduction/adduction;hip ER;hip flexion;hip IR;LAQ;quad sets  -DM     Recorded by [MB] Joann Mariano, PT [DM] Kiera Bennett PT     Positioning and Restraints    Pre-Treatment Position sitting in chair/recliner  -MB sitting in chair/recliner  -DM     Post Treatment Position chair  -MB chair  -DM     In Chair notified nsg;reclined;call light within reach;encouraged to call for assist;exit alarm on;with family/caregiver;legs elevated  -MB sitting;call light within reach;exit alarm on;with nsg;RUE elevated;LUE elevated;waffle cushion;legs elevated  -DM     Recorded by [MB] oJann Mariano, PT [DM] Kiera Bennett, PT       User Key  (r) = Recorded By, (t) = Taken By, (c) = Cosigned By    Initials Name Effective Dates    DM Kiera Bennett, PT 06/19/15 -     GEORGES Mariano, PT 03/14/16 -                 IP PT Goals       01/05/17 1357 01/04/17 1042 01/03/17 1634    Bed Mobility PT LTG    Bed Mobility PT LTG, Date Established  01/03/17  -DM 01/03/17  -DM    Bed Mobility PT LTG, Time to Achieve  1 wk  -DM 1 wk  -DM    Bed Mobility PT LTG, Activity Type  all bed mobility  -DM all bed mobility  -DM    Bed Mobility PT LTG, [place] Level  independent  -DM independent  -DM    Bed Mobility PT LTG, Outcome goal ongoing  -MB goal ongoing  -DM     Transfer Training PT LTG    Transfer Training PT LTG, Date Established  01/03/17  -DM 01/03/17  -DM    Transfer Training PT LTG, Time to Achieve  1 wk  -DM 1 wk  -DM    Transfer Training PT  LTG, Activity Type  sit to stand/stand to sit;bed to chair /chair to bed;toilet  -DM bed to chair /chair to bed;sit to stand/stand to sit;toilet  -DM    Transfer Training PT LTG, [place] Level  supervision required  -DM supervision required  -DM    Transfer Training PT LTG, Assist Device  walker, rolling  -DM walker, rolling  -DM    Transfer Training PT LTG, Outcome goal ongoing  -MB goal ongoing  -DM     Gait Training PT LTG    Gait Training Goal PT LTG, Date Established  01/03/17  -DM 01/03/17  -DM    Gait Training Goal PT LTG, Time to Achieve  1 wk  -DM 1 wk  -DM    Gait Training Goal PT LTG, [place] Level  contact guard assist  -DM contact guard assist  -DM    Gait Training Goal PT LTG, Assist Device  walker, rolling  -DM walker, rolling  -DM    Gait Training Goal PT LTG, Distance to Achieve  30  -DM 30   w/ stable VS  -DM    Gait Training Goal PT LTG, Outcome goal ongoing  -MB goal ongoing  -DM       User Key  (r) = Recorded By, (t) = Taken By, (c) = Cosigned By    Initials Name Provider Type    DM Kiera Bennett, PT Physical Therapist    GEORGES Mariano, PT Physical Therapist          Physical Therapy Education     Title: PT OT SLP Therapies (Active)     Topic: Physical Therapy (Active)     Point: Mobility training (Active)    Learning Progress Summary    Learner Readiness Method Response Comment Documented by Status   Patient Acceptance E,D NR fall precautions, home safety, gait mechanics, HEP MB 01/05/17 1356 Active    Eager E,D NR  DM 01/04/17 1042 Active    Eager E,D NR  DM 01/03/17 1634 Active    Eager E VU  AP 01/03/17 0056 Done   Family Acceptance E,D NR fall precautions, home safety, gait mechanics, HEP MB 01/05/17 1356 Active    Eager E,D NR  DM 01/03/17 1634 Active               Point: Home exercise program (Active)    Learning Progress Summary    Learner Readiness Method Response Comment Documented by Status   Patient Acceptance E,D NR fall precautions, home safety, gait mechanics,  HEP MB 01/05/17 1356 Active    Eager E,D NR  DM 01/04/17 1042 Active    Eager E,D NR  DM 01/03/17 1634 Active    Eager E VU  AP 01/03/17 0056 Done   Family Acceptance E,D NR fall precautions, home safety, gait mechanics, HEP MB 01/05/17 1356 Active    Eager E,D NR  DM 01/03/17 1634 Active               Point: Body mechanics (Active)    Learning Progress Summary    Learner Readiness Method Response Comment Documented by Status   Patient Acceptance E,D NR fall precautions, home safety, gait mechanics, HEP MB 01/05/17 1356 Active    Eager E,D NR  DM 01/04/17 1042 Active    Eager E,D NR  DM 01/03/17 1634 Active    Eager E VU  AP 01/03/17 0056 Done   Family Acceptance E,D NR fall precautions, home safety, gait mechanics, HEP MB 01/05/17 1356 Active    Eager E,D NR  DM 01/03/17 1634 Active               Point: Precautions (Active)    Learning Progress Summary    Learner Readiness Method Response Comment Documented by Status   Patient Acceptance E,D NR fall precautions, home safety, gait mechanics, HEP MB 01/05/17 1356 Active    Eager E,D NR  DM 01/04/17 1042 Active    Eager E,D NR  DM 01/03/17 1634 Active    Eager E VU   01/03/17 0056 Done   Family Acceptance E,D NR fall precautions, home safety, gait mechanics, HEP MB 01/05/17 1356 Active    Eager E,D NR  DM 01/03/17 1634 Active                      User Key     Initials Effective Dates Name Provider Type Discipline     06/19/15 -  Kiera Bennett, PT Physical Therapist PT    MB 03/14/16 -  Joann Mairano, PT Physical Therapist PT     06/16/16 -  Robina Kimball RN Registered Nurse Nurse                    PT Recommendation and Plan  Anticipated Discharge Disposition: skilled nursing facility  PT Frequency: daily  Plan of Care Review  Plan Of Care Reviewed With: patient, daughter  Progress: progress toward functional goals as expected  Outcome Summary/Follow up Plan: Pt. w/ limited participation this date, requiring encouragement to participate.  Pt. w/ dec  safety awareness, sitting abruptly during standing balance activities.  Pt. will continue to benefit from skilled IPPT to progress POC and maximize patient's safety and ind. w/ func. mob.          Outcome Measures       01/05/17 1320 01/04/17 0915 01/03/17 1430    How much help from another person do you currently need...    Turning from your back to your side while in flat bed without using bedrails? 3  -MB 3  -DM 3  -DM    Moving from lying on back to sitting on the side of a flat bed without bedrails? 3  -MB 3  -DM 3  -DM    Moving to and from a bed to a chair (including a wheelchair)? 3  -MB 3  -DM 3  -DM    Standing up from a chair using your arms (e.g., wheelchair, bedside chair)? 2  -MB 2  -DM 3  -DM    Climbing 3-5 steps with a railing? 1  -MB 1  -DM 1  -DM    To walk in hospital room? 3  -MB 3  -DM 3  -DM    AM-PAC 6 Clicks Score 15  -MB 15  -DM 16  -DM    Functional Assessment    Outcome Measure Options AM-PAC 6 Clicks Basic Mobility (PT)  -MB AM-PAC 6 Clicks Basic Mobility (PT)  -DM AM-PAC 6 Clicks Basic Mobility (PT)  -DM      01/03/17 1424          How much help from another is currently needed...    Putting on and taking off regular lower body clothing? 2  -AR      Bathing (including washing, rinsing, and drying) 2  -AR      Toileting (which includes using toilet bed pan or urinal) 1  -AR      Putting on and taking off regular upper body clothing 3  -AR      Taking care of personal grooming (such as brushing teeth) 3  -AR      Eating meals 4  -AR      Score 15  -AR      Functional Assessment    Outcome Measure Options AM-PAC 6 Clicks Daily Activity (OT)  -AR        User Key  (r) = Recorded By, (t) = Taken By, (c) = Cosigned By    Initials Name Provider Type    NICHOLAS Bennett, PT Physical Therapist    NORA Garcia, OT Occupational Therapist    GEORGES Mariano, PT Physical Therapist           Time Calculation:         PT Charges       01/05/17 1766          Time Calculation    Start  Time 1320  -MB      PT Received On 01/05/17  -MB      PT Goal Re-Cert Due Date 01/03/17  -MB      Time Calculation- PT    Total Timed Code Minutes- PT 35 minute(s)  -MB        User Key  (r) = Recorded By, (t) = Taken By, (c) = Cosigned By    Initials Name Provider Type    GEORGES Mariano, PT Physical Therapist          Therapy Charges for Today     Code Description Service Date Service Provider Modifiers Qty    26443608769 HC GAIT TRAINING EA 15 MIN 1/5/2017 Joann Mariano, PT GP 1    82173243451 HC PT THER PROC EA 15 MIN 1/5/2017 Joann Mariano, PT GP 1          PT G-Codes  Outcome Measure Options: AM-PAC 6 Clicks Basic Mobility (PT)    Joann Mariano, PT  1/5/2017

## 2017-01-06 NOTE — PROGRESS NOTES
Avril Allen  [id number]  9/8/1921   LOS: 4 days   Patient Care Team:  Radha Pal MD as PCP - General (Internal Medicine)    Chief Complaint:  SOB / WEAKNESS    Subjective      Patient states no chest pain or increased SOB. Has nonproductive cough and denies sputum but does say her throat is dry and she keeps repeating over and over she is supposed to be getting coffee for an hour. Says her right knee hurts from PT.     Objective     Vital Sign Min/Max for last 24 hours  Temp  Min: 97.8 °F (36.6 °C)  Max: 98.1 °F (36.7 °C)   BP  Min: 94/63  Max: 112/64   Pulse  Min: 82  Max: 98   Resp  Min: 18  Max: 20   SpO2  Min: 92 %  Max: 94 %   Flow (L/min)  Min: 2  Max: 3.5   Weight  Min: 116 lb 3.2 oz (52.7 kg)  Max: 116 lb 3.2 oz (52.7 kg)     Last 2 weights    01/05/17  0512 01/06/17  0500   Weight: 113 lb 4.8 oz (51.4 kg) 116 lb 3.2 oz (52.7 kg)         Intake/Output Summary (Last 24 hours) at 01/06/17 0715  Last data filed at 01/06/17 0405   Gross per 24 hour   Intake    860 ml   Output      0 ml   Net    860 ml       Physical Exam:     General Appearance:    Alert, cooperative, in no acute distress   Lungs:     Clear to auscultation,respirations regular, even and                   unlabored    Heart:    Irregular and normal rate, normal S1 and S2, 2/6   murmur, no gallop, no rub, no click   Abdomen:  Extremities:   Soft, non-tender        No edema             Pulses:   Pulses palpable and equal bilaterally      Results Review:     Results from last 7 days  Lab Units 01/05/17  0622 01/04/17  0513 01/03/17  0447   SODIUM mmol/L 138 138 142   POTASSIUM mmol/L 3.7 3.2* 3.6   CHLORIDE mmol/L 100 99 103   TOTAL CO2 mmol/L 31.0 30.0 27.0   BUN mg/dL 16 31* 29*   CREATININE mg/dL 1.00 1.20 1.10   GLUCOSE mg/dL 100 89 97   CALCIUM mg/dL 9.2 8.8 9.1       Results from last 7 days  Lab Units 01/05/17  1133 01/02/17  1658   WBC 10*3/mm3 9.38 5.73   HEMOGLOBIN g/dL 8.3* 8.8*   HEMATOCRIT % 26.1* 27.8*   PLATELETS 10*3/mm3  190 172           NO CXR / EKG.        Medication Review: Reviewed    Assessment/Plan      Options remain very limited in view of her multiple medical problems and advanced age; she is not felt to be a candidate for surgical correction of her valvular heart disease. Palliative care. No current labs to review this morning. Would continue current cardiac medications. Persistent anemia but improvement in CHF symptoms.  Marginal systolic blood pressure with no intake and output records to review.  Would pursue current cardiac medications and palliative care and comfort measures.  May need to alter IV Bumex to 1 mg twice a day.       Principal Problem:    Acute diastolic (congestive) heart failure  Active Problems:    Hypertension    Atrial fibrillation, no anticoags due to hx GIB and fall risk    Pacemaker at end of battery life    Pulmonary hypertension    Acute hypoxic respiratory failure    Healthcare Associated Pneumonia    Valvular heart disease    Scribed for Lucas Isbell MD by SHAGUFTA Vega. 1/6/2017  7:15 AM     I, Lucas Isbell MD, [facility], personally performed the services described in this documentation as scribed by the above named individual in my presence, and it is both accurate and complete.       01/06/17  7:15 AM

## 2017-01-06 NOTE — PROGRESS NOTES
Continued Stay Note   [facility]     Patient Name: Avril Allen  MRN: [id number]  Today's Date: 1/6/2017    Admit Date: 1/2/2017          Discharge Plan       01/06/17 0940    Case Management/Social Work Plan    Plan [facility] SNF    Patient/Family In Agreement With Plan yes    Additional Comments Spoke with patient at bedside. She would still like to return to assisted living at [facility] when medically ready. However, they will reasses at discharge to determine if she is still appropriate for that level of care. At this time PT/OT are recommending a SNF. CM will follow.               Discharge Codes     None        Expected Discharge Date and Time     Expected Discharge Date Expected Discharge Time    Jan 6, 2017             Camille Diaz RN

## 2017-01-06 NOTE — PLAN OF CARE
Problem: Patient Care Overview (Adult)  Goal: Plan of Care Review  Outcome: Ongoing (interventions implemented as appropriate)

## 2017-01-07 PROBLEM — E87.6 HYPOKALEMIA: Status: ACTIVE | Noted: 2017-01-01

## 2017-01-07 PROBLEM — D64.9 CHRONIC ANEMIA: Status: ACTIVE | Noted: 2017-01-01

## 2017-01-07 NOTE — PROGRESS NOTES
Acute Care - Occupational Therapy Progress Note  [facility]     Patient Name: Avril Allen  : 1921  MRN: [id number]  Today's Date: 2017  Onset of Illness/Injury or Date of Surgery Date: 17  Date of Referral to OT: 17  Referring Physician: Jackelin Medley MD      Admit Date: 2017    Visit Dx:     ICD-10-CM ICD-9-CM   1. Acute diastolic (congestive) heart failure I50.31 428.31     428.0   2. Bronchospasm J98.01 519.11   3. Acute respiratory failure with hypoxia J96.01 518.81   4. Community acquired pneumonia J18.9 486   5. Impaired mobility and ADLs Z74.09 799.89   6. Impaired functional mobility, balance, gait, and endurance Z74.09 V49.89   7. Chronic atrial fibrillation I48.2 427.31   8. Essential hypertension I10 401.9     Patient Active Problem List   Diagnosis   • Hypertension   • Arthritis   • Altered mental status   • Transient cerebral ischemia   • Atrial fibrillation, no anticoags due to hx GIB and fall risk   • Pacemaker at end of battery life   • Pacemaker-dependent due to native cardiac rhythm insufficient to support life   • Failure to thrive in adult   • Acute blood loss anemia   • GI bleed   • Acute diastolic (congestive) heart failure   • Pulmonary hypertension   • Acute hypoxic respiratory failure   • Healthcare Associated Pneumonia   • Valvular heart disease             Adult Rehabilitation Note       17 1044 17 1409 17 1320    Rehab Assessment/Intervention    Discipline occupational therapist  -AN occupational therapist  -TA physical therapist  -MB    Document Type therapy note (daily note)  -AN therapy note (daily note)  -TA therapy note (daily note)  -MB    Subjective Information no complaints;agree to therapy   pt supine, IV intact,O2 donned  -AN agree to therapy;complains of;fatigue  -TA agree to therapy;complains of;fatigue  -MB    Patient Effort, Rehab Treatment good  -AN adequate  -TA good  -MB    Symptoms Noted During/After Treatment   none  -MB     Precautions/Limitations fall precautions;oxygen therapy device and L/min  -AN fall precautions  -TA fall precautions;oxygen therapy device and L/min  -MB    Specific Treatment Considerations Spoke to RN regarding pt having some difficulty speaking this date. Pt c/o sore throat and suction assist prior to OT tx.  -AN      Recorded by [AN] Shy Ridley, OT [TA] Fito Ryan, OT [MB] Joann Mariano, PT    Vital Signs    Pre Systolic BP Rehab --   vitals stable, pt on auto BP, RN present for vitals  -AN      Pre SpO2 (%) 95  -AN 98  -TA     O2 Delivery Pre Treatment supplemental O2  -AN supplemental O2   2L  -TA     Intra SpO2 (%)  78  -TA     O2 Delivery Intra Treatment  supplemental O2   2L  -TA     Post SpO2 (%) 95  -AN 97  -TA     O2 Delivery Post Treatment supplemental O2  -AN supplemental O2   2L  -TA     Pre Patient Position  Sitting  -TA     Intra Patient Position  Sitting  -TA     Post Patient Position  Sitting  -TA     Recorded by [AN] Shy Ridley, OT [TA] Fito Ryan OT     Pain Assessment    Pain Assessment No/denies pain  -AN 0-10  -TA No/denies pain  -MB    Pain Score  0  -TA     Post Pain Score  0  -TA     Pain Intervention(s)  Ambulation/increased activity  -TA     Recorded by [AN] Shy Ridley, OT [TA] Fito Ryan, OT [MB] Joann Mariano, PT    Cognitive Assessment/Intervention    Current Cognitive/Communication Assessment  impaired  -TA impaired  -MB    Orientation Status oriented to;person;place;situation  -AN oriented to;person;place;situation   some confusion regarding timeline of events today per DTR.  -TA oriented to;person;place  -MB    Follows Commands/Answers Questions 100% of the time  -% of the time;able to follow single-step instructions;needs cueing;needs increased time;needs repetition  -TA 75% of the time;able to follow single-step instructions;needs cueing  -MB    Personal Safety mild impairment  -AN mild impairment;decreased awareness, need for  "assist;decreased awareness, need for safety;decreased insight to deficits  -TA mild impairment;decreased awareness, need for assist;decreased awareness, need for safety;decreased insight to deficits  -MB    Personal Safety Interventions  fall prevention program maintained;gait belt;nonskid shoes/slippers when out of bed;supervised activity  -TA fall prevention program maintained;gait belt;nonskid shoes/slippers when out of bed;muscle strengthening facilitated  -MB    Recorded by [AN] Shy Ridley, OT [TA] Ftio Ryan, OT [MB] Joann Mariano, PT    Bed Mobility, Assessment/Treatment    Bed Mobility, Scoot/Bridge, [place] moderate assist (50% patient effort)  -AN      Bed Mob, Supine to Sit, [place] contact guard assist  -AN  not tested   Pt. UIC.  -MB    Bed Mobility, Comment  Pt UIC  -TA     Recorded by [AN] Shy Ridley, OT [TA] Fito Ryan, OT [MB] Joann Mariano, PT    Transfer Assessment/Treatment    Transfers, Bed-Chair [place] moderate assist (50% patient effort)  -AN      Transfers, Bed-Chair-Bed, Assist Device --   OT UE support  -AN      Transfers, Sit-Stand [place] minimum assist (75% patient effort)  -AN  minimum assist (75% patient effort);verbal cues required  -MB    Transfers, Stand-Sit [place] minimum assist (75% patient effort)  -AN  minimum assist (75% patient effort);verbal cues required  -MB    Transfers, Sit-Stand-Sit, Assist Device   rolling walker  -MB    Transfer, Safety Issues   step length decreased;weight-shifting ability decreased;balance decreased during turns;loses balance backward;impulsivity  -MB    Transfer, Impairments   strength decreased;impaired balance  -MB    Transfer, Comment  Pt declined d/t having just worked with PT  -TA VCs for safe hand placement, to push from armrest to stand and reach back prior to t/f.  During standing balance activities, patient stated, \"I'm gonna sit\" and began to sit abruptly before positioning in " front of chair, requiring assist to lower safely to sitting.   -MB    Recorded by [AN] Shy Ridley OT [TA] Fito Ryan OT [MB] Joann Mariano, PT    Gait Assessment/Treatment    Gait, [place] Level   minimum assist (75% patient effort);verbal cues required   chair in tow  -MB    Gait, Assistive Device   rolling walker  -MB    Gait, Distance (Feet)   10   Pt. declined further gait.  -MB    Gait, Gait Pattern Analysis   swing-to gait  -MB    Gait, Gait Deviations   ranjit decreased;forward flexed posture;bilateral:;step length decreased  -MB    Gait, Safety Issues   balance decreased during turns;step length decreased;weight-shifting ability decreased;supplemental O2  -MB    Gait, Impairments   strength decreased;impaired balance  -MB    Gait, Comment   Pt. amb slowly w/ VCs for upright posture and inc B step length.  Pt. amb 10 ft, then requested to sit, declining further gait.  -MB    Recorded by   [MB] Joann Mariano, PT    Toileting Assessment/Training    Toileting Assess/Train, Indepen Level maximum assist (25% patient effort)  -AN      Recorded by [AN] Shy Ridley OT      Balance Skills Training    Sitting-Level of Assistance Close supervision  -AN      Sitting # of Minutes 5  -AN      Standing-Level of Assistance Minimum assistance  -AN  Minimum assistance  -MB    Static Standing Balance Support Right upper extremity supported;Left upper extremity supported  -AN  assistive device  -MB    Standing-Balance Activities   Weight Shift R-L;Weight Shift A-P  -MB    Standing Balance # of Minutes   3  -MB    Recorded by [AN] Shy Ridley OT  [MB] Joann Mariano, PT    Therapy Exercises    Bilateral Lower Extremities   AROM:;10 reps;sitting;ankle pumps/circles;hip flexion;hip abduction/adduction;LAQ  -MB    Bilateral Upper Extremity 10 reps;AROM:;sitting  -AN AROM:;20 reps;sitting;hand pumps;elbow flexion/extension;shoulder circles;shoulder horizontal abd/add  -TA     BUE Resistance   manual resistance- minimal;other (comment)   with elbow, shld ext and shld horiz add.  -TA     Recorded by [AN] Shy Ridley, OT [TA] Fito Ryan, OT [MB] Joann Mariano, PT    Positioning and Restraints    Pre-Treatment Position in bed  -AN sitting in chair/recliner  -TA sitting in chair/recliner  -MB    Post Treatment Position chair  -AN chair  -TA chair  -MB    In Chair reclined;call light within reach;encouraged to call for assist;exit alarm on;with family/caregiver;notified nsg  -AN reclined;call light within reach;encouraged to call for assist;exit alarm on;with family/caregiver;legs elevated   all lines intact  -TA notified nsg;reclined;call light within reach;encouraged to call for assist;exit alarm on;with family/caregiver;legs elevated  -MB    Recorded by [AN] Shy Ridley, OT [TA] Fito Ryan, OT [MB] Joann Mariano, PT      User Key  (r) = Recorded By, (t) = Taken By, (c) = Cosigned By    Initials Name Effective Dates    AN Shy Ridley, OT 06/22/15 -     RORY Ryan, OT 03/14/16 -     GEORGES Mariano, PT 03/14/16 -                 OT Goals       01/05/17 1435 01/03/17 1608       Transfer Training OT LTG    Transfer Training OT LTG, Date Established  01/03/17  -AR     Transfer Training OT LTG, Time to Achieve  1 wk  -AR     Transfer Training OT LTG, Activity Type  sit to stand/stand to sit;toilet  -AR     Transfer Training OT LTG, [place] Level  verbal cues required;contact guard assist  -AR     Transfer Training OT LTG, Assist Device  commode, bedside;walker, rolling  -AR     Transfer Training OT LTG, Outcome goal ongoing   Pt declined fxl mobility this date d/t just having PT  -TA      Patient Education OT LTG    Patient Education OT LTG, Date Established  01/03/17  -AR     Patient Education OT LTG, Time to Achieve  1 wk  -AR     Patient Education OT LTG, Education Type  HEP;home safety;energy conservation;work simplification;adaptive breathing  -AR      Patient Education OT LTG, Education Understanding  demonstrates adequately;verbalizes understanding  -AR     Patient Education OT LTG Outcome goal ongoing   Progressing with HEP, adaptive breathing this date.  -TA      LB Dressing OT LTG    LB Dressing Goal OT LTG, Date Established  01/03/17  -AR     LB Dressing Goal OT LTG, Time to Achieve  by discharge  -AR     LB Dressing Goal OT LTG, Activity Type  Pt will complete LB dressing task   -AR     LB Dressing Goal OT LTG, [place] Level  verbal cues required;moderate assist (50% patient effort)  -AR     LB Dressing Goal OT LTG, Adaptive Equipment  --   AE PRN  -AR     LB Dressing Goal OT LTG, Outcome goal ongoing  -TA      UB Dressing OT LTG    UB Dressing Goal OT LTG, Date Established  01/03/17  -AR     UB Dressing Goal OT LTG, Time to Achieve  by discharge  -AR     UB Dressing Goal OT LTG, Activity Type  Pt will complete UB dressing task   -AR     UB Dressing Goal OT LTG, [place] Level  verbal cues required;supervision required  -AR     UB Dressing Goal OT LTG, Outcome goal ongoing  -TA        User Key  (r) = Recorded By, (t) = Taken By, (c) = Cosigned By    Initials Name Provider Type    AR Cristy Garcia, OT Occupational Therapist    RORY Ryan, OT Occupational Therapist          Occupational Therapy Education     Title: PT OT SLP Therapies (Active)     Topic: Occupational Therapy (Active)     Point: ADL training (Active)    Description: Instruct learner(s) on proper safety adaptation and remediation techniques during self care or transfers.   Instruct in proper use of assistive devices.    Learning Progress Summary    Learner Readiness Method Response Comment Documented by Status   Patient BERTHA Cantrell NR  DM 01/03/17 1634 Active    ELIOT Cantrell D VU,NR Education provided on ADL retraining, transfer traiing, bed mobility, goals of care AR 01/03/17 1607 Done   Family BERTHA Cantrell NR  DM 01/03/17 1634 Active    ELIOT Cantrell D VU,NR Education provided  on ADL retraining, transfer traiing, bed mobility, goals of care AR 01/03/17 1607 Done               Point: Home exercise program (Done)    Description: Instruct learner(s) on appropriate technique for monitoring, assisting and/or progressing therapeutic exercises/activities.    Learning Progress Summary    Learner Readiness Method Response Comment Documented by Status   Patient Acceptance E,D VU,DU,NR Educated on daily HEP and ADL participation benefits. AN 01/07/17 1118 Done    Acceptance E,TB,D VU,NR BUE HEP, adaptive breathing technique to assist with mgt of SOA. TA 01/05/17 1434 Done    Eager E,D NR  DM 01/03/17 1634 Active    Eager E,TB,D VU,NR Education provided on ADL retraining, transfer traiing, bed mobility, goals of care AR 01/03/17 1607 Done   Family Acceptance E,D VU,DU,NR Educated on daily HEP and ADL participation benefits. AN 01/07/17 1118 Done    Acceptance E,TB,D VU,NR BUE HEP, adaptive breathing technique to assist with mgt of SOA. TA 01/05/17 1434 Done    Eager E,D NR  DM 01/03/17 1634 Active    Eager E,TB,D VU,NR Education provided on ADL retraining, transfer traiing, bed mobility, goals of care AR 01/03/17 1607 Done               Point: Precautions (Active)    Description: Instruct learner(s) on prescribed precautions during self-care and functional transfers.    Learning Progress Summary    Learner Readiness Method Response Comment Documented by Status   Patient Eager E,D NR  DM 01/03/17 1634 Active    Eager E,TB,D VU,NR Education provided on ADL retraining, transfer traiing, bed mobility, goals of care AR 01/03/17 1607 Done   Family Eager E,D NR  DM 01/03/17 1634 Active    Eager E,TB,D VU,NR Education provided on ADL retraining, transfer traiing, bed mobility, goals of care AR 01/03/17 1607 Done               Point: Body mechanics (Done)    Description: Instruct learner(s) on proper positioning and spine alignment during self-care, functional mobility activities and/or exercises.    Learning  Progress Summary    Learner Readiness Method Response Comment Documented by Status   Patient Acceptance ANNIKAD NENO,DU,NR Educated on daily HEP and ADL participation benefits. AN 01/07/17 1118 Done    Eagreagan ED NR  DM 01/03/17 1634 Active    ELIOT Cantrell,BERTHA VU,NR Education provided on ADL retraining, transfer traiing, bed mobility, goals of care AR 01/03/17 1607 Done   Family Acceptance BERTHA ROBLERO,DU,NR Educated on daily HEP and ADL participation benefits. AN 01/07/17 1118 Done    Emmanuel ROBLEROD NR  DM 01/03/17 1634 Active    ELIOT Cantrell,D VU,NR Education provided on ADL retraining, transfer traiing, bed mobility, goals of care AR 01/03/17 1607 Done                      User Key     Initials Effective Dates Name Provider Type Discipline    DM 06/19/15 -  Kiera Bennett, PT Physical Therapist PT    AN 06/22/15 -  Shy Ridley, OT Occupational Therapist OT    AR 06/22/15 -  Cristy Garcia, OT Occupational Therapist OT    TA 03/14/16 -  Fito Ryan, OT Occupational Therapist OT                  OT Recommendation and Plan  Anticipated Discharge Disposition: skilled nursing facility  Therapy Frequency: daily (per priority policy)  Plan of Care Review  Plan Of Care Reviewed With: patient, family  Progress: progress toward functional goals is gradual  Outcome Summary/Follow up Plan: Pt motivated to do txfrs and ex this date. Pt verbalizes understanding of importance of therapeutic acitvities. [place] OT POC.        Outcome Measures       01/07/17 1044 01/05/17 1409 01/05/17 1320    How much help from another person do you currently need...    Turning from your back to your side while in flat bed without using bedrails?   3  -MB    Moving from lying on back to sitting on the side of a flat bed without bedrails?   3  -MB    Moving to and from a bed to a chair (including a wheelchair)?   3  -MB    Standing up from a chair using your arms (e.g., wheelchair, bedside chair)?   2  -MB    Climbing 3-5 steps with a railing?   1  -MB     To walk in hospital room?   3  -MB    AM-PAC 6 Clicks Score   15  -MB    How much help from another is currently needed...    Putting on and taking off regular lower body clothing? 2  -AN 2  -TA     Bathing (including washing, rinsing, and drying) 2  -AN 2  -TA     Toileting (which includes using toilet bed pan or urinal) 1  -AN 1  -TA     Putting on and taking off regular upper body clothing 3  -AN 3  -TA     Taking care of personal grooming (such as brushing teeth) 3  -AN 3  -TA     Eating meals 4  -AN 4  -TA     Score 15  -AN 15  -TA     Functional Assessment    Outcome Measure Options  AM-PAC 6 Clicks Daily Activity (OT)  -TA AM-PAC 6 Clicks Basic Mobility (PT)  -MB      User Key  (r) = Recorded By, (t) = Taken By, (c) = Cosigned By    Initials Name Provider Type    BELIA Ridley OT Occupational Therapist    RORY Ryan, OT Occupational Therapist    GEORGES Mariano, PT Physical Therapist           Time Calculation:         Time Calculation- OT       01/07/17 1121          Time Calculation- OT    OT Start Time 1044  -AN      Total Timed Code Minutes- OT 24 minute(s)  -AN      OT Received On 01/07/17  -AN      OT Goal Re-Cert Due Date 01/17/17  -AN        User Key  (r) = Recorded By, (t) = Taken By, (c) = Cosigned By    Initials Name Provider Type    BELIA Ridley OT Occupational Therapist           Therapy Charges for Today     Code Description Service Date Service Provider Modifiers Qty    [id number]  OT THERAPEUTIC ACT EA 15 MIN 1/7/2017 Shy Ridley OT GO 2               Shy Ridley OT  1/7/2017

## 2017-01-07 NOTE — PROGRESS NOTES
"[facility] Daily Note    01/07/17     LOS: 5 days   Patient Care Team:  Radha Pal MD as PCP - General (Internal Medicine)    Chief Complaint:  SOB/DHF    Subjective: The patient is currently somnolent but without complaints.  Her daughter is at her bedside and arrived from [place] this morning.  According to her daughters whenever she is tried to speak this morning a few words come out and then her mouth movements but no additional words are formed audibly.  According to the tach she is more somnolent this morning.    Review of Systems:   As above.    Medications:    aspirin 325 mg Oral Daily   brimonidine 1 drop Right Eye BID   bumetanide 1 mg Intravenous Daily   carvedilol 6.25 mg Oral BID With Meals   cetirizine 10 mg Oral Daily   doxycycline 100 mg Oral Q12H   DULoxetine 30 mg Oral Daily   FLUoxetine 40 mg Oral Daily   fluticasone 2 spray Nasal Daily   gabapentin 100 mg Oral Nightly   heparin (porcine) 5,000 Units Subcutaneous Q12H   lidocaine 1 patch Transdermal Q24H   pantoprazole 40 mg Oral Q AM   pharmacy consult - MTM  Does not apply Daily   piperacillin-tazobactam 3.375 g Intravenous Q6H   valsartan 40 mg Oral Q12H       Vital Sign Min/Max for last 24 hours  Temp  Min: 98.3 °F (36.8 °C)  Max: 98.4 °F (36.9 °C)   BP  Min: 110/72  Max: 140/64   Pulse  Min: 73  Max: 94   Resp  Min: 18  Max: 18   No Data Recorded   Flow (L/min)  Min: 3  Max: 4   Weight  Min: 103 lb (46.7 kg)  Max: 103 lb (46.7 kg)      Intake/Output Summary (Last 24 hours) at 01/07/17 1013  Last data filed at 01/07/17 1000   Gross per 24 hour   Intake    390 ml   Output      0 ml   Net    390 ml        Flowsheet Rows         First Filed Value    Admission Height  62\" (157.5 cm) Documented at 01/02/2017 1650    Admission Weight  98 lb (44.5 kg) Documented at 01/02/2017 1650          Physical Exam:    Neck: Jugular venous pressure is within normal limits. Carotids have normal upstrokes without bruits.   Cardiovascular: Heart " has a nondisplaced focal PMI. Regular rate and rhythm with 3/ 6 HSM LSB, no gallop or rub.  Lungs: Clear without rales or wheezes. Equal expansion is noted.  Breathing is shallow bilaterally  Abdomen: Soft, nontender.  Extremities: Show no edema.   Skin: warm and dry.    Results Review:    I reviewed the patient's new clinical results.      EKG: N/A    Tele: A Fib @ 84    Labs:      Results from last 7 days  Lab Units 01/06/17  0618 01/05/17  0622 01/04/17  0513  01/02/17  1658   SODIUM mmol/L 141 138 138  < > 141   POTASSIUM mmol/L 3.2* 3.7 3.2*  < > 4.5   CHLORIDE mmol/L 101 100 99  < > 102   TOTAL CO2 mmol/L 31.0 31.0 30.0  < > 29.0   BUN mg/dL 19 16 31*  < > 30*   CREATININE mg/dL 0.90 1.00 1.20  < > 1.10   CALCIUM mg/dL 9.5 9.2 8.8  < > 9.2   BILIRUBIN mg/dL  --   --   --   --  0.8   ALK PHOS U/L  --   --   --   --  148*   ALT (SGPT) U/L  --   --   --   --  14   AST (SGOT) U/L  --   --   --   --  25   GLUCOSE mg/dL 104* 100 89  < > 102*   < > = values in this interval not displayed.    Results from last 7 days  Lab Units 01/06/17  0618 01/05/17  1133 01/02/17  1658   WBC 10*3/mm3 10.05 9.38 5.73   HEMOGLOBIN g/dL 8.7* 8.3* 8.8*   HEMATOCRIT % 26.6* 26.1* 27.8*   PLATELETS 10*3/mm3 187 190 172     Lab Results   Component Value Date    TROPONINI 0.049 (H) 11/26/2016    TROPONINI 0.051 (H) 11/26/2016    TROPONINI 0.058 (H) 11/25/2016     Lab Results   Component Value Date    CHOL 133 11/24/2016     Lab Results   Component Value Date    TRIG 83 11/24/2016     Lab Results   Component Value Date    HDL 54 11/24/2016     No results found for: LDLCALC  Lab Results   Component Value Date    INR 1.03 11/29/2016    INR 0.98 11/24/2016    INR 1.0 11/23/2016    PROTIME 11.2 11/29/2016    PROTIME 10.7 11/24/2016    PROTIME 11.5 (L) 11/23/2016         Ejection Fraction: LVEF 64 % per Echo 11/23/16    Problem List:    Principal Problem:  Acute diastolic (congestive) heart failure  Hypertension  Atrial fibrillation, no  anticoags due to hx GIB and fall risk  Pacemaker at end of battery life  Pulmonary hypertension  Acute hypoxic respiratory failure  Healthcare Associated Pneumonia  Valvular heart disease    Plan:    She is very somnolent this morning.  She has apparently been very weak with her speech saying just a few words and then moving her mouth but no words come out according to her daughter who arrived from [place] this morning.  Although she is a DO NOT RESUSCITATE the family wants to remain active and correcting any problems possible.  We discussed the possibility of hypercarbia as a source for her somnolence.  Her heart rate has ranged between 78 and 85 bpm most of the time    The patient's lorazepam was discontinued.  An ABG will be ordered.      Rosalia Wood RN, BSN  01/07/17  10:13 AM     I, Corrina Hilario MD, have reviewed the note in full and agree with all aspects of the above including physical exam, assessment, labs and plan with changes made accordingly.    Corrina Hilario MD, FACC

## 2017-01-07 NOTE — PLAN OF CARE
Problem: Patient Care Overview (Adult)  Goal: Plan of Care Review  Outcome: Ongoing (interventions implemented as appropriate)    01/07/17 1118   Coping/Psychosocial Response Interventions   Plan Of Care Reviewed With patient;family   Patient Care Overview   Progress progress toward functional goals is gradual   Outcome Evaluation   Outcome Summary/Follow up Plan Pt motivated to do txfrs and ex this date. Pt verbalizes understanding of importance of therapeutic acitvities. [place] OT POC.         Problem: Inpatient Occupational Therapy  Goal: Transfer Training Goal 1 LTG- OT  Outcome: Ongoing (interventions implemented as appropriate)    01/03/17 1608 01/05/17 1435   Transfer Training OT LTG   Transfer Training OT LTG, Date Established 01/03/17 --    Transfer Training OT LTG, Time to Achieve 1 wk --    Transfer Training OT LTG, Activity Type sit to stand/stand to sit;toilet --    Transfer Training OT LTG, [place] Level verbal cues required;contact guard assist --    Transfer Training OT LTG, Assist Device commode, bedside;walker, rolling --    Transfer Training OT LTG, Outcome --  goal ongoing  (Pt declined fxl mobility this date d/t just having PT)       Goal: Patient Education Goal LTG- OT  Outcome: Ongoing (interventions implemented as appropriate)    01/03/17 1608 01/05/17 1435   Patient Education OT LTG   Patient Education OT LTG, Date Established 01/03/17 --    Patient Education OT LTG, Time to Achieve 1 wk --    Patient Education OT LTG, Education Type HEP;home safety;energy conservation;work simplification;adaptive breathing --    Patient Education OT LTG, Education Understanding demonstrates adequately;verbalizes understanding --    Patient Education OT LTG Outcome --  goal ongoing  (Progressing with HEP, adaptive breathing this date.)       Goal: LB Dressing Goal LTG- OT  Outcome: Ongoing (interventions implemented as appropriate)    01/03/17 1608 01/05/17 1435   LB Dressing OT LTG   LB Dressing Goal  OT LTG, Date Established 01/03/17 --    LB Dressing Goal OT LTG, Time to Achieve by discharge --    LB Dressing Goal OT LTG, Activity Type Pt will complete LB dressing task  --    LB Dressing Goal OT LTG, [place] Level verbal cues required;moderate assist (50% patient effort) --    LB Dressing Goal OT LTG, Adaptive Equipment (AE PRN) --    LB Dressing Goal OT LTG, Outcome --  goal ongoing       Goal: UB Dressing Goal LTG- OT  Outcome: Ongoing (interventions implemented as appropriate)    01/03/17 1608 01/05/17 1435   UB Dressing OT LTG   UB Dressing Goal OT LTG, Date Established 01/03/17 --    UB Dressing Goal OT LTG, Time to Achieve by discharge --    UB Dressing Goal OT LTG, Activity Type Pt will complete UB dressing task  --    UB Dressing Goal OT LTG, [place] Level verbal cues required;supervision required --    UB Dressing Goal OT LTG, Outcome --  goal ongoing

## 2017-01-07 NOTE — PROGRESS NOTES
[facility] Medicine Services  INPATIENT PROGRESS NOTE    Date of Admission: 1/2/2017  Length of Stay: 5  Primary Care Physician: Radha Pal MD    Subjective     Chief Complaint: bilateral infiltrates  HPI:  Daughter present.  Earlier today was very lethargic, but has perked up son in last couple hours.  Ate a little bit.  Still coughing some, but overall improved from admission.  Remains confused.    Review Of Systems:   Review of Systems   Reason unable to perform ROS: patient confusion.     Objective      Temp:  [98.3 °F (36.8 °C)-98.4 °F (36.9 °C)] 98.4 °F (36.9 °C)  Heart Rate:  [79-94] 79  Resp:  [18] 18  BP: (107-140)/(8-75) 107/75  Physical Exam    Frail and weak, but no acute distress  irreg irrg  abd soft, non tender non distended  Some bilateral rhonchi  No LE edema  Neuro - awake, confused, follows commands, and no focal deficits.    Results Review:    I have reviewed the labs, radiology results and diagnostic studies.      Results from last 7 days  Lab Units 01/06/17  0618   WBC 10*3/mm3 10.05   HEMOGLOBIN g/dL 8.7*   PLATELETS 10*3/mm3 187       Results from last 7 days  Lab Units 01/07/17  1602 01/06/17  0618   SODIUM mmol/L  --  141   POTASSIUM mmol/L 2.9* 3.2*   TOTAL CO2 mmol/L  --  31.0   CREATININE mg/dL  --  0.90   GLUCOSE mg/dL  --  104*       Culture Data:   BLOOD CULTURE   Date Value Ref Range Status   01/02/2017 No growth at 2 days  Preliminary   01/02/2017 No growth at 2 days  Preliminary     Radiology Data:     I have reviewed the medications.    aspirin 325 mg Oral Daily   brimonidine 1 drop Right Eye BID   bumetanide 1 mg Intravenous Daily   carvedilol 6.25 mg Oral BID With Meals   cetirizine 10 mg Oral Daily   doxycycline 100 mg Oral Q12H   DULoxetine 30 mg Oral Daily   FLUoxetine 40 mg Oral Daily   fluticasone 2 spray Nasal Daily   gabapentin 100 mg Oral Nightly   heparin (porcine) 5,000 Units Subcutaneous Q12H   lidocaine 1 patch Transdermal Q24H  "  pantoprazole 40 mg Oral Q AM   pharmacy consult - MTM  Does not apply Daily   piperacillin-tazobactam 3.375 g Intravenous Q6H   valsartan 40 mg Oral Q12H       Assessment/Plan     Assessment/Problem List  Principal Problem:    Acute diastolic (congestive) heart failure  Active Problems:    Atrial fibrillation, no anticoags due to hx GIB and fall risk    Hypertension    Pacemaker at end of battery life    Pulmonary hypertension    Acute hypoxic respiratory failure    Healthcare Associated Pneumonia    Valvular heart disease    Hypokalemia    Chronic anemia    Plan  Acute on Chronic Resp failure  - multifactorial, but seems most c/w volume overload  - CHF/PNA     - B/L infitrates on cxr - more consistent with CHF based on improvement with diuresis.  Can't exclude PNA, will complete 7d course empiric abx.    A/C diastolic CHF  --cards following  - check CXR in AM, consider increasing bumex if needed    Anemia    - related to recent GI bleed, stable     PPM at end of battery life    Valvular Heart Dz  - not a surgical candidate    Dispo:  I discussed with dtr at bedside. Goal is going to be to get \"tuned up\" as much as possible, but she understands that patient will likely continue to decline given age and current condition.  She seems interested in palliative following at SNF and perhaps focusing on comfort and preventing readmissions if possible.      DVT prophylaxis: mechanical    High complexity.    Victor Manuel Russ MD   01/07/17   6:36 PM    Please note that portions of this note may have been completed with a voice recognition program. Efforts were made to edit the dictations, but occasionally words are mistranscribed.    "

## 2017-01-07 NOTE — PLAN OF CARE
Problem: Fall Risk (Adult)  Goal: Identify Related Risk Factors and Signs and Symptoms  Outcome: Outcome(s) achieved Date Met:  01/07/17    Problem: Skin Integrity Impairment, Risk/Actual (Adult)  Goal: Identify Related Risk Factors and Signs and Symptoms  Outcome: Outcome(s) achieved Date Met:  01/07/17

## 2017-01-07 NOTE — PLAN OF CARE
Problem: Fall Risk (Adult)  Goal: Absence of Falls  Outcome: Ongoing (interventions implemented as appropriate)    Problem: Cardiac: Heart Failure (Adult)  Goal: Signs and Symptoms of Listed Potential Problems Will be Absent or Manageable (Cardiac: Heart Failure)  Outcome: Ongoing (interventions implemented as appropriate)    Problem: Patient Care Overview (Adult)  Goal: Plan of Care Review  Outcome: Ongoing (interventions implemented as appropriate)    Problem: Skin Integrity Impairment, Risk/Actual (Adult)  Goal: Skin Integrity/Wound Healing  Outcome: Ongoing (interventions implemented as appropriate)

## 2017-01-07 NOTE — PROGRESS NOTES
[facility] Medicine Services  INPATIENT PROGRESS NOTE    Date of Admission: 1/2/2017  Length of Stay: 4  Primary Care Physician: Radha Pal MD    Subjective     Chief Complaint: bilateral infiltrates  HPI:    Breathing less labored. Minimal cough. Remains fatigued and sluggish     Review Of Systems:   Review of Systems   Reason unable to perform ROS: patient confusion.         Objective      Temp:  [98.1 °F (36.7 °C)-98.4 °F (36.9 °C)] 98.4 °F (36.9 °C)  Heart Rate:  [73] 73  Resp:  [18] 18  BP: (107-125)/(64-73) 110/72  Physical Exam    Frail  Appears fatigued  will briefly answer questions   irreg irrg  diminsed at bases bilaterally  abd soft, non tender non distended  Trace lower ext edema  Results Review:    I have reviewed the labs, radiology results and diagnostic studies.      Results from last 7 days  Lab Units 01/06/17  0618   WBC 10*3/mm3 10.05   HEMOGLOBIN g/dL 8.7*   PLATELETS 10*3/mm3 187       Results from last 7 days  Lab Units 01/06/17  0618   SODIUM mmol/L 141   POTASSIUM mmol/L 3.2*   TOTAL CO2 mmol/L 31.0   CREATININE mg/dL 0.90   GLUCOSE mg/dL 104*       Culture Data:   BLOOD CULTURE   Date Value Ref Range Status   01/02/2017 No growth at 2 days  Preliminary   01/02/2017 No growth at 2 days  Preliminary     Radiology Data:     I have reviewed the medications.    aspirin 325 mg Oral Daily   brimonidine 1 drop Right Eye BID   bumetanide 1 mg Intravenous Daily   carvedilol 6.25 mg Oral BID With Meals   cetirizine 10 mg Oral Daily   doxycycline 100 mg Oral Q12H   DULoxetine 30 mg Oral Daily   FLUoxetine 40 mg Oral Daily   fluticasone 2 spray Nasal Daily   gabapentin 100 mg Oral Nightly   heparin (porcine) 5,000 Units Subcutaneous Q12H   lidocaine 1 patch Transdermal Q24H   pantoprazole 40 mg Oral Q AM   [START ON 1/7/2017] pharmacy consult - MTM  Does not apply Daily   piperacillin-tazobactam 3.375 g Intravenous Q6H   valsartan 40 mg Oral Q12H       Assessment/Plan      Assessment/Problem List  Principal Problem:    Acute diastolic (congestive) heart failure  Active Problems:    Hypertension    Atrial fibrillation, no anticoags due to hx GIB and fall risk    Pacemaker at end of battery life    Pulmonary hypertension    Acute hypoxic respiratory failure    Healthcare Associated Pneumonia    Valvular heart disease    I/O last 3 completed shifts:  In: 860 [P.O.:660; IV Piggyback:200]  Out: -   I/O this shift:  In: 50 [IV Piggyback:50]  Out: -              Plan  Acute on Chronic Resp failure  - multifactorial  - CHF/PNA     B/L infitrates on cxr - may represent both CHF and pneumonia, but suspect largest compontent volume. Continue empric abx, cxr reviewed and clearing on subsequent scan    CHF  --cards following  --strict I/O  --continue coreg, arb, bumex given IV Daily. Change to oral likely tomorrow    Anemia    - not sure if patient is good endoscopy candidate, but we could certainly try iron infusion/replacement if needed     Palliative consult noted, appears appropriate.    PPM at end of battery life    Valvular Heart Dz  - not a surgical candidate      DVT prophylaxis:  Discharge Planning: I expect patient to be discharged to SNF in 2-3 days.  Pt is from [facility], daugther hopeful patient will return there but is aware patient may not regain sufficient strength to remain semi-independent.    Daniel Valdez MD   01/06/17   11:26 PM    Please note that portions of this note may have been completed with a voice recognition program. Efforts were made to edit the dictations, but occasionally words are mistranscribed.

## 2017-01-08 NOTE — PLAN OF CARE
Problem: Patient Care Overview (Adult)  Goal: Adult Individualization and Mutuality  Outcome: Outcome(s) achieved Date Met:  01/08/17

## 2017-01-08 NOTE — PLAN OF CARE
Problem: Patient Care Overview (Adult)  Goal: Plan of Care Review  Outcome: Ongoing (interventions implemented as appropriate)    01/08/17 1358   Coping/Psychosocial Response Interventions   Plan Of Care Reviewed With patient;family   Patient Care Overview   Progress declining   Outcome Evaluation   Outcome Summary/Follow up Plan pt confused,stood but refused to try to take steps,family did not want to push pt.         Problem: Inpatient Physical Therapy  Goal: Bed Mobility Goal LTG- PT  Outcome: Ongoing (interventions implemented as appropriate)    01/04/17 1042 01/08/17 1358   Bed Mobility PT LTG   Bed Mobility PT LTG, Date Established 01/03/17 --    Bed Mobility PT LTG, Time to Achieve 1 wk --    Bed Mobility PT LTG, Activity Type all bed mobility --    Bed Mobility PT LTG, [place] Level independent --    Bed Mobility PT LTG, Outcome --  goal ongoing       Goal: Transfer Training Goal 1 LTG- PT  Outcome: Ongoing (interventions implemented as appropriate)    01/04/17 1042 01/08/17 1358   Transfer Training PT LTG   Transfer Training PT LTG, Date Established 01/03/17 --    Transfer Training PT LTG, Time to Achieve 1 wk --    Transfer Training PT LTG, Activity Type sit to stand/stand to sit;bed to chair /chair to bed;toilet --    Transfer Training PT LTG, [place] Level supervision required --    Transfer Training PT LTG, Assist Device walker, rolling --    Transfer Training PT LTG, Outcome --  goal ongoing       Goal: Gait Training Goal LTG- PT  Outcome: Ongoing (interventions implemented as appropriate)    01/04/17 1042 01/08/17 1358   Gait Training PT LTG   Gait Training Goal PT LTG, Date Established 01/03/17 --    Gait Training Goal PT LTG, Time to Achieve 1 wk --    Gait Training Goal PT LTG, [place] Level contact guard assist --    Gait Training Goal PT LTG, Assist Device walker, rolling --    Gait Training Goal PT LTG, Distance to Achieve 30 --    Gait Training Goal PT LTG, Outcome --  goal  ongoing

## 2017-01-08 NOTE — PLAN OF CARE
Problem: Patient Care Overview (Adult)  Goal: Plan of Care Review  Outcome: Ongoing (interventions implemented as appropriate)    01/07/17 1201   Coping/Psychosocial Response Interventions   Plan Of Care Reviewed With patient;daughter   Patient Care Overview   Progress declining   Outcome Evaluation   Outcome Summary/Follow up Plan Daughters feels she is lethargic, she wakes up and talks, eating a little, she is tired, confused, coughing when she drinks

## 2017-01-08 NOTE — PROGRESS NOTES
[facility] Medicine Services  INPATIENT PROGRESS NOTE    Date of Admission: 1/2/2017  Length of Stay: 6  Primary Care Physician: Radha Pal MD    Subjective     Chief Complaint: bilateral infiltrates  HPI:  Daughter present.  Continues to have periods of confusion.  She says breathing is about the same.    Review Of Systems:   Review of Systems   Reason unable to perform ROS: patient confusion.     Objective      Temp:  [98 °F (36.7 °C)-98.4 °F (36.9 °C)] 98 °F (36.7 °C)  Heart Rate:  [79-83] 83  Resp:  [18] 18  BP: (105-107)/(65-75) 105/65  Physical Exam    Frail and weak, but no acute distress, currently awake and some mild confusion  irreg irrg  abd soft, non tender non distended  Some bilateral rhonchi and rales, about the same  No LE edema  Neuro - awake, confused, follows commands, and no focal deficits.    Results Review:    I have reviewed the labs, radiology results and diagnostic studies.      Results from last 7 days  Lab Units 01/08/17  0736   WBC 10*3/mm3 11.40*   HEMOGLOBIN g/dL 9.3*   PLATELETS 10*3/mm3 197       Results from last 7 days  Lab Units 01/08/17  0736   SODIUM mmol/L 141   POTASSIUM mmol/L 4.7   TOTAL CO2 mmol/L 34.0*   CREATININE mg/dL 1.00   GLUCOSE mg/dL 88       Culture Data:   BLOOD CULTURE   Date Value Ref Range Status   01/02/2017 No growth at 2 days  Preliminary   01/02/2017 No growth at 2 days  Preliminary     Radiology Data:     I have reviewed the medications.    aspirin 325 mg Oral Daily   brimonidine 1 drop Right Eye BID   bumetanide 1 mg Intravenous Q12H   carvedilol 6.25 mg Oral BID With Meals   cetirizine 10 mg Oral Daily   doxycycline 100 mg Oral Q12H   DULoxetine 30 mg Oral Daily   FLUoxetine 40 mg Oral Daily   fluticasone 2 spray Nasal Daily   gabapentin 100 mg Oral Nightly   heparin (porcine) 5,000 Units Subcutaneous Q12H   lidocaine 1 patch Transdermal Q24H   pantoprazole 40 mg Oral Q AM   pharmacy consult - MTM  Does not apply Daily  "  piperacillin-tazobactam 3.375 g Intravenous Q6H   valsartan 40 mg Oral Q12H     CXR (1/8 my read) - worsening edema/effusions    Assessment/Plan     Assessment/Problem List  Principal Problem:    Acute diastolic (congestive) heart failure  Active Problems:    Atrial fibrillation, no anticoags due to hx GIB and fall risk    Hypertension    Pacemaker at end of battery life    Pulmonary hypertension    Acute hypoxic respiratory failure    Healthcare Associated Pneumonia    Valvular heart disease    Hypokalemia    Chronic anemia    Plan  Acute on Chronic Resp failure  - multifactorial, but seems most c/w volume overload  - CHF/PNA     - B/L infitrates on cxr - more consistent with CHF based on improvement with diuresis.  Can't exclude PNA, will complete 7d course empiric abx.    A/C diastolic CHF  --cards following  - CXR looks worse to me today, will increase bumex to bid  - may have to tolerate worsening renal function for breathing comfort    Anemia    - related to recent GI bleed, stable     PPM at end of battery life    Valvular Heart Dz  - not a surgical candidate    Dispo:  I discussed with dtr at bedside. Goal is going to be to get \"tuned up\" as much as possible, but she understands that patient will likely continue to decline given age and current condition.  She seems interested in palliative following at SNF and perhaps focusing on comfort and preventing readmissions if possible.  Other daughter discussed with today, she is in agreement.    DVT prophylaxis: mechanical    High complexity.    Victor Manuel Russ MD   01/08/17   2:22 PM    Please note that portions of this note may have been completed with a voice recognition program. Efforts were made to edit the dictations, but occasionally words are mistranscribed.    "

## 2017-01-08 NOTE — PROGRESS NOTES
"[facility] Daily Note       LOS: 6 days   Patient Care Team:  Radha Pal MD as PCP - General (Internal Medicine)    Chief Complaint:  SOB/ Diastolic heart failure    Subjective: No complaints except mucous drainage.  Much more alert today.      Review of Systems:   As above.    Medications:    aspirin 325 mg Oral Daily   brimonidine 1 drop Right Eye BID   bumetanide 1 mg Intravenous Daily   carvedilol 6.25 mg Oral BID With Meals   cetirizine 10 mg Oral Daily   doxycycline 100 mg Oral Q12H   DULoxetine 30 mg Oral Daily   FLUoxetine 40 mg Oral Daily   fluticasone 2 spray Nasal Daily   gabapentin 100 mg Oral Nightly   heparin (porcine) 5,000 Units Subcutaneous Q12H   lidocaine 1 patch Transdermal Q24H   pantoprazole 40 mg Oral Q AM   pharmacy consult - MTM  Does not apply Daily   piperacillin-tazobactam 3.375 g Intravenous Q6H   valsartan 40 mg Oral Q12H       Vital Sign Min/Max for last 24 hours  Temp  Min: 98 °F (36.7 °C)  Max: 98.4 °F (36.9 °C)   BP  Min: 105/65  Max: 107/75   Pulse  Min: 79  Max: 83   Resp  Min: 18  Max: 18   SpO2  Min: 98 %  Max: 98 %   Flow (L/min)  Min: 3  Max: 4   Weight  Min: 115 lb (52.2 kg)  Max: 115 lb (52.2 kg)      Intake/Output Summary (Last 24 hours) at 01/08/17 1019  Last data filed at 01/08/17 0920   Gross per 24 hour   Intake    320 ml   Output      0 ml   Net    320 ml        Flowsheet Rows         First Filed Value    Admission Height  62\" (157.5 cm) Documented at 01/02/2017 1650    Admission Weight  98 lb (44.5 kg) Documented at 01/02/2017 1650          Physical Exam:      Cardiovascular: Heart has a nondisplaced focal PMI. Regular rate and rhythm with 3/ 6 HSM LSB, no gallop or rub.  Lungs: Few rhonchi on exam. Equal expansion is noted.  Breathing is shallow bilaterally  Abdomen: Soft, nontender.  Extremities: Show no edema.   Skin: warm and dry     Results Review:    I reviewed the patient's new clinical results.    Tele:  A-fib @ 84    Labs:  CXR today " better    Results from last 7 days  Lab Units 01/08/17  0736 01/07/17  1602 01/06/17  0618 01/05/17  0622  01/02/17  1658   SODIUM mmol/L 141  --  141 138  < > 141   POTASSIUM mmol/L 4.7 2.9* 3.2* 3.7  < > 4.5   CHLORIDE mmol/L 100  --  101 100  < > 102   TOTAL CO2 mmol/L 34.0*  --  31.0 31.0  < > 29.0   BUN mg/dL 21  --  19 16  < > 30*   CREATININE mg/dL 1.00  --  0.90 1.00  < > 1.10   CALCIUM mg/dL 9.8  --  9.5 9.2  < > 9.2   BILIRUBIN mg/dL  --   --   --   --   --  0.8   ALK PHOS U/L  --   --   --   --   --  148*   ALT (SGPT) U/L  --   --   --   --   --  14   AST (SGOT) U/L  --   --   --   --   --  25   GLUCOSE mg/dL 88  --  104* 100  < > 102*   < > = values in this interval not displayed.    Results from last 7 days  Lab Units 01/08/17  0736 01/06/17  0618 01/05/17  1133   WBC 10*3/mm3 11.40* 10.05 9.38   HEMOGLOBIN g/dL 9.3* 8.7* 8.3*   HEMATOCRIT % 29.0* 26.6* 26.1*   PLATELETS 10*3/mm3 197 187 190     Lab Results   Component Value Date    TROPONINI 0.049 (H) 11/26/2016    TROPONINI 0.051 (H) 11/26/2016    TROPONINI 0.058 (H) 11/25/2016     Lab Results   Component Value Date    CHOL 133 11/24/2016     Lab Results   Component Value Date    TRIG 83 11/24/2016     Lab Results   Component Value Date    HDL 54 11/24/2016     No results found for: LDLCALC  Lab Results   Component Value Date    INR 1.03 11/29/2016    INR 0.98 11/24/2016    INR 1.0 11/23/2016    PROTIME 11.2 11/29/2016    PROTIME 10.7 11/24/2016    PROTIME 11.5 (L) 11/23/2016         Ejection Fraction:  LVEF 64 % per Echo 11/23/16      Assessment:  1.  Acute diastolic (congestive) heart failure   -Echo 11/2016  2.  Hypertension- controlled  3.  Atrial fibrillation   -no anticoags due to hx GIB and fall risk   -CHADS-VASc = 5  4.  Pacemaker at end of battery life  5.  Pulmonary hypertension   -Mod to Severe TR with RVSP 76 mmHg per Echo 11/2016  6.  Acute hypoxic respiratory failure  7.  Healthcare Associated Pneumonia  8.  Valvular heart  disease    -mild AR, mild to mod MR      Plan:    Continue current care.    Paris Garber, APRN  01/08/17  10:19 AM    I, Corrina Hilario MD, have reviewed the note in full and agree with all aspects of the above including physical exam, assessment, labs and plan with changes made accordingly.    Corrina Hilario MD, FACC

## 2017-01-09 NOTE — PLAN OF CARE
Problem: Fall Risk (Adult)  Goal: Absence of Falls  Outcome: Ongoing (interventions implemented as appropriate)    01/09/17 1707   Fall Risk (Adult)   Absence of Falls making progress toward outcome         Problem: Cardiac: Heart Failure (Adult)  Goal: Signs and Symptoms of Listed Potential Problems Will be Absent or Manageable (Cardiac: Heart Failure)  Outcome: Ongoing (interventions implemented as appropriate)    01/09/17 1707   Cardiac: Heart Failure   Problems Assessed (Heart Failure) all   Problems Present (Heart Failure) situational response         Problem: Patient Care Overview (Adult)  Goal: Plan of Care Review  Outcome: Ongoing (interventions implemented as appropriate)    01/09/17 1707   Coping/Psychosocial Response Interventions   Plan Of Care Reviewed With patient;daughter;son   Patient Care Overview   Progress progress toward functional goals as expected       Goal: Discharge Needs Assessment  Outcome: Ongoing (interventions implemented as appropriate)    01/03/17 0947 01/03/17 1430 01/07/17 0357   Discharge Needs Assessment   Concerns To Be Addressed --  --  basic needs concerns;cognitive/perceptual concerns;discharge planning concerns   Readmission Within The Last 30 Days --  --  no previous admission in last 30 days   Equipment Needed After Discharge --  --  walker, rolling;wheelchair   Discharge Facility/Level Of Care Needs --  --  nursing facility, skilled   Current Discharge Risk --  --  dependent with mobility/activities of daily living   Discharge Disposition --  --  still a patient;skilled nursing facility   Discharge Planning Comments Pt is from assisted living. She will need to be back to her baseline to return. --  --    Current Health   Anticipated Changes Related to Illness --  --  inability to care for self   Living Environment   Transportation Available --  car;family or friend will provide --    Self-Care   Equipment Currently Used at Home --  wheelchair;walker, rolling;cane,  dayron  (mary mendoza) --          Problem: Skin Integrity Impairment, Risk/Actual (Adult)  Goal: Skin Integrity/Wound Healing  Outcome: Ongoing (interventions implemented as appropriate)    01/09/17 2610   Skin Integrity Impairment, Risk/Actual (Adult)   Skin Integrity/Wound Healing making progress toward outcome

## 2017-01-09 NOTE — PLAN OF CARE
Problem: Patient Care Overview (Adult)  Goal: Plan of Care Review  Outcome: Ongoing (interventions implemented as appropriate)    01/09/17 1005   Coping/Psychosocial Response Interventions   Plan Of Care Reviewed With patient   Patient Care Overview   Progress no change   Outcome Evaluation   Outcome Summary/Follow up Plan Pt tolerated session well. Pt completed ther ex with good effort with multiple cues to complete. Pt to benefit from cont skilled therapy to address deficits and to reach therapy goals for safe progression toward PLOF.         Problem: Inpatient Physical Therapy  Goal: Bed Mobility Goal LTG- PT  Outcome: Ongoing (interventions implemented as appropriate)    01/04/17 1042 01/09/17 1005   Bed Mobility PT LTG   Bed Mobility PT LTG, Date Established 01/03/17 --    Bed Mobility PT LTG, Time to Achieve 1 wk --    Bed Mobility PT LTG, Activity Type all bed mobility --    Bed Mobility PT LTG, [place] Level independent --    Bed Mobility PT LTG, Date Goal Reviewed --  01/09/17   Bed Mobility PT LTG, Outcome --  goal ongoing       Goal: Transfer Training Goal 1 LTG- PT  Outcome: Ongoing (interventions implemented as appropriate)  Goal: Gait Training Goal LTG- PT  Outcome: Ongoing (interventions implemented as appropriate)    01/04/17 1042 01/09/17 1005   Gait Training PT LTG   Gait Training Goal PT LTG, Date Established 01/03/17 --    Gait Training Goal PT LTG, Time to Achieve 1 wk --    Gait Training Goal PT LTG, [place] Level contact guard assist --    Gait Training Goal PT LTG, Assist Device walker, rolling --    Gait Training Goal PT LTG, Distance to Achieve 30 --    Gait Training Goal PT LTG, Date Goal Reviewed --  01/09/17   Gait Training Goal PT LTG, Outcome --  goal ongoing

## 2017-01-09 NOTE — PLAN OF CARE
Problem: Patient Care Overview (Adult)  Goal: Plan of Care Review  Outcome: Ongoing (interventions implemented as appropriate)    01/08/17 1800   Coping/Psychosocial Response Interventions   Plan Of Care Reviewed With patient   Patient Care Overview   Progress no change   Outcome Evaluation   Outcome Summary/Follow up Plan Patient states she was scared today, she is talking to her daughter and grand daughters, states she wants to stay here, states we will change her diaper, she is eating better, gets confused at times,

## 2017-01-09 NOTE — SIGNIFICANT NOTE
13:00   Palliative Team Member Meeting  Attendance:    Dr. Rocco Plata, SHAGUFTA Armstrong, ACHPN  Arminda Britt, RN, PN, Director  Soni Hearn, RN, PN  BRAVO JeffriesW, [facility]-

## 2017-01-09 NOTE — PROGRESS NOTES
"Adult Nutrition  Assessment/PES    Patient Name:  Avril Allen  YOB: 1921  MRN: [id number]  Admit Date:  1/2/2017    Assessment Date:  1/9/2017        Reason for Assessment       01/09/17 1039    Reason for Assessment    Reason For Assessment/Visit follow up protocol    Time Spent (min) 20              Nutrition/Diet History       01/09/17 1040    Nutrition/Diet History    Reported/Observed By Patient;Family    Other Pt was in and out of sleep but son stated that her appetite has been improving            Anthropometrics       01/09/17 1044    Anthropometrics    Height 157.5 cm (62\")    Weight 52.1 kg (114 lb 13.8 oz)    Ideal Body Weight (IBW)    Ideal Body Weight (IBW), Female 50.83    % Ideal Body Weight 102.71    Body Mass Index (BMI)    BMI (kg/m2) 21.05            Labs/Tests/Procedures/Meds       01/09/17 1044    Labs/Tests/Procedures/Meds    Labs/Tests Review Reviewed                Nutrition Prescription Ordered       01/09/17 1045    Nutrition Prescription PO    Current PO Diet Soft Texture    Texture Whole foods    Supplement Boost Plus    Supplement Frequency 2 times a day    Common Modifiers Cardiac;Low Sodium            Evaluation of Received Nutrient/Fluid Intake       01/09/17 1045    PO Evaluation    Number of Meals 4    % PO Intake 38              Problem/Interventions:        Problem 1       01/09/17 1049    Nutrition Diagnoses Problem 1    Problem 1 Inadequate Intake/Infusion    Inadequate Intake Type --    Etiology (related to) Medical Diagnosis   Per notes this admission    Signs/Symptoms (evidenced by) PO Intake    Percent (%) intake recorded 38 %    Over number of meals 4                    Intervention Goal       01/09/17 1055    Intervention Goal    General Nutrition support treatment    PO Increase intake            Nutrition Intervention       01/09/17 1055    Nutrition Intervention    RD/Tech Action Advise alternate selection;Encourage intake;Follow Tx progress            "   Education/Evaluation       01/09/17 1056    Monitor/Evaluation    Monitor Per protocol        Comments:      Electronically signed by:  Kayla Miranda  01/09/17 2:23 PM

## 2017-01-09 NOTE — PROGRESS NOTES
Continued Stay Note  [facility]     Patient Name: Avril Allen  MRN: [id number]  Today's Date: 1/9/2017    Admit Date: 1/2/2017          Discharge Plan       01/09/17 1125    Case Management/Social Work Plan    Plan SNF    Patient/Family In Agreement With Plan yes    Additional Comments I spoke with the son and daughter. Pt will need SNF at [place]. I called the referal to Mamie at [facility] to [facility]. Pt has been there before.              Discharge Codes     None        Expected Discharge Date and Time     Expected Discharge Date Expected Discharge Time    Jan 6, 2017 10:29 AM            Haily Shoemaker RN

## 2017-01-09 NOTE — PROGRESS NOTES
Acute Care - Physical Therapy Progress Note  [facility]     Patient Name: Avril Allen  : 1921  MRN: [id number]  Today's Date: 2017  Onset of Illness/Injury or Date of Surgery Date: 17  Date of Referral to PT: 17  Referring Physician: Jackelin Medley MD    Admit Date: 2017    Visit Dx:    ICD-10-CM ICD-9-CM   1. Acute diastolic (congestive) heart failure I50.31 428.31     428.0   2. Bronchospasm J98.01 519.11   3. Acute respiratory failure with hypoxia J96.01 518.81   4. Community acquired pneumonia J18.9 486   5. Impaired mobility and ADLs Z74.09 799.89   6. Impaired functional mobility, balance, gait, and endurance Z74.09 V49.89   7. Chronic atrial fibrillation I48.2 427.31   8. Essential hypertension I10 401.9     Patient Active Problem List   Diagnosis   • Hypertension   • Arthritis   • Altered mental status   • Transient cerebral ischemia   • Atrial fibrillation, no anticoags due to hx GIB and fall risk   • Pacemaker at end of battery life   • Pacemaker-dependent due to native cardiac rhythm insufficient to support life   • Failure to thrive in adult   • Acute blood loss anemia   • GI bleed   • Acute diastolic (congestive) heart failure   • Pulmonary hypertension   • Acute hypoxic respiratory failure   • Healthcare Associated Pneumonia   • Valvular heart disease   • Hypokalemia   • Chronic anemia               Adult Rehabilitation Note       17 1005 17 1305 17 1044    Rehab Assessment/Intervention    Discipline physical therapist  -MIRIAM physical therapy assistant  -UD occupational therapist  -AN    Document Type therapy note (daily note);progress note  -MIRIAM therapy note (daily note)  -UD therapy note (daily note)  -AN    Subjective Information no complaints;agree to therapy  -MIRIAM agree to therapy;complains of;weakness  -UD no complaints;agree to therapy   pt supine, IV intact,O2 donned  -AN    Patient Effort, Rehab Treatment adequate   Pt had just finished working with OT   -MIRIAM fair  -UD good  -AN    Symptoms Noted During/After Treatment  shortness of breath;fatigue  -UD     Precautions/Limitations   fall precautions;oxygen therapy device and L/min  -AN    Specific Treatment Considerations   Spoke to RN regarding pt having some difficulty speaking this date. Pt c/o sore throat and suction assist prior to OT tx.  -AN    Recorded by [MIRIAM] Amaris Lazo, PT [UD] Marisol Valladares PTA [AN] Shy Ridley, OT    Vital Signs    Pre Systolic BP Rehab   --   vitals stable, pt on auto BP, RN present for vitals  -AN    Pre SpO2 (%)   95  -AN    O2 Delivery Pre Treatment   supplemental O2  -AN    Post SpO2 (%)   95  -AN    O2 Delivery Post Treatment   supplemental O2  -AN    Pre Patient Position  Sitting  -UD     Intra Patient Position  Standing  -UD     Post Patient Position  Sitting  -UD     Recorded by  [UD] Marisol Valladares PTA [AN] Shy Ridley, OT    Pain Assessment    Pain Assessment No/denies pain  -MIRIAM No/denies pain  -UD No/denies pain  -AN    Pain Score  0  -UD     Post Pain Score  0  -UD     Recorded by [MIRIAM] Amaris Lazo, PT [UD] Marisol Valladares PTA [AN] Shy Ridley, OT    Cognitive Assessment/Intervention    Current Cognitive/Communication Assessment impaired  -MIRIAM      Orientation Status oriented to;person  -MIRIAM oriented to;person  -UD oriented to;person;place;situation  -AN    Follows Commands/Answers Questions 100% of the time;able to follow multi-step instructions;needs cueing;needs repetition  -MIRIAM  100% of the time  -AN    Personal Safety mild impairment  -MIRIAM  mild impairment  -AN    Personal Safety Interventions muscle strengthening facilitated;fall prevention program maintained  -MIRIAM      Recorded by [MIRIAM] Amaris Laoz, PT [UD] Marisol Valladares PTA [AN] Shy Ridley, OT    Bed Mobility, Assessment/Treatment    Bed Mobility, Scoot/Bridge, [place]   moderate assist (50% patient effort)  -AN    Bed Mob, Supine to Sit, [place]  not tested   up inh  chair  -UD contact guard assist  -AN    Recorded by  [UD] Marisol Valladares PTA [AN] Shy Ridley, ROGELIO    Transfer Assessment/Treatment    Transfers, Bed-Chair [place]   moderate assist (50% patient effort)  -AN    Transfers, Bed-Chair-Bed, Assist Device   --   OT UE support  -AN    Transfers, Sit-Stand [place]  moderate assist (50% patient effort);2 person assist required;verbal cues required   stood 3 times  -UD minimum assist (75% patient effort)  -AN    Transfers, Stand-Sit [place]  verbal cues required;moderate assist (50% patient effort);2 person assist required  -UD minimum assist (75% patient effort)  -AN    Transfers, Sit-Stand-Sit, Assist Device  rolling walker  -UD     Recorded by  [UD] Marisol Valladares PTA [BELIA] Shy Ridley OT    Gait Assessment/Treatment    Gait, [place] Level  other (see comments)   pt and family refused  -UD     Recorded by  [UD] Marisol Valladares PTA     Toileting Assessment/Training    Toileting Assess/Train, Indepen Level   maximum assist (25% patient effort)  -AN    Recorded by   [BELIA] Shy Ridley OT    Balance Skills Training    Sitting-Level of Assistance   Close supervision  -AN    Sitting # of Minutes   5  -AN    Standing-Level of Assistance  Minimum assistance;x2  -UD Minimum assistance  -AN    Static Standing Balance Support   Right upper extremity supported;Left upper extremity supported  -AN    Recorded by  [UD] Marisol Valladares PTA [AN] Shy Ridley OT    Therapy Exercises    Bilateral Lower Extremities AAROM:;10 reps;sitting;ankle pumps/circles;heel slides;hip abduction/adduction;knee flexion;SLR;quad sets  -MIRIAM AROM:;5 reps;sitting  -UD     Bilateral Upper Extremity   10 reps;AROM:;sitting  -AN    Recorded by [MIRIAM] Amaris Lazo, PT [UD] Marisol Valladares PTA [AN] Shy Ridley OT    Positioning and Restraints    Pre-Treatment Position sitting in chair/recliner  -MIRIAM sitting in chair/recliner  -UD in bed  -AN    Post Treatment Position chair  -MIRIAM chair   -UD chair  -AN    In Chair reclined;call light within reach;encouraged to call for assist;exit alarm on;with family/caregiver;waffle cushion;legs elevated  -MIRIAM notified nsg;reclined;sitting;call light within reach;with family/caregiver;exit alarm on;legs elevated  -UD reclined;call light within reach;encouraged to call for assist;exit alarm on;with family/caregiver;notified nsg  -AN    Recorded by [MIRIAM] Amaris Lazo, PT [UD] Marisol Valladares, PTA [AN] Shy Ridley, OT      User Key  (r) = Recorded By, (t) = Taken By, (c) = Cosigned By    Initials Name Effective Dates    MIRIAM Amaris Lazo, PT 06/19/15 -     UD Marisol Valladares, PTA 06/22/15 -     AN Shy Ridley, OT 06/22/15 -                 IP PT Goals       01/09/17 1005 01/08/17 1358 01/05/17 1357    Bed Mobility PT LTG    Bed Mobility PT LTG, Date Goal Reviewed 01/09/17  -MIRIAM      Bed Mobility PT LTG, Outcome goal ongoing  -MIRIAM goal ongoing  -UD goal ongoing  -MB    Transfer Training PT LTG    Transfer Training PT  LTG, Date Goal Reviewed 01/09/17  -MIRIAM      Transfer Training PT LTG, Outcome goal ongoing  -MIRIAM goal ongoing  -UD goal ongoing  -MB    Gait Training PT LTG    Gait Training Goal PT LTG, Date Goal Reviewed 01/09/17  -MIRIAM      Gait Training Goal PT LTG, Outcome goal ongoing  -MIRIAM goal ongoing  -UD goal ongoing  -MB      01/04/17 1042 01/03/17 1634       Bed Mobility PT LTG    Bed Mobility PT LTG, Date Established 01/03/17  -DM 01/03/17  -DM     Bed Mobility PT LTG, Time to Achieve 1 wk  -DM 1 wk  -DM     Bed Mobility PT LTG, Activity Type all bed mobility  -DM all bed mobility  -DM     Bed Mobility PT LTG, [place] Level independent  -DM independent  -DM     Bed Mobility PT LTG, Outcome goal ongoing  -DM      Transfer Training PT LTG    Transfer Training PT LTG, Date Established 01/03/17  -DM 01/03/17  -DM     Transfer Training PT LTG, Time to Achieve 1 wk  -DM 1 wk  -DM     Transfer Training PT LTG, Activity Type sit to stand/stand to  sit;bed to chair /chair to bed;toilet  -DM bed to chair /chair to bed;sit to stand/stand to sit;toilet  -DM     Transfer Training PT LTG, [place] Level supervision required  -DM supervision required  -DM     Transfer Training PT LTG, Assist Device walker, rolling  -DM walker, rolling  -DM     Transfer Training PT LTG, Outcome goal ongoing  -DM      Gait Training PT LTG    Gait Training Goal PT LTG, Date Established 01/03/17  -DM 01/03/17  -DM     Gait Training Goal PT LTG, Time to Achieve 1 wk  -DM 1 wk  -DM     Gait Training Goal PT LTG, [place] Level contact guard assist  -DM contact guard assist  -DM     Gait Training Goal PT LTG, Assist Device walker, rolling  -DM walker, rolling  -DM     Gait Training Goal PT LTG, Distance to Achieve 30  -DM 30   w/ stable VS  -DM     Gait Training Goal PT LTG, Outcome goal ongoing  -DM        User Key  (r) = Recorded By, (t) = Taken By, (c) = Cosigned By    Initials Name Provider Type    MIRIAM Amaris Lazo, PT Physical Therapist    MIRIAM Valladares, PTA Physical Therapy Assistant    NICHOLAS Bennett, PT Physical Therapist    GEORGES Mariano, PT Physical Therapist          Physical Therapy Education     Title: PT OT SLP Therapies (Active)     Topic: Physical Therapy (Active)     Point: Mobility training (Active)    Learning Progress Summary    Learner Readiness Method Response Comment Documented by Status   Patient Acceptance E,D NR  UD 01/08/17 1357 Active    Acceptance E,D NR fall precautions, home safety, gait mechanics, HEP MB 01/05/17 1356 Active    Eager E,D NR  DM 01/04/17 1042 Active    Eager E,D NR  DM 01/03/17 1634 Active    Eager E VU  AP 01/03/17 0056 Done   Family Acceptance E,D NR  UD 01/08/17 1357 Active    Acceptance E,D NR fall precautions, home safety, gait mechanics, HEP MB 01/05/17 1356 Active    Eager E,D NR  DM 01/03/17 1634 Active               Point: Home exercise program (Active)    Learning Progress Summary    Learner  Readiness Method Response Comment Documented by Status   Patient Acceptance E,D NR  MIRIAM 01/09/17 1035 Active    Acceptance E,D NR  UD 01/08/17 1357 Active    Acceptance E,D NR fall precautions, home safety, gait mechanics, HEP MB 01/05/17 1356 Active    Eager E,D NR  DM 01/04/17 1042 Active    Eager E,D NR  DM 01/03/17 1634 Active    Eager E VU  AP 01/03/17 0056 Done   Family Acceptance E,D NR  UD 01/08/17 1357 Active    Acceptance E,D NR fall precautions, home safety, gait mechanics, HEP MB 01/05/17 1356 Active    Eager E,D NR  DM 01/03/17 1634 Active               Point: Body mechanics (Active)    Learning Progress Summary    Learner Readiness Method Response Comment Documented by Status   Patient Acceptance E,D NR  UD 01/08/17 1357 Active    Acceptance E,D NR fall precautions, home safety, gait mechanics, HEP MB 01/05/17 1356 Active    Eager E,D NR  DM 01/04/17 1042 Active    Eager E,D NR  DM 01/03/17 1634 Active    Eager E VU  AP 01/03/17 0056 Done   Family Acceptance E,D NR  UD 01/08/17 1357 Active    Acceptance E,D NR fall precautions, home safety, gait mechanics, HEP MB 01/05/17 1356 Active    Eager E,D NR  DM 01/03/17 1634 Active               Point: Precautions (Active)    Learning Progress Summary    Learner Readiness Method Response Comment Documented by Status   Patient Acceptance E,D NR  UD 01/08/17 1357 Active    Acceptance E,D NR fall precautions, home safety, gait mechanics, HEP MB 01/05/17 1356 Active    Eager E,D NR   01/04/17 1042 Active    Eager E,D NR  DM 01/03/17 1634 Active    Eager E VU  AP 01/03/17 0056 Done   Family Acceptance E,D NR  UD 01/08/17 1357 Active    Acceptance E,D NR fall precautions, home safety, gait mechanics, HEP MB 01/05/17 1356 Active    Eager E,D NR  DM 01/03/17 1634 Active                      User Key     Initials Effective Dates Name Provider Type Discipline    MIRIAM 06/19/15 -  Amaris Lazo, PT Physical Therapist PT     06/22/15 -  Marisol Valladares PTA  Physical Therapy Assistant PT    DM 06/19/15 -  Kiera Bennett, PT Physical Therapist PT    MB 03/14/16 -  Joann Mariano, PT Physical Therapist PT    AP 06/16/16 -  Robina Kimball RN Registered Nurse Nurse                    PT Recommendation and Plan  Anticipated Discharge Disposition: skilled nursing facility  PT Frequency: daily  Plan of Care Review  Plan Of Care Reviewed With: patient  Progress: no change  Outcome Summary/Follow up Plan: Pt tlerated session well. Pt completed ther ex with good effort with mulplte cues to complete. Pt to benefit from cont skilled therapy to address deficits and to reach therapy goals for safe progression toward PLOF.          Outcome Measures       01/09/17 1005 01/08/17 1305 01/07/17 1044    How much help from another person do you currently need...    Turning from your back to your side while in flat bed without using bedrails? 3  -MIRIAM 3  -UD     Moving from lying on back to sitting on the side of a flat bed without bedrails? 2  -MIRIAM 2  -UD     Moving to and from a bed to a chair (including a wheelchair)? 3  -MIRIAM 3  -UD     Standing up from a chair using your arms (e.g., wheelchair, bedside chair)? 2  -MIRIAM 2  -UD     Climbing 3-5 steps with a railing? 1  -MIRIAM 1  -UD     To walk in hospital room? 2  -MIRIAM 2  -UD     AM-PAC 6 Clicks Score 13  -MIRIAM 13  -UD     How much help from another is currently needed...    Putting on and taking off regular lower body clothing?   2  -AN    Bathing (including washing, rinsing, and drying)   2  -AN    Toileting (which includes using toilet bed pan or urinal)   1  -AN    Putting on and taking off regular upper body clothing   3  -AN    Taking care of personal grooming (such as brushing teeth)   3  -AN    Eating meals   4  -AN    Score   15  -AN    Functional Assessment    Outcome Measure Options AM-PAC 6 Clicks Basic Mobility (PT)  -MIRIAM        User Key  (r) = Recorded By, (t) = Taken By, (c) = Cosigned By    Initials Name Provider Type    MIRIAM  Amaris Lazo, PT Physical Therapist    MIRIAM Valladares, PTA Physical Therapy Assistant    BELIA Ridley, OT Occupational Therapist           Time Calculation:         PT Charges       01/09/17 1038          Time Calculation    Start Time 1005  -MIRIAM      PT Received On 01/09/17  -MIRIAM      PT Goal Re-Cert Due Date 01/13/17  -MIRIAM      Time Calculation- PT    Total Timed Code Minutes- PT 10 minute(s)  -MIRIAM        User Key  (r) = Recorded By, (t) = Taken By, (c) = Cosigned By    Initials Name Provider Type    MIRIAM Lazo, PT Physical Therapist          Therapy Charges for Today     Code Description Service Date Service Provider Modifiers Qty    64781310729 HC PT THER PROC EA 15 MIN 1/9/2017 Amaris Lazo, PT GP 1          PT G-Codes  Outcome Measure Options: AM-PAC 6 Clicks Basic Mobility (PT)    Amaris Lazo, PT  1/9/2017

## 2017-01-09 NOTE — PLAN OF CARE
Problem: Patient Care Overview (Adult)  Goal: Plan of Care Review  Outcome: Ongoing (interventions implemented as appropriate)    01/09/17 1030   Coping/Psychosocial Response Interventions   Plan Of Care Reviewed With son   Patient Care Overview   Progress no change   Outcome Evaluation   Outcome Summary/Follow up Plan Patient was faxed out to the [place] [facility], Dr. Plata signed off goals are set and no symptoms

## 2017-01-09 NOTE — PROGRESS NOTES
Avril Allen  [id number]  9/8/1921   LOS: 7 days   Patient Care Team:  Radha Pal MD as PCP - General (Internal Medicine)    Chief Complaint:  SOB / WEAKNESS    Subjective      Moderate tachypnea and dyspnea with course nonproductive cough and occasional audible wheezing.  Minimal oral intake.  No anginal type chest discomfort, headache, focal motor-sensory changes, or melena.  She still notes arthralgias involving both knees but more notable on the right.    Objective     Vital Sign Min/Max for last 24 hours  Temp  Min: 98.3 °F (36.8 °C)  Max: 98.7 °F (37.1 °C)   BP  Min: 119/67  Max: 124/75   Pulse  Min: 76  Max: 107   Resp  Min: 14  Max: 16      Flow (L/min)  Min: 2  Max: 3   Weight  Min: 114 lb 12.8 oz (52.1 kg)  Max: 114 lb 12.8 oz (52.1 kg)     Last 2 weights    01/08/17  0500 01/09/17  0544   Weight: 115 lb (52.2 kg) 114 lb 12.8 oz (52.1 kg)         Intake/Output Summary (Last 24 hours) at 01/09/17 0823  Last data filed at 01/08/17 1553   Gross per 24 hour   Intake    100 ml   Output      0 ml   Net    100 ml       Physical Exam:     General Appearance:    Alert, cooperative, in no acute distress   Lungs:     Clear to auscultation,respirations regular, even and                   unlabored    Heart:    Irregular and normal rate, normal S1 and S2, grade 2/6            murmur, no gallop, no rub, no click   Abdomen:  Extremities:   Soft, non-tender        No edema             Pulses:   Pulses palpable and equal bilaterally      Results Review:     Results from last 7 days  Lab Units 01/09/17  0429 01/08/17  0736 01/07/17  1602 01/06/17  0618   SODIUM mmol/L 139 141  --  141   POTASSIUM mmol/L 3.8 4.7 2.9* 3.2*   CHLORIDE mmol/L 97* 100  --  101   TOTAL CO2 mmol/L 33.0* 34.0*  --  31.0   BUN mg/dL 21 21  --  19   CREATININE mg/dL 1.00 1.00  --  0.90   GLUCOSE mg/dL 93 88  --  104*   CALCIUM mg/dL 9.5 9.8  --  9.5       Results from last 7 days  Lab Units 01/08/17  0736 01/06/17  0618 01/05/17  1133   WBC  10*3/mm3 11.40* 10.05 9.38   HEMOGLOBIN g/dL 9.3* 8.7* 8.3*   HEMATOCRIT % 29.0* 26.6* 26.1*   PLATELETS 10*3/mm3 197 187 190           NO EKG / CXR.        Medication Review:  REVIEWED    Assessment/Plan   Generally stable hemodynamics with acceptable GFR and persistent moderate-severe anemia with mild leukocytosis yesterday; yesterday's chest x-ray continues to demonstrate pulmonary vascular congestion without definite bronchopneumonia.  She needs bronchodilators and we will add oral Zaroxolyn 5 mg ×1.    Discussed with patient and daughter Roseline in room.    Principal Problem:    Acute diastolic (congestive) heart failure  Active Problems:    Hypertension    Atrial fibrillation, no anticoags due to hx GIB and fall risk    Pacemaker at end of battery life    Pulmonary hypertension    Acute hypoxic respiratory failure    Healthcare Associated Pneumonia    Valvular heart disease    Hypokalemia    Chronic anemia        01/09/17  8:23 AM

## 2017-01-09 NOTE — PROGRESS NOTES
Acute Care - Occupational Therapy Treatment Note  [facility]     Patient Name: Avril Allen  : 1921  MRN: [id number]  Today's Date: 2017  Onset of Illness/Injury or Date of Surgery Date: 17  Date of Referral to OT: 17  Referring Physician: Jackelin Medley MD      Admit Date: 2017    Visit Dx:     ICD-10-CM ICD-9-CM   1. Acute diastolic (congestive) heart failure I50.31 428.31     428.0   2. Bronchospasm J98.01 519.11   3. Acute respiratory failure with hypoxia J96.01 518.81   4. Community acquired pneumonia J18.9 486   5. Impaired mobility and ADLs Z74.09 799.89   6. Impaired functional mobility, balance, gait, and endurance Z74.09 V49.89   7. Chronic atrial fibrillation I48.2 427.31   8. Essential hypertension I10 401.9     Patient Active Problem List   Diagnosis   • Hypertension   • Arthritis   • Altered mental status   • Transient cerebral ischemia   • Atrial fibrillation, no anticoags due to hx GIB and fall risk   • Pacemaker at end of battery life   • Pacemaker-dependent due to native cardiac rhythm insufficient to support life   • Failure to thrive in adult   • Acute blood loss anemia   • GI bleed   • Acute diastolic (congestive) heart failure   • Pulmonary hypertension   • Acute hypoxic respiratory failure   • Healthcare Associated Pneumonia   • Valvular heart disease   • Hypokalemia   • Chronic anemia             Adult Rehabilitation Note       17 1005 17 0940 17 1305    Rehab Assessment/Intervention    Discipline physical therapist  -MIRIAM occupational therapist  -AC physical therapy assistant  -UD    Document Type therapy note (daily note);progress note  -MIRIAM therapy note (daily note)  -AC therapy note (daily note)  -UD    Subjective Information no complaints;agree to therapy  -MIRIAM agree to therapy;complains of;fatigue  -JOSH agree to therapy;complains of;weakness  -UD    Patient Effort, Rehab Treatment adequate   Pt had just finished working with OT  -MIRIAM good  -AC fair   -UD    Symptoms Noted During/After Treatment  fatigue  -AC shortness of breath;fatigue  -UD    Precautions/Limitations  fall precautions;oxygen therapy device and L/min  -AC     Recorded by [MIRIAM] Amaris Lazo, PT [AC] Ludmila Vargas, OT [UD] Marisol Valladares, PTA    Vital Signs    Pre SpO2 (%)  94  -AC     O2 Delivery Pre Treatment  supplemental O2  -AC     Intra SpO2 (%)  93  -AC     O2 Delivery Intra Treatment  supplemental O2  -AC     Post SpO2 (%)  94  -AC     O2 Delivery Post Treatment  supplemental O2  -AC     Pre Patient Position   Sitting  -UD    Intra Patient Position   Standing  -UD    Post Patient Position   Sitting  -UD    Recorded by  [AC] Ludmila Vargas, OT [UD] Marisol Valladares, PTA    Pain Assessment    Pain Assessment No/denies pain  -MIRIAM No/denies pain  -AC No/denies pain  -UD    Pain Score   0  -UD    Post Pain Score   0  -UD    Recorded by [MIRIAM] Amaris Lazo, PT [AC] Ludmila Vargas, OT [UD] Marisol Valladares, PTA    Cognitive Assessment/Intervention    Current Cognitive/Communication Assessment impaired  -MIRIAM impaired  -AC     Orientation Status oriented to;person  -MIRIAM oriented to;person  -AC oriented to;person  -UD    Follows Commands/Answers Questions 100% of the time;able to follow multi-step instructions;needs cueing;needs repetition  -MIRIAM 75% of the time;needs cueing;needs repetition  -AC     Personal Safety mild impairment  -MIRIAM mild impairment  -AC     Personal Safety Interventions muscle strengthening facilitated;fall prevention program maintained  -MIRIAM elopement precautions initiated;fall prevention program maintained;gait belt;nonskid shoes/slippers when out of bed  -AC     Recorded by [MIRIAM] Amaris Lazo, PT [AC] Ludmila Vargas, OT [UD] Marisol Valladares, MAI    Bed Mobility, Assessment/Treatment    Bed Mobility, Assistive Device  bed rails;head of bed elevated  -AC     Bed Mob, Supine to Sit, [place]  verbal cues required;moderate assist (50% patient effort)  -AC not  tested   up inh chair  -UD    Bed Mobility, Impairments  strength decreased;impaired balance  -AC     Recorded by  [AC] Ludmila Vargas, OT [UD] Marisol Valladares, MAI    Transfer Assessment/Treatment    Transfers, Bed-Chair [place]  verbal cues required;moderate assist (50% patient effort)  -AC     Transfers, Sit-Stand [place]  verbal cues required;moderate assist (50% patient effort)  -AC moderate assist (50% patient effort);2 person assist required;verbal cues required   stood 3 times  -UD    Transfers, Stand-Sit [place]  verbal cues required;moderate assist (50% patient effort)  -AC verbal cues required;moderate assist (50% patient effort);2 person assist required  -UD    Transfers, Sit-Stand-Sit, Assist Device  --   UE support and gait belt  -AC rolling walker  -UD    Transfer, Impairments  impaired balance;strength decreased  -AC     Transfer, Comment  Cues for upright position and to safely wait until she feels legs on chair before sitting  -AC     Recorded by  [AC] Ludmila Vargas, OT [UD] Marisol Valladares, MAI    Gait Assessment/Treatment    Gait, [place] Level   other (see comments)   pt and family refused  -UD    Recorded by   [UD] Marisol Valladares, MAI    Functional Mobility    Functional Mobility- Comment  too fatigued to attempt  -AC     Recorded by  [AC] Ludmila Vargas OT     Balance Skills Training    Sitting-Level of Assistance  Close supervision  -AC     Sitting-Balance Support  Feet supported  -AC     Sitting-Balance Activities  Trunk control activities  -AC     Standing-Level of Assistance  Moderate assistance  -AC Minimum assistance;x2  -UD    Static Standing Balance Support  Right upper extremity supported;Left upper extremity supported  -AC     Standing-Balance Activities  Weight Shift R-L  -AC     Recorded by  [AC] Ludmila Vargas, OT [UD] Marisol Valladares PTA    Therapy Exercises    Bilateral Lower Extremities AAROM:;10 reps;sitting;ankle pumps/circles;heel slides;hip abduction/adduction;knee  flexion;SLR;quad sets  -MIRIAM  AROM:;5 reps;sitting  -UD    Bilateral Upper Extremity  AROM:;10 reps;shoulder extension/flexion  -AC     Recorded by [MIRIAM] Amaris Lazo, PT [AC] Ludmila Vargas OT [UD] Marisol Valladares, MAI    Positioning and Restraints    Pre-Treatment Position sitting in chair/recliner  -MIRIAM in bed  -AC sitting in chair/recliner  -UD    Post Treatment Position chair  -MIRIAM chair  -AC chair  -UD    In Chair reclined;call light within reach;encouraged to call for assist;exit alarm on;with family/caregiver;waffle cushion;legs elevated  -MIRIAM reclined;call light within reach;encouraged to call for assist;exit alarm on;with family/caregiver;legs elevated  -AC notified nsg;reclined;sitting;call light within reach;with family/caregiver;exit alarm on;legs elevated  -UD    Recorded by [MIRIAM] Amaris Lazo, PT [AC] Ludmila Vargas, ROGELIO [UD] Marisol Valladares PTA      01/07/17 1044          Rehab Assessment/Intervention    Discipline occupational therapist  -AN      Document Type therapy note (daily note)  -AN      Subjective Information no complaints;agree to therapy   pt supine, IV intact,O2 donned  -AN      Patient Effort, Rehab Treatment good  -AN      Precautions/Limitations fall precautions;oxygen therapy device and L/min  -AN      Specific Treatment Considerations Spoke to RN regarding pt having some difficulty speaking this date. Pt c/o sore throat and suction assist prior to OT tx.  -AN      Recorded by [BELIA] Shy Ridley OT      Vital Signs    Pre Systolic BP Rehab --   vitals stable, pt on auto BP, RN present for vitals  -AN      Pre SpO2 (%) 95  -AN      O2 Delivery Pre Treatment supplemental O2  -AN      Post SpO2 (%) 95  -AN      O2 Delivery Post Treatment supplemental O2  -AN      Recorded by [BELIA] Shy Ridley OT      Pain Assessment    Pain Assessment No/denies pain  -AN      Recorded by [BELIA] Shy Ridley OT      Cognitive Assessment/Intervention    Orientation Status oriented  to;person;place;situation  -AN      Follows Commands/Answers Questions 100% of the time  -AN      Personal Safety mild impairment  -AN      Recorded by [BELIA] Shy Ridley OT      Bed Mobility, Assessment/Treatment    Bed Mobility, Scoot/Bridge, [place] moderate assist (50% patient effort)  -AN      Bed Mob, Supine to Sit, [place] contact guard assist  -AN      Recorded by [BELIA] Shy Ridley OT      Transfer Assessment/Treatment    Transfers, Bed-Chair [place] moderate assist (50% patient effort)  -AN      Transfers, Bed-Chair-Bed, Assist Device --   OT UE support  -AN      Transfers, Sit-Stand [place] minimum assist (75% patient effort)  -AN      Transfers, Stand-Sit [place] minimum assist (75% patient effort)  -AN      Recorded by [BELIA] Shy Ridley OT      Toileting Assessment/Training    Toileting Assess/Train, Indepen Level maximum assist (25% patient effort)  -AN      Recorded by [BELIA] Shy Ridley OT      Balance Skills Training    Sitting-Level of Assistance Close supervision  -AN      Sitting # of Minutes 5  -AN      Standing-Level of Assistance Minimum assistance  -AN      Static Standing Balance Support Right upper extremity supported;Left upper extremity supported  -AN      Recorded by [BELIA] Shy Ridley OT      Therapy Exercises    Bilateral Upper Extremity 10 reps;AROM:;sitting  -AN      Recorded by [BELIA] Shy Ridley OT      Positioning and Restraints    Pre-Treatment Position in bed  -AN      Post Treatment Position chair  -AN      In Chair reclined;call light within reach;encouraged to call for assist;exit alarm on;with family/caregiver;notified nsg  -AN      Recorded by [BELIA] Shy Ridley OT        User Key  (r) = Recorded By, (t) = Taken By, (c) = Cosigned By    Initials Name Effective Dates    MIRIAM Amaris Lazo, PT 06/19/15 -     JOSH Vargas, OT 06/23/15 -     MIRIAM Valladares, PTA 06/22/15 -     BELIA Ridley OT 06/22/15 -                 OT  Goals       01/09/17 1100 01/05/17 1435 01/03/17 1608    Transfer Training OT LTG    Transfer Training OT LTG, Date Established   01/03/17  -AR    Transfer Training OT LTG, Time to Achieve   1 wk  -AR    Transfer Training OT LTG, Activity Type   sit to stand/stand to sit;toilet  -AR    Transfer Training OT LTG, [place] Level   verbal cues required;contact guard assist  -AR    Transfer Training OT LTG, Assist Device   commode, bedside;walker, rolling  -AR    Transfer Training OT LTG, Outcome goal ongoing  -AC goal ongoing   Pt declined fxl mobility this date d/t just having PT  -TA     Patient Education OT LTG    Patient Education OT LTG, Date Established   01/03/17  -AR    Patient Education OT LTG, Time to Achieve   1 wk  -AR    Patient Education OT LTG, Education Type   HEP;home safety;energy conservation;work simplification;adaptive breathing  -AR    Patient Education OT LTG, Education Understanding   demonstrates adequately;verbalizes understanding  -AR    Patient Education OT LTG Outcome goal ongoing  -AC goal ongoing   Progressing with HEP, adaptive breathing this date.  -TA     LB Dressing OT LTG    LB Dressing Goal OT LTG, Date Established   01/03/17  -AR    LB Dressing Goal OT LTG, Time to Achieve   by discharge  -AR    LB Dressing Goal OT LTG, Activity Type   Pt will complete LB dressing task   -AR    LB Dressing Goal OT LTG, [place] Level   verbal cues required;moderate assist (50% patient effort)  -AR    LB Dressing Goal OT LTG, Adaptive Equipment   --   AE PRN  -AR    LB Dressing Goal OT LTG, Outcome goal ongoing  -AC goal ongoing  -TA     UB Dressing OT LTG    UB Dressing Goal OT LTG, Date Established   01/03/17  -AR    UB Dressing Goal OT LTG, Time to Achieve   by discharge  -AR    UB Dressing Goal OT LTG, Activity Type   Pt will complete UB dressing task   -AR    UB Dressing Goal OT LTG, [place] Level   verbal cues required;supervision required  -AR    UB Dressing Goal OT LTG, Outcome  goal ongoing  -AC goal ongoing  -TA       User Key  (r) = Recorded By, (t) = Taken By, (c) = Cosigned By    Initials Name Provider Type    AC Ludmila Vargas, OT Occupational Therapist    NORA Garcia, OT Occupational Therapist    RORY Ryan, OT Occupational Therapist          Occupational Therapy Education     Title: PT OT SLP Therapies (Active)     Topic: Occupational Therapy (Active)     Point: ADL training (Active)    Description: Instruct learner(s) on proper safety adaptation and remediation techniques during self care or transfers.   Instruct in proper use of assistive devices.    Learning Progress Summary    Learner Readiness Method Response Comment Documented by Status   Patient Acceptance E NR benefits of activity, safety with transfers AC 01/09/17 1100 Active    Eagreagan ROBLEROD NR  DM 01/03/17 1634 Active    Emmanuel ROBLEROTBD VU,NR Education provided on ADL retraining, transfer traiing, bed mobility, goals of care AR 01/03/17 1607 Done   Family Emmanuel ROBLEROD NR  DM 01/03/17 1634 Active    Emmanuel ROBLEROTB,D VU,NR Education provided on ADL retraining, transfer traiing, bed mobility, goals of care AR 01/03/17 1607 Done               Point: Home exercise program (Done)    Description: Instruct learner(s) on appropriate technique for monitoring, assisting and/or progressing therapeutic exercises/activities.    Learning Progress Summary    Learner Readiness Method Response Comment Documented by Status   Patient Acceptance E,D VU,DU,NR Educated on daily HEP and ADL participation benefits. AN 01/07/17 1118 Done    Acceptance ELIOT ROBLERO,D VU,NR BUE HEP, adaptive breathing technique to assist with mgt of SOA. TA 01/05/17 1434 Done    Bcer ED NR  DM 01/03/17 1634 Active    Eager ETB,D VU,NR Education provided on ADL retraining, transfer traiing, bed mobility, goals of care AR 01/03/17 1607 Done   Family Acceptance ANNIKAD VU,DU,NR Educated on daily HEP and ADL participation benefits. AN 01/07/17 1118 Done    Acceptance  ETB,D VU,NR BUE HEP, adaptive breathing technique to assist with mgt of SOA. TA 01/05/17 1434 Done    Eager E,D NR  DM 01/03/17 1634 Active    Eager E,TB,D VU,NR Education provided on ADL retraining, transfer traiing, bed mobility, goals of care AR 01/03/17 1607 Done               Point: Precautions (Active)    Description: Instruct learner(s) on prescribed precautions during self-care and functional transfers.    Learning Progress Summary    Learner Readiness Method Response Comment Documented by Status   Patient Eager E,D NR  DM 01/03/17 1634 Active    Eager E,TB,D VU,NR Education provided on ADL retraining, transfer traiing, bed mobility, goals of care AR 01/03/17 1607 Done   Family Eager E,D NR  DM 01/03/17 1634 Active    Eager E,TB,D VU,NR Education provided on ADL retraining, transfer traiing, bed mobility, goals of care AR 01/03/17 1607 Done               Point: Body mechanics (Done)    Description: Instruct learner(s) on proper positioning and spine alignment during self-care, functional mobility activities and/or exercises.    Learning Progress Summary    Learner Readiness Method Response Comment Documented by Status   Patient Acceptance E,D VU,DU,NR Educated on daily HEP and ADL participation benefits. AN 01/07/17 1118 Done    Eager E,D NR  DM 01/03/17 1634 Active    Eager E,TB,D VU,NR Education provided on ADL retraining, transfer traiing, bed mobility, goals of care AR 01/03/17 1607 Done   Family Acceptance E,D VU,DU,NR Educated on daily HEP and ADL participation benefits. AN 01/07/17 1118 Done    Eager E,D NR  DM 01/03/17 1634 Active    Eager E,TB,D VU,NR Education provided on ADL retraining, transfer traiing, bed mobility, goals of care AR 01/03/17 1607 Done                      User Key     Initials Effective Dates Name Provider Type Discipline    AC 06/23/15 -  Ludmila Vargas, OT Occupational Therapist OT    DM 06/19/15 -  Kiera Bennett, PT Physical Therapist PT    AN 06/22/15 -  Shy Ridley, OT  Occupational Therapist OT    AR 06/22/15 -  Cristy Garcia, OT Occupational Therapist OT    TA 03/14/16 -  Fito Ryan OT Occupational Therapist OT                  OT Recommendation and Plan  Anticipated Discharge Disposition: skilled nursing facility  Therapy Frequency: daily (per priority policy)  Plan of Care Review  Plan Of Care Reviewed With: patient  Progress: progress toward functional goals is gradual  Outcome Summary/Follow up Plan: Pt OOB to chair with mod a and requiring cues for safety with transfers. Pt req multiple cues for UE ther ex.  Pt limited with full particiaption d/t lethargy.        Outcome Measures       01/09/17 1005 01/09/17 0940 01/08/17 1305    How much help from another person do you currently need...    Turning from your back to your side while in flat bed without using bedrails? 3  -MIRIAM  3  -UD    Moving from lying on back to sitting on the side of a flat bed without bedrails? 2  -MIRIAM  2  -UD    Moving to and from a bed to a chair (including a wheelchair)? 3  -MIRIAM  3  -UD    Standing up from a chair using your arms (e.g., wheelchair, bedside chair)? 2  -MIRIAM  2  -UD    Climbing 3-5 steps with a railing? 1  -MIRIAM  1  -UD    To walk in hospital room? 2  -MIRIAM  2  -UD    AM-PAC 6 Clicks Score 13  -MIRIAM  13  -UD    How much help from another is currently needed...    Putting on and taking off regular lower body clothing?  2  -AC     Bathing (including washing, rinsing, and drying)  2  -AC     Toileting (which includes using toilet bed pan or urinal)  1  -AC     Putting on and taking off regular upper body clothing  3  -AC     Taking care of personal grooming (such as brushing teeth)  3  -AC     Eating meals  4  -AC     Score  15  -AC     Functional Assessment    Outcome Measure Options AM-PAC 6 Clicks Basic Mobility (PT)  -MIRIAM AM-PAC 6 Clicks Daily Activity (OT)  -AC       01/07/17 1044          How much help from another is currently needed...    Putting on and taking off regular lower  body clothing? 2  -AN      Bathing (including washing, rinsing, and drying) 2  -AN      Toileting (which includes using toilet bed pan or urinal) 1  -AN      Putting on and taking off regular upper body clothing 3  -AN      Taking care of personal grooming (such as brushing teeth) 3  -AN      Eating meals 4  -AN      Score 15  -AN        User Key  (r) = Recorded By, (t) = Taken By, (c) = Cosigned By    Initials Name Provider Type    MIRIAM Lazo, PT Physical Therapist    AC Ludmila Vargas, OT Occupational Therapist    MIRIAM Valladares, PTA Physical Therapy Assistant    AN Shy Ridley, OT Occupational Therapist           Time Calculation:         Time Calculation- OT       01/09/17 1105          Time Calculation- OT    OT Start Time 0940  -      Total Timed Code Minutes- OT 25 minute(s)  -      OT Received On 01/09/17  -      OT Goal Re-Cert Due Date 01/17/17  -        User Key  (r) = Recorded By, (t) = Taken By, (c) = Cosigned By    Initials Name Provider Type     Ludmila Vargas OT Occupational Therapist           Therapy Charges for Today     Code Description Service Date Service Provider Modifiers Qty    [id number]  OT THERAPEUTIC ACT EA 15 MIN 1/9/2017 Ludmila Vargas OT GO 2               Ludmila Vargas OT  1/9/2017

## 2017-01-09 NOTE — PROGRESS NOTES
Palliative Care Progress Note    Date of Admission: 1/2/2017    Subjective: Patient with no complaints at this point time.  Son states that she's been very sleepy but otherwise has been doing fairly well albeit getting weaker to she was before she came in hospital.  No current facility-administered medications on file prior to encounter.      Current Outpatient Prescriptions on File Prior to Encounter   Medication Sig Dispense Refill   • acetaminophen (TYLENOL) 325 MG tablet Take 2 tablets by mouth Every 4 (Four) Hours As Needed for mild pain (1-3).  0   • aspirin 325 MG tablet Take 325 mg by mouth Daily.     • brimonidine (ALPHAGAN) 0.15 % ophthalmic solution Administer 1 drop to the right eye 2 (Two) Times a Day.     • diclofenac (VOLTAREN) 25 MG EC tablet Take 25 mg by mouth 2 (Two) Times a Day.     • fexofenadine (ALLEGRA) 180 MG tablet Take 180 mg by mouth daily.     • FLUoxetine (PROzac) 40 MG capsule Take 40 mg by mouth Daily.     • fluticasone (FLONASE) 50 MCG/ACT nasal spray 2 sprays into each nostril Daily.     • folic acid (FOLVITE) 400 MCG tablet Take 400 mcg by mouth daily.     • gabapentin (NEURONTIN) 100 MG capsule Take 100 mg by mouth Every Night.     • LORazepam (ATIVAN) 0.5 MG tablet Take 1 tablet by mouth 2 (Two) Times a Day As Needed for anxiety. 10 tablet 0   • meclizine (ANTIVERT) 12.5 MG tablet Take 12.5 mg by mouth 2 (Two) Times a Day As Needed for dizziness.     • melatonin 5 MG tablet tablet Take 5 mg by mouth At Night As Needed.     • omeprazole (priLOSEC) 20 MG capsule Take 20 mg by mouth Daily.     • ondansetron ODT (ZOFRAN-ODT) 4 MG disintegrating tablet Take 4 mg by mouth 3 (Three) Times a Day As Needed for nausea or vomiting.     • polyethylene glycol (MIRALAX) packet Take 17 g by mouth Daily As Needed.          •  acetaminophen  •  albuterol  •  docusate sodium  •  ipratropium-albuterol  •  meclizine  •  ondansetron  •  polyethylene glycol  •  potassium chloride **OR** potassium  "chloride **OR** potassium chloride  •  sodium chloride  •  sodium chloride    Objective:   Visit Vitals   • /83   • Pulse 107   • Temp 98.3 °F (36.8 °C) (Oral)   • Resp 14   • Ht 62\" (157.5 cm)   • Wt 114 lb 13.8 oz (52.1 kg)   • SpO2 98%   • BMI 21.01 kg/m2        Intake/Output Summary (Last 24 hours) at 01/09/17 1048  Last data filed at 01/09/17 0904   Gross per 24 hour   Intake    290 ml   Output      0 ml   Net    290 ml     Physical Exam:      General Appearance:    Alert, cooperative, in no acute distress, frail appearing   Head:    Normocephalic, without obvious abnormality, atraumatic   Eyes:            Lids and lashes normal, conjunctivae and sclerae normal, no   icterus, no pallor, corneas clear, PERRLA   Ears:    Ears appear intact with no abnormalities noted   Throat:   No oral lesions, no thrush, oral mucosa moist   Neck:   No adenopathy, supple, trachea midline, no thyromegaly, no     carotid bruit, no JVD   Back:     No kyphosis present, no scoliosis present, no skin lesions,       erythema or scars, no tenderness to percussion or                   palpation,   range of motion normal   Lungs:    Decreased breathe sounds throughout    Heart:    Regular rhythm and normal rate, normal S1 and S2, no            murmur, no gallop, no rub, no click   Breast Exam:    Deferred   Abdomen:     Normal bowel sounds, no masses, no organomegaly, soft        non-tender, non-distended, no guarding, no rebound                 tenderness   Genitalia:    Deferred   Extremities:   Moves all extremities well, no edema, no cyanosis, no              redness   Pulses:   Pulses palpable and equal bilaterally   Skin:   No bleeding, bruising or rash   Lymph nodes:   No palpable adenopathy   Neurologic:   Cranial nerves 2 - 12 grossly intact, sensation intact, DTR        present and equal bilaterally       Results from last 7 days  Lab Units 01/08/17  0736   WBC 10*3/mm3 11.40*   HEMOGLOBIN g/dL 9.3*   HEMATOCRIT % 29.0* "   PLATELETS 10*3/mm3 197       Results from last 7 days  Lab Units 01/09/17  0429  01/02/17  1658   SODIUM mmol/L 139  < > 141   POTASSIUM mmol/L 3.8  < > 4.5   CHLORIDE mmol/L 97*  < > 102   TOTAL CO2 mmol/L 33.0*  < > 29.0   BUN mg/dL 21  < > 30*   CREATININE mg/dL 1.00  < > 1.10   CALCIUM mg/dL 9.5  < > 9.2   BILIRUBIN mg/dL  --   --  0.8   ALK PHOS U/L  --   --  148*   ALT (SGPT) U/L  --   --  14   AST (SGOT) U/L  --   --  25   GLUCOSE mg/dL 93  < > 102*   < > = values in this interval not displayed.    Impression: Diastolic congestive heart failure  Atrial fibrillation  Dyspnea  Goals of care  Plan: Plan is for the patient to go to a skilled nursing facility at the time of discharge.  I did talk to son by getting palliative medicine will follow patient at the nursing facility which she understands.  We will provide information.  At this point time the goals of care set palliative medicine will sign off.        Rocco Plata DO  01/09/17  10:48 AM

## 2017-01-09 NOTE — PROGRESS NOTES
Discharge Planning Assessment  [facility]     Patient Name: Avril Allen  MRN: [id number]  Today's Date: 1/9/2017    Admit Date: 1/2/2017          Discharge Needs Assessment     None            Discharge Plan       01/09/17 1225    Case Management/Social Work Plan    Plan Consented to participate in the [facility].     Patient/Family In Agreement With Plan yes      01/09/17 1125    Case Management/Social Work Plan    Plan SNF    Patient/Family In Agreement With Plan yes    Additional Comments I spoke with the son and daughter. Pt will need SNF at [place]. I called the referal to Mamie at [facility] to eval. Pt has been there before.        Discharge Placement     No information found        Expected Discharge Date and Time     Expected Discharge Date Expected Discharge Time    Jan 6, 2017 10:29 AM              Demographic Summary     None            Functional Status     None            Psychosocial     None            Abuse/Neglect     None            Legal     None            Substance Abuse     None            Patient Forms     None          Zayda English RN

## 2017-01-09 NOTE — PROGRESS NOTES
"Adult Nutrition  Assessment/PES    Patient Name:  Avril Allen  YOB: 1921  MRN: [id number]  Admit Date:  1/2/2017    Assessment Date:  1/9/2017        Reason for Assessment       01/09/17 1039    Reason for Assessment    Reason For Assessment/Visit follow up protocol    Time Spent (min) 20              Nutrition/Diet History       01/09/17 1040    Nutrition/Diet History    Reported/Observed By Patient;Family    Other Pt was in and out of sleep but son stated that her appetite has been improving            Anthropometrics       01/09/17 1044    Anthropometrics    Height 157.5 cm (62\")    Weight 52.1 kg (114 lb 13.8 oz)    Ideal Body Weight (IBW)    Ideal Body Weight (IBW), Female 50.83    % Ideal Body Weight 102.71    Body Mass Index (BMI)    BMI (kg/m2) 21.05            Labs/Tests/Procedures/Meds       01/09/17 1044    Labs/Tests/Procedures/Meds    Labs/Tests Review Reviewed                Nutrition Prescription Ordered       01/09/17 1045    Nutrition Prescription PO    Current PO Diet Soft Texture    Texture Whole foods    Supplement Boost Plus    Common Modifiers Cardiac;Low Sodium            Evaluation of Received Nutrient/Fluid Intake       01/09/17 1045    PO Evaluation    Number of Meals 4    % PO Intake 38              Problem/Interventions:        Problem 1       01/09/17 1049    Nutrition Diagnoses Problem 1    Problem 1 Inadequate Intake/Infusion    Inadequate Intake Type --    Etiology (related to) Medical Diagnosis   Per notes this admission    Signs/Symptoms (evidenced by) PO Intake    Percent (%) intake recorded 38 %    Over number of meals 4                    Intervention Goal       01/09/17 1055    Intervention Goal    General Nutrition support treatment    PO Increase intake            Nutrition Intervention       01/09/17 1055    Nutrition Intervention    RD/Tech Action Advise alternate selection;Encourage intake;Follow Tx progress              Education/Evaluation       01/09/17 " 1056    Monitor/Evaluation    Monitor Per protocol        Comments:      Electronically signed by:  Kayla Miranda  01/09/17 10:59 AM

## 2017-01-09 NOTE — PLAN OF CARE
Problem: Patient Care Overview (Adult)  Goal: Plan of Care Review  Outcome: Ongoing (interventions implemented as appropriate)    01/09/17 1100   Coping/Psychosocial Response Interventions   Plan Of Care Reviewed With patient   Patient Care Overview   Progress progress toward functional goals is gradual   Outcome Evaluation   Outcome Summary/Follow up Plan Pt OOB to chair with mod a and requiring cues for safety with transfers. Pt req multiple cues for UE ther ex. Pt limited with full particiaption d/t lethargy.         Problem: Inpatient Occupational Therapy  Goal: Transfer Training Goal 1 LTG- OT  Outcome: Ongoing (interventions implemented as appropriate)    01/03/17 1608 01/09/17 1100   Transfer Training OT LTG   Transfer Training OT LTG, Date Established 01/03/17 --    Transfer Training OT LTG, Time to Achieve 1 wk --    Transfer Training OT LTG, Activity Type sit to stand/stand to sit;toilet --    Transfer Training OT LTG, [place] Level verbal cues required;contact guard assist --    Transfer Training OT LTG, Assist Device commode, bedside;walker, rolling --    Transfer Training OT LTG, Outcome --  goal ongoing       Goal: Patient Education Goal LTG- OT  Outcome: Ongoing (interventions implemented as appropriate)    01/03/17 1608 01/09/17 1100   Patient Education OT LTG   Patient Education OT LTG, Date Established 01/03/17 --    Patient Education OT LTG, Time to Achieve 1 wk --    Patient Education OT LTG, Education Type HEP;home safety;energy conservation;work simplification;adaptive breathing --    Patient Education OT LTG, Education Understanding demonstrates adequately;verbalizes understanding --    Patient Education OT LTG Outcome --  goal ongoing       Goal: LB Dressing Goal LTG- OT  Outcome: Ongoing (interventions implemented as appropriate)    01/03/17 1608 01/09/17 1100   LB Dressing OT LTG   LB Dressing Goal OT LTG, Date Established 01/03/17 --    LB Dressing Goal OT LTG, Time to Achieve by  discharge --    LB Dressing Goal OT LTG, Activity Type Pt will complete LB dressing task  --    LB Dressing Goal OT LTG, [place] Level verbal cues required;moderate assist (50% patient effort) --    LB Dressing Goal OT LTG, Adaptive Equipment (AE PRN) --    LB Dressing Goal OT LTG, Outcome --  goal ongoing       Goal: UB Dressing Goal LTG- OT  Outcome: Ongoing (interventions implemented as appropriate)    01/03/17 1608 01/09/17 1100   UB Dressing OT LTG   UB Dressing Goal OT LTG, Date Established 01/03/17 --    UB Dressing Goal OT LTG, Time to Achieve by discharge --    UB Dressing Goal OT LTG, Activity Type Pt will complete UB dressing task  --    UB Dressing Goal OT LTG, [place] Level verbal cues required;supervision required --    UB Dressing Goal OT LTG, Outcome --  goal ongoing

## 2017-01-10 NOTE — PLAN OF CARE
Problem: Patient Care Overview (Adult)  Goal: Plan of Care Review  Outcome: Ongoing (interventions implemented as appropriate)    01/10/17 1037   Coping/Psychosocial Response Interventions   Plan Of Care Reviewed With patient;daughter   Patient Care Overview   Progress progress toward functional goals as expected   Outcome Evaluation   Outcome Summary/Follow up Plan Pt transferred to chair with A x 2 with RW. Pt participation following transfer limited due to fatigue.         Problem: Inpatient Physical Therapy  Goal: Bed Mobility Goal LTG- PT  Outcome: Ongoing (interventions implemented as appropriate)    01/04/17 1042 01/09/17 1005   Bed Mobility PT LTG   Bed Mobility PT LTG, Date Established 01/03/17 --    Bed Mobility PT LTG, Time to Achieve 1 wk --    Bed Mobility PT LTG, Activity Type all bed mobility --    Bed Mobility PT LTG, [place] Level independent --    Bed Mobility PT LTG, Date Goal Reviewed --  01/09/17   Bed Mobility PT LTG, Outcome --  goal ongoing       Goal: Transfer Training Goal 1 LTG- PT  Outcome: Ongoing (interventions implemented as appropriate)    01/04/17 1042 01/09/17 1005   Transfer Training PT LTG   Transfer Training PT LTG, Date Established 01/03/17 --    Transfer Training PT LTG, Time to Achieve 1 wk --    Transfer Training PT LTG, Activity Type sit to stand/stand to sit;bed to chair /chair to bed;toilet --    Transfer Training PT LTG, [place] Level supervision required --    Transfer Training PT LTG, Assist Device walker, rolling --    Transfer Training PT LTG, Date Goal Reviewed --  01/09/17   Transfer Training PT LTG, Outcome --  goal ongoing       Goal: Gait Training Goal LTG- PT  Outcome: Ongoing (interventions implemented as appropriate)    01/04/17 1042 01/09/17 1005   Gait Training PT LTG   Gait Training Goal PT LTG, Date Established 01/03/17 --    Gait Training Goal PT LTG, Time to Achieve 1 wk --    Gait Training Goal PT LTG, [place] Level contact guard assist  --    Gait Training Goal PT LTG, Assist Device walker, rolling --    Gait Training Goal PT LTG, Distance to Achieve 30 --    Gait Training Goal PT LTG, Date Goal Reviewed --  01/09/17   Gait Training Goal PT LTG, Outcome --  goal ongoing

## 2017-01-10 NOTE — PROGRESS NOTES
[facility] Medicine Services  INPATIENT PROGRESS NOTE    Date of Admission: 1/2/2017  Length of Stay: 8  Primary Care Physician: Radha Pal MD    Subjective     Chief Complaint: bilateral infiltrates  HPI:  No events overnight.  She is doing about the same today.  More oriented.  Potassium down today.    Review Of Systems:   Review of Systems   Reason unable to perform ROS: patient confusion.     Objective      Temp:  [97 °F (36.1 °C)-98.2 °F (36.8 °C)] 97.4 °F (36.3 °C)  Heart Rate:  [77-96] 88  Resp:  [16-18] 16  BP: ()/(59-78) 90/59  Physical Exam    Frail and weak, but no acute distress, currently awake and in no distress  Up in chair  irreg irrg  abd soft, non tender non distended  Some bilateral rhonchi and rales, unchanged from prvious  No LE edema  Neuro - awake, confused, follows commands, and no focal deficits.  Skin - scattered bruises    Results Review:    I have reviewed the labs, radiology results and diagnostic studies.      Results from last 7 days  Lab Units 01/08/17  0736   WBC 10*3/mm3 11.40*   HEMOGLOBIN g/dL 9.3*   PLATELETS 10*3/mm3 197       Results from last 7 days  Lab Units 01/10/17  0613   SODIUM mmol/L 138   POTASSIUM mmol/L 2.6*   TOTAL CO2 mmol/L 38.0*   CREATININE mg/dL 0.90   GLUCOSE mg/dL 84       Culture Data:   BLOOD CULTURE   Date Value Ref Range Status   01/02/2017 No growth at 2 days  Preliminary   01/02/2017 No growth at 2 days  Preliminary     Radiology Data:     I have reviewed the medications.    aspirin 325 mg Oral Daily   brimonidine 1 drop Both Eyes BID   carvedilol 6.25 mg Oral BID With Meals   cetirizine 10 mg Oral Daily   DULoxetine 30 mg Oral Daily   FLUoxetine 40 mg Oral Daily   fluticasone 2 spray Nasal Daily   gabapentin 100 mg Oral Nightly   guaifenesin-dextromethorphan 1 tablet Oral BID   heparin (porcine) 5,000 Units Subcutaneous Q12H   lidocaine 1 patch Transdermal Q24H   nystatin  Topical Q12H   pantoprazole 40 mg Oral  "Q AM   pharmacy consult - MTM  Does not apply Daily   potassium chloride 20 mEq Oral BID With Meals   [START ON 1/11/2017] torsemide 20 mg Oral Daily   valsartan 40 mg Oral Q12H     CXR (1/8 my read) - worsening edema/effusions    Assessment/Plan     Assessment/Problem List  Principal Problem:    Acute diastolic (congestive) heart failure  Active Problems:    Atrial fibrillation, no anticoags due to hx GIB and fall risk    Hypertension    Pacemaker at end of battery life    Pulmonary hypertension    Acute hypoxic respiratory failure    Healthcare Associated Pneumonia    Valvular heart disease    Hypokalemia    Chronic anemia    Plan  Acute on Chronic Resp failure  - multifactorial, but seems most c/w volume overload  - seems improved to baseline.     - B/L infitrates on cxr - more consistent with CHF based on improvement with diuresis.  Couldn't exclude PNA, s/p 7 days of empiric abx    A/C diastolic CHF  --I think have diuresed as much as reasonable.  Will change IV bumex to 20mg demadex dailly.  - replace K+, and may need scheduled replacement while on demadx    Anemia    - related to recent GI bleed last year, stable     PPM at end of battery life - no plans for change    Valvular Heart Dz  - not a surgical candidate    Dispo:  I discussed with dtr at bedside this past weekend.. Goal is going to be to get \"tuned up\" as much as possible, but she understands that patient will likely continue to decline given age and current condition.  She seems interested in palliative following at SNF and perhaps focusing on comfort and preventing readmissions if possible.      - awaiting to here about [place] bed availability.  If K+ better and bed ready, could be transferred tomorrow.      DVT prophylaxis: mechanical    High complexity.    Victor Manuel Russ MD   01/10/17   3:05 PM    Please note that portions of this note may have been completed with a voice recognition program. Efforts were made to edit the dictations, but " occasionally words are mistranscribed.

## 2017-01-10 NOTE — PROGRESS NOTES
Avril Allen  [id number]  9/8/1921   LOS: 8 days   Patient Care Team:  Radha Pal MD as PCP - General (Internal Medicine)    Chief Complaint:  SOB    Subjective      Patient continues to have a nonproductive cough but denies chest pain, melena, abdominal pain, increased SOB. Says her right knee hurts. Daughter in room says she had 2 BM that were loose and dark. Daughter said she got a neb treatment last night and her mom slept better last night.  Generally comfortable on supplemental oxygen therapy on minimal activity.    Objective     Vital Sign Min/Max for last 24 hours  Temp  Min: 97 °F (36.1 °C)  Max: 98.2 °F (36.8 °C)   BP  Min: 103/78  Max: 120/83   Pulse  Min: 77  Max: 96   Resp  Min: 16  Max: 18   SpO2  Min: 97 %  Max: 99 %   Flow (L/min)  Min: 2  Max: 2   Weight  Min: 114 lb 13.8 oz (52.1 kg)  Max: 114 lb 13.8 oz (52.1 kg)     Last 2 weights    01/09/17  1044 01/10/17  0500   Weight: 114 lb 13.8 oz (52.1 kg) (pt family refused wants to do it later per aislinn.)         Intake/Output Summary (Last 24 hours) at 01/10/17 0720  Last data filed at 01/09/17 0904   Gross per 24 hour   Intake    240 ml   Output      0 ml   Net    240 ml       Physical Exam:     General Appearance:    Alert, cooperative, frail, in no acute distress   Lungs:     Mild Rales to auscultation,respirations regular, even and                   unlabored    Heart:    Irregular and normal rate, normal S1 and S2, grade 3/6 murmur, no gallop, no rub, no click   Abdomen:  Extremities:   Soft, non-tender        No edema             Pulses:   Pulses palpable and equal bilaterally      Results Review:     Results from last 7 days  Lab Units 01/09/17  0429 01/08/17  0736 01/07/17  1602 01/06/17  0618   SODIUM mmol/L 139 141  --  141   POTASSIUM mmol/L 3.8 4.7 2.9* 3.2*   CHLORIDE mmol/L 97* 100  --  101   TOTAL CO2 mmol/L 33.0* 34.0*  --  31.0   BUN mg/dL 21 21  --  19   CREATININE mg/dL 1.00 1.00  --  0.90   GLUCOSE mg/dL 93 88  --  104*    CALCIUM mg/dL 9.5 9.8  --  9.5       Results from last 7 days  Lab Units 01/08/17  0736 01/06/17  0618 01/05/17  1133   WBC 10*3/mm3 11.40* 10.05 9.38   HEMOGLOBIN g/dL 9.3* 8.7* 8.3*   HEMATOCRIT % 29.0* 26.6* 26.1*   PLATELETS 10*3/mm3 197 187 190           NO EKG / CXR.        Medication Review: Reviewed    Assessment/Plan      Possible transfer on Wednesday ( 1 / 11 /17 ) to [place] for continued palliative care. Consider switch to po torsemide today. No new chest x rays to review. 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 Pacemaker at [facility] with no plan to change based on age, complexity of issues. Anemia improving. No new labs to review yet this morning. Continue current cardiac medications; would consider altering IV Bumex to torsemide 20 mg daily.  Hopefully anemia will continue to improve which in turn will reduce the tendency for recurrent congestive heart failure.  No laboratory studies to review this morning.    Discussed with patient and daughter in room.    Principal Problem:    Acute diastolic (congestive) heart failure  Active Problems:    Hypertension    Atrial fibrillation, no anticoags due to hx GIB and fall risk    Pacemaker at end of battery life    Pulmonary hypertension    Acute hypoxic respiratory failure    Healthcare Associated Pneumonia    Valvular heart disease    Hypokalemia    Chronic anemia    Scribed for Lucas Isbell MD by SHAGUFTA Vega. 1/10/2017  7:20 AM     I, Lucas Isbell MD, [facility], personally performed the services described in this documentation as scribed by the above named individual in my presence, and it is both accurate and complete.       01/10/17  7:20 AM

## 2017-01-10 NOTE — PROGRESS NOTES
Continued Stay Note  [facility]     Patient Name: Avril Allen  MRN: [id number]  Today's Date: 1/10/2017    Admit Date: 1/2/2017          Discharge Plan       01/10/17 1443    Case Management/Social Work Plan    Additional Comments I left a message with Mamie at [facility] to check bed situation/status.              Discharge Codes     None        Expected Discharge Date and Time     Expected Discharge Date Expected Discharge Time    Jan 6, 2017 10:29 AM            Haily Shoemaker RN

## 2017-01-10 NOTE — PLAN OF CARE
Problem: Fall Risk (Adult)  Goal: Absence of Falls  Outcome: Ongoing (interventions implemented as appropriate)    01/10/17 1722   Fall Risk (Adult)   Absence of Falls making progress toward outcome         Problem: Cardiac: Heart Failure (Adult)  Goal: Signs and Symptoms of Listed Potential Problems Will be Absent or Manageable (Cardiac: Heart Failure)  Outcome: Ongoing (interventions implemented as appropriate)    01/10/17 1722   Cardiac: Heart Failure   Problems Assessed (Heart Failure) all   Problems Present (Heart Failure) decreased quality of life;dysrhythmia/arrhythmia;fluid/electrolyte imbalance;functional decline/self-care deficit;respiratory compromise;situational response         Problem: Patient Care Overview (Adult)  Goal: Plan of Care Review  Outcome: Ongoing (interventions implemented as appropriate)    01/10/17 1722   Coping/Psychosocial Response Interventions   Plan Of Care Reviewed With patient;family   Patient Care Overview   Progress progress toward functional goals as expected       Goal: Discharge Needs Assessment  Outcome: Ongoing (interventions implemented as appropriate)    01/03/17 0947 01/03/17 1430 01/07/17 0357   Discharge Needs Assessment   Concerns To Be Addressed --  --  basic needs concerns;cognitive/perceptual concerns;discharge planning concerns   Readmission Within The Last 30 Days --  --  no previous admission in last 30 days   Equipment Needed After Discharge --  --  walker, rolling;wheelchair   Discharge Facility/Level Of Care Needs --  --  nursing facility, skilled   Current Discharge Risk --  --  dependent with mobility/activities of daily living   Discharge Disposition --  --  still a patient;skilled nursing facility   Discharge Planning Comments Pt is from assisted living. She will need to be back to her baseline to return. --  --    Current Health   Anticipated Changes Related to Illness --  --  inability to care for self   Living Environment   Transportation Available --   car;family or friend will provide --    Self-Care   Equipment Currently Used at Home --  wheelchair;walker, rolling;cane, straight  (tripod R wx) --          Problem: Skin Integrity Impairment, Risk/Actual (Adult)  Goal: Skin Integrity/Wound Healing  Outcome: Ongoing (interventions implemented as appropriate)    01/10/17 0276   Skin Integrity Impairment, Risk/Actual (Adult)   Skin Integrity/Wound Healing making progress toward outcome

## 2017-01-10 NOTE — PROGRESS NOTES
Acute Care - Physical Therapy Treatment Note  [facility]     Patient Name: Avril Allen  : 1921  MRN: [id number]  Today's Date: 1/10/2017  Onset of Illness/Injury or Date of Surgery Date: 17  Date of Referral to PT: 17  Referring Physician: Jackelin Medley MD    Admit Date: 2017    Visit Dx:    ICD-10-CM ICD-9-CM   1. Acute diastolic (congestive) heart failure I50.31 428.31     428.0   2. Bronchospasm J98.01 519.11   3. Acute respiratory failure with hypoxia J96.01 518.81   4. Community acquired pneumonia J18.9 486   5. Impaired mobility and ADLs Z74.09 799.89   6. Impaired functional mobility, balance, gait, and endurance Z74.09 V49.89   7. Chronic atrial fibrillation I48.2 427.31   8. Essential hypertension I10 401.9     Patient Active Problem List   Diagnosis   • Hypertension   • Arthritis   • Altered mental status   • Transient cerebral ischemia   • Atrial fibrillation, no anticoags due to hx GIB and fall risk   • Pacemaker at end of battery life   • Pacemaker-dependent due to native cardiac rhythm insufficient to support life   • Failure to thrive in adult   • Acute blood loss anemia   • GI bleed   • Acute diastolic (congestive) heart failure   • Pulmonary hypertension   • Acute hypoxic respiratory failure   • Healthcare Associated Pneumonia   • Valvular heart disease   • Hypokalemia   • Chronic anemia               Adult Rehabilitation Note       01/10/17 1005 17 1005 17 0940    Rehab Assessment/Intervention    Discipline physical therapist  -SH physical therapist  -MIRIAM occupational therapist  -AC    Document Type therapy note (daily note)  -SH therapy note (daily note);progress note  -MIRIAM therapy note (daily note)  -AC    Subjective Information no complaints;agree to therapy  -SH no complaints;agree to therapy  -MIRIAM agree to therapy;complains of;fatigue  -AC    Patient Effort, Rehab Treatment good  -SH adequate   Pt had just finished working with OT  -MIRIAM good  -AC    Symptoms  Noted During/After Treatment fatigue  -  fatigue  -AC    Precautions/Limitations   fall precautions;oxygen therapy device and L/min  -AC    Recorded by [] Johnna Esposito, PT [MIRIAM] Amaris Lazo, PT [AC] Ludmila Vargas, OT    Vital Signs    Pre SpO2 (%)   94  -AC    O2 Delivery Pre Treatment   supplemental O2  -AC    Intra SpO2 (%)   93  -AC    O2 Delivery Intra Treatment   supplemental O2  -AC    Post SpO2 (%)   94  -AC    O2 Delivery Post Treatment   supplemental O2  -AC    Recorded by   [AC] Ludmila Vargas OT    Pain Assessment    Pain Assessment No/denies pain  - No/denies pain  -MIRIAM No/denies pain  -AC    Recorded by [] Johnna Esposito, PT [MIRIAM] Amaris Lazo, PT [AC] Ludmila Vargas OT    Cognitive Assessment/Intervention    Current Cognitive/Communication Assessment impaired  - impaired  -MIRIAM impaired  -AC    Orientation Status oriented to;person  - oriented to;person  -MIRIAM oriented to;person  -AC    Follows Commands/Answers Questions able to follow single-step instructions;100% of the time;needs cueing  - 100% of the time;able to follow multi-step instructions;needs cueing;needs repetition  -MIRIAM 75% of the time;needs cueing;needs repetition  -AC    Personal Safety mild impairment  - mild impairment  -MIRIAM mild impairment  -AC    Personal Safety Interventions fall prevention program maintained;gait belt;nonskid shoes/slippers when out of bed  - muscle strengthening facilitated;fall prevention program maintained  -MIRIAM elopement precautions initiated;fall prevention program maintained;gait belt;nonskid shoes/slippers when out of bed  -AC    Recorded by [] Johnna Esposito, PT [MIRIAM] Amaris Lazo, PT [AC] Ludmila Vargas, OT    Bed Mobility, Assessment/Treatment    Bed Mobility, Assistive Device head of bed elevated;bed rails  -  bed rails;head of bed elevated  -    Bed Mobility, Roll Right, [place] supervision required  -      Bed Mobility, Scoot/Bridge,  [place] contact guard assist;verbal cues required  -      Bed Mob, Supine to Sit, [place] supervision required;verbal cues required  -  verbal cues required;moderate assist (50% patient effort)  -    Bed Mobility, Safety Issues cognitive deficits limit understanding  -      Bed Mobility, Impairments strength decreased;impaired balance  -  strength decreased;impaired balance  -AC    Bed Mobility, Comment Pt anxious to get out of bed this am.  -SH      Recorded by [SH] Johnna Esposito, PT  [AC] Ludmila Vargas OT    Transfer Assessment/Treatment    Transfers, Bed-Chair [place]   verbal cues required;moderate assist (50% patient effort)  -AC    Transfers, Sit-Stand [place] minimum assist (75% patient effort);2 person assist required;verbal cues required;nonverbal cues required (demo/gesture)  -  verbal cues required;moderate assist (50% patient effort)  -AC    Transfers, Stand-Sit [place] minimum assist (75% patient effort);2 person assist required;verbal cues required;nonverbal cues required (demo/gesture)  -  verbal cues required;moderate assist (50% patient effort)  -AC    Transfers, Sit-Stand-Sit, Assist Device rolling walker  -  --   UE support and gait belt  -    Transfer, Safety Issues balance decreased during turns;sequencing ability decreased;step length decreased  -      Transfer, Impairments strength decreased;impaired balance  -  impaired balance;strength decreased  -AC    Transfer, Comment Pt performed sit to stand at edge of bed to don brief for transfer and OOB. Pt returned to sitting. Performed sit to stand a second time and transferred to chair with RW. Pt posterior lean, anxious to sit down.  -  Cues for upright position and to safely wait until she feels legs on chair before sitting  -AC    Recorded by [SH] Johnna Esposito, PT  [AC] Ludmila Vargas, OT    Gait Assessment/Treatment    Gait, [place] Level moderate assist (50% patient effort);2 person  assist required;verbal cues required;nonverbal cues required (demo/gesture)  -      Gait, Assistive Device rolling walker  -      Gait, Distance (Feet) 2  -      Gait, Gait Pattern Analysis swing-to gait  -      Gait, Gait Deviations ranjit decreased;forward flexed posture  -      Gait, Safety Issues balance decreased during turns;sequencing ability decreased;step length decreased;loses balance backward;supplemental O2  -      Gait, Comment Steps to chair only.  -      Recorded by [] Johnna Esposito, PT      Functional Mobility    Functional Mobility- Comment   too fatigued to attempt  -    Recorded by   [AC] Ludmila Vargas, OT    Balance Skills Training    Sitting-Level of Assistance Contact guard  -  Close supervision  -    Sitting-Balance Support Feet supported;Right upper extremity supported;Left upper extremity supported  -  Feet supported  -    Sitting-Balance Activities --   Maintain midline  -  Trunk control activities  -    Sitting # of Minutes 5  -      Standing-Level of Assistance   Moderate assistance  -    Static Standing Balance Support   Right upper extremity supported;Left upper extremity supported  -    Standing-Balance Activities   Weight Shift R-L  -AC    Recorded by [SH] Johnna Esposito, PT  [AC] Ludmila Vargas, OT    Therapy Exercises    Bilateral Lower Extremities AROM:;10 reps;sitting;ankle pumps/circles;LAQ;hip flexion  - AAROM:;10 reps;sitting;ankle pumps/circles;heel slides;hip abduction/adduction;knee flexion;SLR;quad sets  -     Bilateral Upper Extremity AROM:;10 reps;sitting;elbow flexion/extension;shoulder extension/flexion  -  AROM:;10 reps;shoulder extension/flexion  -AC    Recorded by [] Johnna Esposito, PT [MIRIAM] Amaris Lazo, PT [AC] Ludmila Vargas, OT    Positioning and Restraints    Pre-Treatment Position in bed  - sitting in chair/recliner  -MIRIAM in bed  -    Post Treatment Position chair  - chair  -MIRIAM chair  -AC    In  Chair reclined;notified nsg;call light within reach;encouraged to call for assist;exit alarm on;legs elevated  -SH reclined;call light within reach;encouraged to call for assist;exit alarm on;with family/caregiver;waffle cushion;legs elevated  -MIRIAM reclined;call light within reach;encouraged to call for assist;exit alarm on;with family/caregiver;legs elevated  -AC    Recorded by [SH] Johnna Esposito, PT [MIRIAM] Amaris Lazo, PT [AC] Ludmlia Vargas, OT      01/08/17 1305 01/07/17 1044       Rehab Assessment/Intervention    Discipline physical therapy assistant  -UD occupational therapist  -AN     Document Type therapy note (daily note)  -UD therapy note (daily note)  -AN     Subjective Information agree to therapy;complains of;weakness  -UD no complaints;agree to therapy   pt supine, IV intact,O2 donned  -AN     Patient Effort, Rehab Treatment fair  -UD good  -AN     Symptoms Noted During/After Treatment shortness of breath;fatigue  -UD      Precautions/Limitations  fall precautions;oxygen therapy device and L/min  -AN     Specific Treatment Considerations  Spoke to RN regarding pt having some difficulty speaking this date. Pt c/o sore throat and suction assist prior to OT tx.  -AN     Recorded by [UD] Marisol Valladares PTA [AN] Shy Ridley, OT     Vital Signs    Pre Systolic BP Rehab  --   vitals stable, pt on auto BP, RN present for vitals  -AN     Pre SpO2 (%)  95  -AN     O2 Delivery Pre Treatment  supplemental O2  -AN     Post SpO2 (%)  95  -AN     O2 Delivery Post Treatment  supplemental O2  -AN     Pre Patient Position Sitting  -UD      Intra Patient Position Standing  -UD      Post Patient Position Sitting  -UD      Recorded by [UD] Marisol Valladares PTA [AN] Shy Ridley, OT     Pain Assessment    Pain Assessment No/denies pain  -UD No/denies pain  -AN     Pain Score 0  -UD      Post Pain Score 0  -UD      Recorded by [UD] Marisol Valladares PTA [AN] Shy Ridley, OT     Cognitive Assessment/Intervention     Orientation Status oriented to;person  -UD oriented to;person;place;situation  -AN     Follows Commands/Answers Questions  100% of the time  -AN     Personal Safety  mild impairment  -AN     Recorded by [UD] Marisol Valladares PTA [AN] Shy Ridley, OT     Bed Mobility, Assessment/Treatment    Bed Mobility, Scoot/Bridge, [place]  moderate assist (50% patient effort)  -AN     Bed Mob, Supine to Sit, [place] not tested   up inh chair  -UD contact guard assist  -AN     Recorded by [UD] Marisol Valladares PTA [AN] Shy Ridley OT     Transfer Assessment/Treatment    Transfers, Bed-Chair [place]  moderate assist (50% patient effort)  -AN     Transfers, Bed-Chair-Bed, Assist Device  --   OT UE support  -AN     Transfers, Sit-Stand [place] moderate assist (50% patient effort);2 person assist required;verbal cues required   stood 3 times  -UD minimum assist (75% patient effort)  -AN     Transfers, Stand-Sit [place] verbal cues required;moderate assist (50% patient effort);2 person assist required  -UD minimum assist (75% patient effort)  -AN     Transfers, Sit-Stand-Sit, Assist Device rolling walker  -UD      Recorded by [UD] Marisol Valladares PTA [AN] Shy Ridley, OT     Gait Assessment/Treatment    Gait, [place] Level other (see comments)   pt and family refused  -UD      Recorded by [UD] Marisol Valladarse PTA      Toileting Assessment/Training    Toileting Assess/Train, Indepen Level  maximum assist (25% patient effort)  -AN     Recorded by  [AN] Shy Ridley OT     Balance Skills Training    Sitting-Level of Assistance  Close supervision  -AN     Sitting # of Minutes  5  -AN     Standing-Level of Assistance Minimum assistance;x2  -UD Minimum assistance  -AN     Static Standing Balance Support  Right upper extremity supported;Left upper extremity supported  -AN     Recorded by [UD] Marisol Valladares PTA [AN] Shy Ridley, ROGELIO     Therapy Exercises    Bilateral Lower Extremities AROM:;5 reps;sitting  -UD       Bilateral Upper Extremity  10 reps;AROM:;sitting  -AN     Recorded by [UD] Marisol Valladares, MAI [AN] Shy Ridley OT     Positioning and Restraints    Pre-Treatment Position sitting in chair/recliner  -UD in bed  -AN     Post Treatment Position chair  -UD chair  -AN     In Chair notified nsg;reclined;sitting;call light within reach;with family/caregiver;exit alarm on;legs elevated  -UD reclined;call light within reach;encouraged to call for assist;exit alarm on;with family/caregiver;notified nsg  -AN     Recorded by [UD] Marisol Valladares PTA [AN] Shy Ridley OT       User Key  (r) = Recorded By, (t) = Taken By, (c) = Cosigned By    Initials Name Effective Dates    MIRIAM Amaris Lazo, PT 06/19/15 -     AC Ludmila Vargas, OT 06/23/15 -     UD Marisol Valladares, PTA 06/22/15 -     SH Johnna Esposito, PT 06/19/15 -     AN Shy Ridley, OT 06/22/15 -                 IP PT Goals       01/09/17 1005 01/08/17 1358 01/05/17 1357    Bed Mobility PT LTG    Bed Mobility PT LTG, Date Goal Reviewed 01/09/17  -MIRIAM      Bed Mobility PT LTG, Outcome goal ongoing  -MIRIAM goal ongoing  -UD goal ongoing  -MB    Transfer Training PT LTG    Transfer Training PT  LTG, Date Goal Reviewed 01/09/17  -MIRIAM      Transfer Training PT LTG, Outcome goal ongoing  -MIRIAM goal ongoing  -UD goal ongoing  -MB    Gait Training PT LTG    Gait Training Goal PT LTG, Date Goal Reviewed 01/09/17  -MIRIAM      Gait Training Goal PT LTG, Outcome goal ongoing  -MIRIAM goal ongoing  -UD goal ongoing  -MB      01/04/17 1042 01/03/17 1634       Bed Mobility PT LTG    Bed Mobility PT LTG, Date Established 01/03/17  -DM 01/03/17  -DM     Bed Mobility PT LTG, Time to Achieve 1 wk  -DM 1 wk  -DM     Bed Mobility PT LTG, Activity Type all bed mobility  -DM all bed mobility  -DM     Bed Mobility PT LTG, [place] Level independent  -DM independent  -DM     Bed Mobility PT LTG, Outcome goal ongoing  -DM      Transfer Training PT LTG    Transfer Training PT LTG, Date  Established 01/03/17  -DM 01/03/17  -DM     Transfer Training PT LTG, Time to Achieve 1 wk  -DM 1 wk  -DM     Transfer Training PT LTG, Activity Type sit to stand/stand to sit;bed to chair /chair to bed;toilet  -DM bed to chair /chair to bed;sit to stand/stand to sit;toilet  -DM     Transfer Training PT LTG, [place] Level supervision required  -DM supervision required  -DM     Transfer Training PT LTG, Assist Device walker, rolling  -DM walker, rolling  -DM     Transfer Training PT LTG, Outcome goal ongoing  -DM      Gait Training PT LTG    Gait Training Goal PT LTG, Date Established 01/03/17  -DM 01/03/17  -DM     Gait Training Goal PT LTG, Time to Achieve 1 wk  -DM 1 wk  -DM     Gait Training Goal PT LTG, [place] Level contact guard assist  -DM contact guard assist  -DM     Gait Training Goal PT LTG, Assist Device walker, rolling  -DM walker, rolling  -DM     Gait Training Goal PT LTG, Distance to Achieve 30  -DM 30   w/ stable VS  -DM     Gait Training Goal PT LTG, Outcome goal ongoing  -DM        User Key  (r) = Recorded By, (t) = Taken By, (c) = Cosigned By    Initials Name Provider Type    MIRIAM Lazo, PT Physical Therapist    MIRIAM Valladares, PTA Physical Therapy Assistant    NICHOLAS Bennett, PT Physical Therapist    MB Joann Mariano, PT Physical Therapist          Physical Therapy Education     Title: PT OT SLP Therapies (Active)     Topic: Physical Therapy (Active)     Point: Mobility training (Active)    Learning Progress Summary    Learner Readiness Method Response Comment Documented by Status   Patient Acceptance E,D VU,NR HEP, transfer safety. SH 01/10/17 1036 Done    Acceptance E VU  KB 01/10/17 0355 Done    Acceptance E,D NR  UD 01/08/17 1357 Active    Acceptance E,D NR fall precautions, home safety, gait mechanics, HEP MB 01/05/17 1356 Active    Eager E,D NR  DM 01/04/17 1042 Active    Eager E,D NR  DM 01/03/17 1634 Active    Eager E VU  AP 01/03/17 0056 Done    Family Acceptance E,D NR  UD 01/08/17 1357 Active    Acceptance E,D NR fall precautions, home safety, gait mechanics, HEP MB 01/05/17 1356 Active    Eager E,D NR  DM 01/03/17 1634 Active               Point: Home exercise program (Active)    Learning Progress Summary    Learner Readiness Method Response Comment Documented by Status   Patient Acceptance E,D VU,NR HEP, transfer safety.  01/10/17 1036 Done    Acceptance E VU  KB 01/10/17 0355 Done    Acceptance E,D NR  MIRIAM 01/09/17 1035 Active    Acceptance E,D NR  UD 01/08/17 1357 Active    Acceptance E,D NR fall precautions, home safety, gait mechanics, HEP MB 01/05/17 1356 Active    Eager E,D NR  DM 01/04/17 1042 Active    Eager E,D NR  DM 01/03/17 1634 Active    Eager E VU  AP 01/03/17 0056 Done   Family Acceptance E,D NR  UD 01/08/17 1357 Active    Acceptance E,D NR fall precautions, home safety, gait mechanics, HEP MB 01/05/17 1356 Active    Eager E,D NR  DM 01/03/17 1634 Active               Point: Body mechanics (Active)    Learning Progress Summary    Learner Readiness Method Response Comment Documented by Status   Patient Acceptance E,D VU,NR HEP, transfer safety.  01/10/17 1036 Done    Acceptance E VU  KB 01/10/17 0355 Done    Acceptance E,D NR  UD 01/08/17 1357 Active    Acceptance E,D NR fall precautions, home safety, gait mechanics, HEP MB 01/05/17 1356 Active    Eager E,D NR  DM 01/04/17 1042 Active    Eager E,D NR  DM 01/03/17 1634 Active    Eager E VU  AP 01/03/17 0056 Done   Family Acceptance E,D NR  UD 01/08/17 1357 Active    Acceptance E,D NR fall precautions, home safety, gait mechanics, HEP MB 01/05/17 1356 Active    Eager E,D NR  DM 01/03/17 1634 Active               Point: Precautions (Active)    Learning Progress Summary    Learner Readiness Method Response Comment Documented by Status   Patient Acceptance E,D VU,NR HEP, transfer safety.  01/10/17 1036 Done    Acceptance E VU  KB 01/10/17 0355 Done    Acceptance E,D NR  UD 01/08/17 1357  Active    Acceptance E,D NR fall precautions, home safety, gait mechanics, HEP MB 01/05/17 1356 Active    Eager E,D NR  DM 01/04/17 1042 Active    Eager E,D NR   01/03/17 1634 Active    Eager E VU  AP 01/03/17 0056 Done   Family Acceptance E,D NR  UD 01/08/17 1357 Active    Acceptance E,D NR fall precautions, home safety, gait mechanics, HEP MB 01/05/17 1356 Active    Eager E,D NR   01/03/17 1634 Active                      User Key     Initials Effective Dates Name Provider Type Discipline     06/19/15 -  Amaris Lazo, PT Physical Therapist PT     06/22/15 -  Marisol Valladares, PTA Physical Therapy Assistant PT     06/19/15 -  Johnna Esposito, PT Physical Therapist PT     06/19/15 -  Kiera Bennett, PT Physical Therapist PT    MB 03/14/16 -  Joann Mariano, PT Physical Therapist PT     06/16/16 -  Robina Kimball, RN Registered Nurse Nurse     06/16/16 -  Briana Dietz, RN Registered Nurse Nurse                    PT Recommendation and Plan  Anticipated Discharge Disposition: skilled nursing facility  PT Frequency: daily  Plan of Care Review  Plan Of Care Reviewed With: patient, daughter  Progress: progress toward functional goals as expected  Outcome Summary/Follow up Plan: Pt transferred to chair with A x 2 with RW. Pt participation following transfer limited due to fatigue.          Outcome Measures       01/10/17 1005 01/09/17 1005 01/09/17 0940    How much help from another person do you currently need...    Turning from your back to your side while in flat bed without using bedrails? 3  -SH 3  -MIRIAM     Moving from lying on back to sitting on the side of a flat bed without bedrails? 3  -SH 2  -MIRIAM     Moving to and from a bed to a chair (including a wheelchair)? 3  -SH 3  -MIRIAM     Standing up from a chair using your arms (e.g., wheelchair, bedside chair)? 2  -SH 2  -MIRIAM     Climbing 3-5 steps with a railing? 1  -SH 1  -MIRIAM     To walk in hospital room? 2  -SH 2  -MIRIAM     AM-PAC 6  Clicks Score 14  -SH 13  -MIRIAM     How much help from another is currently needed...    Putting on and taking off regular lower body clothing?   2  -AC    Bathing (including washing, rinsing, and drying)   2  -AC    Toileting (which includes using toilet bed pan or urinal)   1  -AC    Putting on and taking off regular upper body clothing   3  -AC    Taking care of personal grooming (such as brushing teeth)   3  -AC    Eating meals   4  -AC    Score   15  -AC    Functional Assessment    Outcome Measure Options AM-PAC 6 Clicks Basic Mobility (PT)  -SH AM-PAC 6 Clicks Basic Mobility (PT)  -MIRIAM AM-PAC 6 Clicks Daily Activity (OT)  -AC      01/08/17 1305 01/07/17 1044       How much help from another person do you currently need...    Turning from your back to your side while in flat bed without using bedrails? 3  -UD      Moving from lying on back to sitting on the side of a flat bed without bedrails? 2  -UD      Moving to and from a bed to a chair (including a wheelchair)? 3  -UD      Standing up from a chair using your arms (e.g., wheelchair, bedside chair)? 2  -UD      Climbing 3-5 steps with a railing? 1  -UD      To walk in hospital room? 2  -UD      AM-PAC 6 Clicks Score 13  -UD      How much help from another is currently needed...    Putting on and taking off regular lower body clothing?  2  -AN     Bathing (including washing, rinsing, and drying)  2  -AN     Toileting (which includes using toilet bed pan or urinal)  1  -AN     Putting on and taking off regular upper body clothing  3  -AN     Taking care of personal grooming (such as brushing teeth)  3  -AN     Eating meals  4  -AN     Score  15  -AN       User Key  (r) = Recorded By, (t) = Taken By, (c) = Cosigned By    Initials Name Provider Type    MIRIAM Lazo, PT Physical Therapist    JOSH Vargas, OT Occupational Therapist    MIRIAM Valladares, PTA Physical Therapy Assistant     Johnna Esposito, PT Physical Therapist    AN Shy Ridley,  OT Occupational Therapist           Time Calculation:         PT Charges       01/10/17 1039          Time Calculation    Start Time 1005  -SH      PT Received On 01/10/17  -      PT Goal Re-Cert Due Date 01/13/17  -      Time Calculation- PT    Total Timed Code Minutes- PT 25 minute(s)  -        User Key  (r) = Recorded By, (t) = Taken By, (c) = Cosigned By    Initials Name Provider Type     Johnna Esposito, PT Physical Therapist          Therapy Charges for Today     Code Description Service Date Service Provider Modifiers Qty    79875301973 HC PT THER PROC EA 15 MIN 1/10/2017 Johnna Esposito, PT GP 2    92426714286 HC PT THER SUPP EA 15 MIN 1/10/2017 Johnna Esposito, PT GP 2          PT G-Codes  Outcome Measure Options: AM-PAC 6 Clicks Basic Mobility (PT)    Johnna Esposito, PT  1/10/2017

## 2017-01-10 NOTE — PLAN OF CARE
Problem: Cardiac: Heart Failure (Adult)  Intervention: Promote Functional Ability    01/09/17 0859 01/09/17 2000 01/10/17 0300   Activity   Activity Type --  --  activity adjusted per tolerance;activity encouraged   Activity Level of Assistance --  --  assistance, 1 person   Coping/Psychosocial Interventions   Environmental Support --  calm environment promoted;rest periods encouraged;distractions minimized --    Musculoskeletal Interventions   Self-Care Promotion independence encouraged;BADL personal objects within reach;BADL personal routines maintained;meal setup provided;safe use of adaptive equipment encouraged --  --        Intervention: Provide Oxygenation/Ventilation/Perfusion Support    01/10/17 0200 01/10/17 0300   Activity   Activity Type --  activity adjusted per tolerance;activity encouraged   Safety Interventions   Medication Review/Management medications reviewed;high risk medications identified --    Positioning   Head Of Bed (HOB) Position HOB at 30-45 degrees --        Intervention: Support Psychosocial Response to Heart Failure    01/09/17 2000   Coping Strategies   Family/Support System Care support provided   Supportive Measures active listening utilized;relaxation techniques promoted       Intervention: Monitor/Manage Nutrition Support    01/08/17 0900   Adjust Diet to Patient Tolerance   Nutrition Interventions meal setup provided       Intervention: Gradually Correct Positive Fluid Balance    01/06/17 2055 01/09/17 2000 01/10/17 0200   Positioning   Positioning --  --  semi-Fowlers   Nutrition Interventions   Fluid/Electrolyte Management fluids provided --  --    Skin Interventions   Skin Protection --  transparent dressing maintained;skin to device areas padded;pectin skin barriers applied;incontinence pads utilized;electrode sites changed --        Intervention: Prevent/Manage DVT/VTE Risk    01/09/17 2000   Support Surgical/Anesthesia Recovery   Venous Thromboembolism Prevent/Manage  sequential compression devices off;compression stockings off         Goal: Signs and Symptoms of Listed Potential Problems Will be Absent or Manageable (Cardiac: Heart Failure)  Outcome: Ongoing (interventions implemented as appropriate)    01/09/17 1707   Cardiac: Heart Failure   Problems Assessed (Heart Failure) all   Problems Present (Heart Failure) situational response         Problem: Patient Care Overview (Adult)  Goal: Plan of Care Review  Outcome: Ongoing (interventions implemented as appropriate)    01/09/17 1100 01/09/17 1707 01/09/17 2000   Coping/Psychosocial Response Interventions   Plan Of Care Reviewed With --  --  patient;daughter   Patient Care Overview   Progress --  progress toward functional goals as expected --    Outcome Evaluation   Outcome Summary/Follow up Plan Pt OOB to chair with mod a and requiring cues for safety with transfers. Pt req multiple cues for UE ther ex. Pt limited with full particiaption d/t lethargy. --  --        Goal: Discharge Needs Assessment  Outcome: Ongoing (interventions implemented as appropriate)    01/03/17 0947 01/03/17 1430 01/07/17 0357   Discharge Needs Assessment   Concerns To Be Addressed --  --  basic needs concerns;cognitive/perceptual concerns;discharge planning concerns   Readmission Within The Last 30 Days --  --  no previous admission in last 30 days   Equipment Needed After Discharge --  --  walker, rolling;wheelchair   Discharge Facility/Level Of Care Needs --  --  nursing facility, skilled   Current Discharge Risk --  --  dependent with mobility/activities of daily living   Discharge Disposition --  --  still a patient;skilled nursing facility   Discharge Planning Comments Pt is from assisted living. She will need to be back to her baseline to return. --  --    Current Health   Anticipated Changes Related to Illness --  --  inability to care for self   Living Environment   Transportation Available --  car;family or friend will provide --     Self-Care   Equipment Currently Used at Home --  wheelchair;walker, rolling;cane, straight  (tripod R wx) --          Problem: Skin Integrity Impairment, Risk/Actual (Adult)  Intervention: Promote/Optimize Nutrition    01/06/17 0500   Hygiene Care Assistance   Oral Care oral rinse provided       Intervention: Prevent/Manage Excess Moisture    01/09/17 0930 01/09/17 2000   Hygiene Care Assistance   Perineal Care cleansed;absorbent pad changed --    Hygiene Care   Bathing/Skin Care bath, complete;dressed/undressed;incontinence care;linen changed --    Skin Interventions   Skin Protection --  transparent dressing maintained;skin to device areas padded;pectin skin barriers applied;incontinence pads utilized;electrode sites changed       Intervention: Prevent/Minimize Sheer/Friction Injuries    01/08/17 2000 01/09/17 2000   Positioning   Positioning/Transfer Devices pillows;in use --    Skin Interventions   Pressure Reduction Techniques --  weight shift assistance provided;positioned off wounds;pressure points protected   Pressure Reduction Devices --  pressure-redistributing mattress utilized         Goal: Skin Integrity/Wound Healing  Outcome: Ongoing (interventions implemented as appropriate)    01/09/17 1707   Skin Integrity Impairment, Risk/Actual (Adult)   Skin Integrity/Wound Healing making progress toward outcome

## 2017-01-11 NOTE — PROGRESS NOTES
Acute Care - Occupational Therapy Treatment Note  [facility]     Patient Name: Avril Allen  : 1921  MRN: [id number]  Today's Date: 2017  Onset of Illness/Injury or Date of Surgery Date: 17  Date of Referral to OT: 17  Referring Physician: Jackelin Medley MD      Admit Date: 2017    Visit Dx:     ICD-10-CM ICD-9-CM   1. Acute diastolic (congestive) heart failure I50.31 428.31     428.0   2. Bronchospasm J98.01 519.11   3. Acute respiratory failure with hypoxia J96.01 518.81   4. Community acquired pneumonia J18.9 486   5. Impaired mobility and ADLs Z74.09 799.89   6. Impaired functional mobility, balance, gait, and endurance Z74.09 V49.89   7. Chronic atrial fibrillation I48.2 427.31   8. Essential hypertension I10 401.9     Patient Active Problem List   Diagnosis   • Hypertension   • Arthritis   • Altered mental status   • Transient cerebral ischemia   • Atrial fibrillation, no anticoags due to hx GIB and fall risk   • Pacemaker at end of battery life   • Pacemaker-dependent due to native cardiac rhythm insufficient to support life   • Failure to thrive in adult   • Acute blood loss anemia   • GI bleed   • Acute diastolic (congestive) heart failure   • Pulmonary hypertension   • Acute hypoxic respiratory failure   • Healthcare Associated Pneumonia   • Valvular heart disease   • Hypokalemia   • Chronic anemia             Adult Rehabilitation Note       17 1139 01/10/17 1005 17 1005    Rehab Assessment/Intervention    Discipline occupational therapist  -SK physical therapist  -SH physical therapist  -MIRIAM    Document Type therapy note (daily note)  -SK therapy note (daily note)  - therapy note (daily note);progress note  -MIRIAM    Subjective Information no complaints;agree to therapy  -SK no complaints;agree to therapy  -SH no complaints;agree to therapy  -MIRIAM    Patient Effort, Rehab Treatment good  -SK good  -SH adequate   Pt had just finished working with OT  -IMRIAM    Symptoms  Noted During/After Treatment fatigue  -SK fatigue  -     Precautions/Limitations fall precautions;oxygen therapy device and L/min   impaired skin integrity  -SK      Equipment Issued to Patient feeding equipment   sippy cup, improved grasp however still weak in BUE's  -SK      Recorded by [SK] Antonina Albarado, OTR [SH] Johnna Esposito, PT [MIRIAM] Amaris Lazo, PT    Pain Assessment    Pain Assessment No/denies pain  -SK No/denies pain  - No/denies pain  -MIRIAM    Recorded by [SK] Antonina Albarado, OTR [SH] Johnna Esposito, PT [MIRIAM] Amaris Lazo, PT    Cognitive Assessment/Intervention    Current Cognitive/Communication Assessment functional  -SK impaired  - impaired  -MIRIAM    Orientation Status oriented to;person;situation  -SK oriented to;person  - oriented to;person  -MIRIAM    Follows Commands/Answers Questions 100% of the time;able to follow single-step instructions  -SK able to follow single-step instructions;100% of the time;needs cueing  - 100% of the time;able to follow multi-step instructions;needs cueing;needs repetition  -MIRIAM    Personal Safety mild impairment  -SK mild impairment  - mild impairment  -MIRIAM    Personal Safety Interventions fall prevention program maintained  -SK fall prevention program maintained;gait belt;nonskid shoes/slippers when out of bed  - muscle strengthening facilitated;fall prevention program maintained  -MIRIAM    Recorded by [SK] Antonina Albarado, OTR [SH] Johnna Esposito, PT [MIRIAM] Amaris Lazo, PT    Bed Mobility, Assessment/Treatment    Bed Mobility, Assistive Device  head of bed elevated;bed rails  -     Bed Mobility, Roll Right, [place]  supervision required  -     Bed Mobility, Scoot/Bridge, [place]  contact guard assist;verbal cues required  -     Bed Mob, Supine to Sit, [place]  supervision required;verbal cues required  -     Bed Mobility, Safety Issues  cognitive deficits limit understanding  -     Bed  Mobility, Impairments  strength decreased;impaired balance  -     Bed Mobility, Comment  Pt anxious to get out of bed this am.  -     Recorded by  [] Johnna Esposito, PT     Transfer Assessment/Treatment    Transfers, Sit-Stand [place]  minimum assist (75% patient effort);2 person assist required;verbal cues required;nonverbal cues required (demo/gesture)  -     Transfers, Stand-Sit [place]  minimum assist (75% patient effort);2 person assist required;verbal cues required;nonverbal cues required (demo/gesture)  -     Transfers, Sit-Stand-Sit, Assist Device  rolling walker  -     Transfer, Safety Issues  balance decreased during turns;sequencing ability decreased;step length decreased  -     Transfer, Impairments  strength decreased;impaired balance  -     Transfer, Comment pt refused  -SK Pt performed sit to stand at edge of bed to don brief for transfer and OOB. Pt returned to sitting. Performed sit to stand a second time and transferred to chair with RW. Pt posterior lean, anxious to sit down.  -     Recorded by [SK] Antonina Albarado, OTR [] Johnna Esposito, PT     Gait Assessment/Treatment    Gait, [place] Level  moderate assist (50% patient effort);2 person assist required;verbal cues required;nonverbal cues required (demo/gesture)  -     Gait, Assistive Device  rolling walker  -     Gait, Distance (Feet)  2  -     Gait, Gait Pattern Analysis  swing-to gait  -     Gait, Gait Deviations  ranjit decreased;forward flexed posture  -     Gait, Safety Issues  balance decreased during turns;sequencing ability decreased;step length decreased;loses balance backward;supplemental O2  -     Gait, Comment  Steps to chair only.  -     Recorded by  [] Johnna Esposito, PT     ADL Assessment/Intervention    Additional Documentation Self-Feeding Assessment/Training (Group)  -SK      Recorded by [SK] Antonina Albarado, OTR      Self-Feeding Assessment/Training    Self-Feeding  Assess/Train, Assist Device other (see comments)  -SK      Self-Feeding Assess/Train, Position supported sitting  -SK      Self-Feeding Assess/Train, [place] hand over hand  -SK      Self-Feeding Assess/Train, Impairments strength decreased;coordination impaired  -SK      Self-Feeding Assess/Train, Comment issued sippy cup per [facility] request-pt with improved grasp however d/t weakness of BUE's, pt still demo's some difficulty. FMC and grasp strength preventing self-feeding with utinsils this date  -SK      Recorded by [SK] Antonina Albarado, OTR      Balance Skills Training    Sitting-Level of Assistance  Contact guard  -     Sitting-Balance Support  Feet supported;Right upper extremity supported;Left upper extremity supported  -     Sitting-Balance Activities  --   Maintain midline  -     Sitting # of Minutes  5  -     Recorded by  [] Johnna Esposito, MINI     Therapy Exercises    Bilateral Lower Extremities  AROM:;10 reps;sitting;ankle pumps/circles;LAQ;hip flexion  - AAROM:;10 reps;sitting;ankle pumps/circles;heel slides;hip abduction/adduction;knee flexion;SLR;quad sets  -    Bilateral Upper Extremity  AROM:;10 reps;sitting;elbow flexion/extension;shoulder extension/flexion  -     Recorded by  [SH] Johnna Esposito, PT [MIRIAM] Amaris Lazo, PT    Positioning and Restraints    Pre-Treatment Position in bed  -SK in bed  - sitting in chair/recliner  -    Post Treatment Position bed  -SK chair  - chair  -MIRIAM    In Bed notified nsg;supine;call light within reach;encouraged to call for assist;exit alarm on  -SK      In Chair  reclined;notified nsg;call light within reach;encouraged to call for assist;exit alarm on;legs elevated  - reclined;call light within reach;encouraged to call for assist;exit alarm on;with family/caregiver;waffle cushion;legs elevated  -    Recorded by [SK] Antonina Albarado, OTR [] Johnna Esposito, PT [MIRIAM] Amaris Lazo, PT      01/09/17 3349           Rehab Assessment/Intervention    Discipline occupational therapist  -AC      Document Type therapy note (daily note)  -AC      Subjective Information agree to therapy;complains of;fatigue  -AC      Patient Effort, Rehab Treatment good  -AC      Symptoms Noted During/After Treatment fatigue  -AC      Precautions/Limitations fall precautions;oxygen therapy device and L/min  -AC      Recorded by [AC] Ludmila Vargas OT      Vital Signs    Pre SpO2 (%) 94  -AC      O2 Delivery Pre Treatment supplemental O2  -AC      Intra SpO2 (%) 93  -AC      O2 Delivery Intra Treatment supplemental O2  -AC      Post SpO2 (%) 94  -AC      O2 Delivery Post Treatment supplemental O2  -AC      Recorded by [AC] Ludmila Vargas OT      Pain Assessment    Pain Assessment No/denies pain  -AC      Recorded by [AC] Ludmila Vargas OT      Cognitive Assessment/Intervention    Current Cognitive/Communication Assessment impaired  -AC      Orientation Status oriented to;person  -AC      Follows Commands/Answers Questions 75% of the time;needs cueing;needs repetition  -AC      Personal Safety mild impairment  -AC      Personal Safety Interventions elopement precautions initiated;fall prevention program maintained;gait belt;nonskid shoes/slippers when out of bed  -AC      Recorded by [AC] Ludmila Vargas OT      Bed Mobility, Assessment/Treatment    Bed Mobility, Assistive Device bed rails;head of bed elevated  -AC      Bed Mob, Supine to Sit, [place] verbal cues required;moderate assist (50% patient effort)  -AC      Bed Mobility, Impairments strength decreased;impaired balance  -AC      Recorded by [AC] Ludmila Vargas OT      Transfer Assessment/Treatment    Transfers, Bed-Chair [place] verbal cues required;moderate assist (50% patient effort)  -AC      Transfers, Sit-Stand [place] verbal cues required;moderate assist (50% patient effort)  -AC      Transfers, Stand-Sit [place] verbal cues required;moderate assist (50% patient  effort)  -AC      Transfers, Sit-Stand-Sit, Assist Device --   UE support and gait belt  -AC      Transfer, Impairments impaired balance;strength decreased  -AC      Transfer, Comment Cues for upright position and to safely wait until she feels legs on chair before sitting  -AC      Recorded by [AC] Ludmila Vargas, ROGELIO      Functional Mobility    Functional Mobility- Comment too fatigued to attempt  -AC      Recorded by [AC] Ludmila Vargas OT      Balance Skills Training    Sitting-Level of Assistance Close supervision  -AC      Sitting-Balance Support Feet supported  -AC      Sitting-Balance Activities Trunk control activities  -AC      Standing-Level of Assistance Moderate assistance  -AC      Static Standing Balance Support Right upper extremity supported;Left upper extremity supported  -AC      Standing-Balance Activities Weight Shift R-L  -AC      Recorded by [AC] Ludmila Vargas OT      Therapy Exercises    Bilateral Upper Extremity AROM:;10 reps;shoulder extension/flexion  -AC      Recorded by [AC] Ludmila Vargas OT      Positioning and Restraints    Pre-Treatment Position in bed  -AC      Post Treatment Position chair  -AC      In Chair reclined;call light within reach;encouraged to call for assist;exit alarm on;with family/caregiver;legs elevated  -AC      Recorded by [AC] Ludmila Vargas, OT        User Key  (r) = Recorded By, (t) = Taken By, (c) = Cosigned By    Initials Name Effective Dates    MIRIAM Amaris Lazo, PT 06/19/15 -     AC Ludmila Vargas, OT 06/23/15 -     SH Johnna Esposito, PT 06/19/15 -     SK Antonina Albarado, OTR 05/18/15 -                 OT Goals       01/11/17 1139 01/09/17 1100 01/05/17 1435    Transfer Training OT LTG    Transfer Training OT LTG, Outcome goal ongoing  -SK goal ongoing  -AC goal ongoing   Pt declined fxl mobility this date d/t just having PT  -TA    Patient Education OT LTG    Patient Education OT LTG Outcome goal ongoing  -SK goal ongoing  -AC goal ongoing    Progressing with HEP, adaptive breathing this date.  -TA    LB Dressing OT LTG    LB Dressing Goal OT LTG, Outcome goal ongoing  -SK goal ongoing  -AC goal ongoing  -TA    UB Dressing OT LTG    UB Dressing Goal OT LTG, Outcome goal ongoing  -SK goal ongoing  -AC goal ongoing  -TA      01/03/17 1608          Transfer Training OT LTG    Transfer Training OT LTG, Date Established 01/03/17  -AR      Transfer Training OT LTG, Time to Achieve 1 wk  -AR      Transfer Training OT LTG, Activity Type sit to stand/stand to sit;toilet  -AR      Transfer Training OT LTG, [place] Level verbal cues required;contact guard assist  -AR      Transfer Training OT LTG, Assist Device commode, bedside;walker, rolling  -AR      Patient Education OT LTG    Patient Education OT LTG, Date Established 01/03/17  -AR      Patient Education OT LTG, Time to Achieve 1 wk  -AR      Patient Education OT LTG, Education Type HEP;home safety;energy conservation;work simplification;adaptive breathing  -AR      Patient Education OT LTG, Education Understanding demonstrates adequately;verbalizes understanding  -AR      LB Dressing OT LTG    LB Dressing Goal OT LTG, Date Established 01/03/17  -AR      LB Dressing Goal OT LTG, Time to Achieve by discharge  -AR      LB Dressing Goal OT LTG, Activity Type Pt will complete LB dressing task   -AR      LB Dressing Goal OT LTG, [place] Level verbal cues required;moderate assist (50% patient effort)  -AR      LB Dressing Goal OT LTG, Adaptive Equipment --   AE PRN  -AR      UB Dressing OT LTG    UB Dressing Goal OT LTG, Date Established 01/03/17  -AR      UB Dressing Goal OT LTG, Time to Achieve by discharge  -AR      UB Dressing Goal OT LTG, Activity Type Pt will complete UB dressing task   -AR      UB Dressing Goal OT LTG, [place] Level verbal cues required;supervision required  -AR        User Key  (r) = Recorded By, (t) = Taken By, (c) = Cosigned By    Initials Name Provider Type    JOSH MONTANEZ  Sam, OT Occupational Therapist    SK Antonina Albarado, OTR Occupational Therapist    AR Cristy Garcia, OT Occupational Therapist    RORY Ryan, OT Occupational Therapist          Occupational Therapy Education     Title: PT OT SLP Therapies (Active)     Topic: Occupational Therapy (Active)     Point: ADL training (Active)    Description: Instruct learner(s) on proper safety adaptation and remediation techniques during self care or transfers.   Instruct in proper use of assistive devices.    Learning Progress Summary    Learner Readiness Method Response Comment Documented by Status   Patient Acceptance E,TB,D NR reinforcement for grasp pattern on cup and utinsils to improve ability to self-feed; positioning for safety with feeding SK 01/11/17 1340 Active    Acceptance E VU  KB 01/10/17 0355 Done    Acceptance E NR benefits of activity, safety with transfers AC 01/09/17 1100 Active    Eager ED NR  DM 01/03/17 1634 Active    Eager ANNIKATB,D VU,NR Education provided on ADL retraining, transfer traiing, bed mobility, goals of care AR 01/03/17 1607 Done   Family Emmanuel ROBLEROD NR  DM 01/03/17 1634 Active    Eager ETB,D VU,NR Education provided on ADL retraining, transfer traiing, bed mobility, goals of care AR 01/03/17 1607 Done               Point: Home exercise program (Done)    Description: Instruct learner(s) on appropriate technique for monitoring, assisting and/or progressing therapeutic exercises/activities.    Learning Progress Summary    Learner Readiness Method Response Comment Documented by Status   Patient Acceptance E VU  KB 01/10/17 0355 Done    Acceptance E,D VU,DU,NR Educated on daily HEP and ADL participation benefits. AN 01/07/17 1118 Done    Acceptance E,TB,D VU,NR BUE HEP, adaptive breathing technique to assist with mgt of SOA. TA 01/05/17 1434 Done    [place] ED NR  DM 01/03/17 1634 Active    Eager E,TB,D VU,NR Education provided on ADL retraining, transfer traiing, bed mobility, goals of  care AR 01/03/17 1607 Done   Family Acceptance E,D VU,DU,NR Educated on daily HEP and ADL participation benefits. AN 01/07/17 1118 Done    Acceptance ETB,D VU,NR BUE HEP, adaptive breathing technique to assist with mgt of SOA. TA 01/05/17 1434 Done    [place] ED NR  DM 01/03/17 1634 Active    Eager E,TB,D VU,NR Education provided on ADL retraining, transfer traiing, bed mobility, goals of care AR 01/03/17 1607 Done               Point: Precautions (Active)    Description: Instruct learner(s) on prescribed precautions during self-care and functional transfers.    Learning Progress Summary    Learner Readiness Method Response Comment Documented by Status   Patient Acceptance E VU  KB 01/10/17 0355 Done    Emmanuel ROBLEROD NR  DM 01/03/17 1634 Active    Emmanuel ETB,D VU,NR Education provided on ADL retraining, transfer traiing, bed mobility, goals of care AR 01/03/17 1607 Done   Family Emmanuel ED NR  DM 01/03/17 1634 Active    Emmanuel ROBLEROTB,D VU,NR Education provided on ADL retraining, transfer traiing, bed mobility, goals of care AR 01/03/17 1607 Done               Point: Body mechanics (Done)    Description: Instruct learner(s) on proper positioning and spine alignment during self-care, functional mobility activities and/or exercises.    Learning Progress Summary    Learner Readiness Method Response Comment Documented by Status   Patient Acceptance E VU  KB 01/10/17 0355 Done    Acceptance ED VU,DU,NR Educated on daily HEP and ADL participation benefits. AN 01/07/17 1118 Done    [place] ED NR  DM 01/03/17 1634 Active    Eager ETB,D VU,NR Education provided on ADL retraining, transfer traiing, bed mobility, goals of care AR 01/03/17 1607 Done   Family Acceptance ED VU,DU,NR Educated on daily HEP and ADL participation benefits. AN 01/07/17 1118 Done    Eager ED NR  DM 01/03/17 1634 Active    Eager E,TB,D VU,NR Education provided on ADL retraining, transfer traiing, bed mobility, goals of care AR 01/03/17 1607 Done                       User Key     Initials Effective Dates Name Provider Type Discipline    AC 06/23/15 -  Ludmila Vargas, OT Occupational Therapist OT    SK 05/18/15 -  Antonina Albarado, OTR Occupational Therapist OT    DM 06/19/15 -  Kiera Bennett, PT Physical Therapist PT    AN 06/22/15 -  Shy Ridley, OT Occupational Therapist OT    AR 06/22/15 -  Cristy Garcia, OT Occupational Therapist OT    TA 03/14/16 -  Fito Ryan, OT Occupational Therapist OT    KB 06/16/16 -  Briana Dietz, RN Registered Nurse Nurse                  OT Recommendation and Plan  Anticipated Discharge Disposition: skilled nursing facility  Therapy Frequency: daily (per priority policy)  Plan of Care Review  Plan Of Care Reviewed With: patient  Progress: progress toward functional goals as expected  Outcome Summary/Follow up Plan: pt states not being able to feed herself, OT issued sippy cup for improving grasp on cup to increase independence however pt with such significant fatgue and weakness that she is requiring significant assist ([place])         Outcome Measures       01/11/17 1139 01/10/17 1005 01/09/17 1005    How much help from another person do you currently need...    Turning from your back to your side while in flat bed without using bedrails?  3  -SH 3  -MIRIAM    Moving from lying on back to sitting on the side of a flat bed without bedrails?  3  -SH 2  -MIRIAM    Moving to and from a bed to a chair (including a wheelchair)?  3  -SH 3  -MIRIAM    Standing up from a chair using your arms (e.g., wheelchair, bedside chair)?  2  -SH 2  -MIRIAM    Climbing 3-5 steps with a railing?  1  -SH 1  -MIRIAM    To walk in hospital room?  2  -SH 2  -MIRIAM    AM-PAC 6 Clicks Score  14  -SH 13  -MIRIAM    How much help from another is currently needed...    Putting on and taking off regular lower body clothing? 2  -SK      Bathing (including washing, rinsing, and drying) 2  -SK      Toileting (which includes using toilet bed pan or urinal) 1  -SK      Putting on and  taking off regular upper body clothing 2  -SK      Taking care of personal grooming (such as brushing teeth) 2  -SK      Eating meals 2  -SK      Score 11  -SK      Functional Assessment    Outcome Measure Options  AM-PAC 6 Clicks Basic Mobility (PT)  - AM-PAC 6 Clicks Basic Mobility (PT)  -MIRIAM      01/09/17 0940          How much help from another is currently needed...    Putting on and taking off regular lower body clothing? 2  -AC      Bathing (including washing, rinsing, and drying) 2  -AC      Toileting (which includes using toilet bed pan or urinal) 1  -AC      Putting on and taking off regular upper body clothing 3  -AC      Taking care of personal grooming (such as brushing teeth) 3  -AC      Eating meals 4  -AC      Score 15  -AC      Functional Assessment    Outcome Measure Options AM-PAC 6 Clicks Daily Activity (OT)  -AC        User Key  (r) = Recorded By, (t) = Taken By, (c) = Cosigned By    Initials Name Provider Type    MIRIAM Amaris Lazo, PT Physical Therapist    AC Ludmila Vargas, OT Occupational Therapist     Johnna Esposito, PT Physical Therapist    SK Antonina Albarado, OTR Occupational Therapist           Time Calculation:         Time Calculation- OT       01/11/17 1345          Time Calculation- OT    OT Start Time 1139  -SK      Total Timed Code Minutes- OT 23 minute(s)  -SK      OT Received On 01/11/17  -SK      OT Goal Re-Cert Due Date 01/17/17  -SK        User Key  (r) = Recorded By, (t) = Taken By, (c) = Cosigned By    Initials Name Provider Type    SK Antonina Albarado, OTR Occupational Therapist           Therapy Charges for Today     Code Description Service Date Service Provider Modifiers Qty    [id number]  OT THERAPEUTIC ACT EA 15 MIN 1/11/2017 DIONNE Galvez GO 2               DIONNE Kam  1/11/2017

## 2017-01-11 NOTE — PLAN OF CARE
Problem: Patient Care Overview (Adult)  Goal: Plan of Care Review  Outcome: Ongoing (interventions implemented as appropriate)    01/11/17 1626   Coping/Psychosocial Response Interventions   Plan Of Care Reviewed With patient   Patient Care Overview   Progress progress toward functional goals as expected   Outcome Evaluation   Outcome Summary/Follow up Plan Patient reported that she has new throat pain. She was able to amb 15 feet from one side of the bed to the chair with min A for walker management and VCs for sequencing. She will continue to benefit from skilled PT in order to optimize strengthening and progress towards PLOF.          Problem: Inpatient Physical Therapy  Goal: Bed Mobility Goal LTG- PT    01/04/17 1042 01/11/17 1626   Bed Mobility PT LTG   Bed Mobility PT LTG, Date Established 01/03/17 --    Bed Mobility PT LTG, Time to Achieve 1 wk --    Bed Mobility PT LTG, Activity Type all bed mobility --    Bed Mobility PT LTG, [place] Level independent --    Bed Mobility PT LTG, Date Goal Reviewed --  01/11/17   Bed Mobility PT LTG, Outcome --  goal ongoing       Goal: Transfer Training Goal 1 LTG- PT    01/04/17 1042 01/11/17 1626   Transfer Training PT LTG   Transfer Training PT LTG, Date Established 01/03/17 --    Transfer Training PT LTG, Time to Achieve 1 wk --    Transfer Training PT LTG, Activity Type sit to stand/stand to sit;bed to chair /chair to bed;toilet --    Transfer Training PT LTG, [place] Level supervision required --    Transfer Training PT LTG, Assist Device walker, rolling --    Transfer Training PT LTG, Date Goal Reviewed --  01/11/17   Transfer Training PT LTG, Outcome --  goal ongoing       Goal: Gait Training Goal LTG- PT  Outcome: Ongoing (interventions implemented as appropriate)    01/04/17 1042 01/11/17 1626   Gait Training PT LTG   Gait Training Goal PT LTG, Date Established 01/03/17 --    Gait Training Goal PT LTG, Time to Achieve 1 wk --    Gait Training Goal PT  LTG, [place] Level contact guard assist --    Gait Training Goal PT LTG, Assist Device walker, rolling --    Gait Training Goal PT LTG, Distance to Achieve 30 --    Gait Training Goal PT LTG, Date Goal Reviewed --  01/11/17   Gait Training Goal PT LTG, Outcome --  goal ongoing

## 2017-01-11 NOTE — PLAN OF CARE
Problem: Fall Risk (Adult)  Goal: Absence of Falls  Outcome: Ongoing (interventions implemented as appropriate)    01/11/17 1847   Fall Risk (Adult)   Absence of Falls making progress toward outcome         Problem: Cardiac: Heart Failure (Adult)  Goal: Signs and Symptoms of Listed Potential Problems Will be Absent or Manageable (Cardiac: Heart Failure)  Outcome: Ongoing (interventions implemented as appropriate)    01/10/17 1722   Cardiac: Heart Failure   Problems Assessed (Heart Failure) all   Problems Present (Heart Failure) decreased quality of life;dysrhythmia/arrhythmia;fluid/electrolyte imbalance;functional decline/self-care deficit;respiratory compromise;situational response         Problem: Patient Care Overview (Adult)  Goal: Plan of Care Review  Outcome: Ongoing (interventions implemented as appropriate)    01/11/17 1626 01/11/17 1804   Coping/Psychosocial Response Interventions   Plan Of Care Reviewed With --  patient;daughter   Patient Care Overview   Progress progress toward functional goals as expected --    Outcome Evaluation   Outcome Summary/Follow up Plan Patient reported that she has new throat pain. She was able to amb 15 feet from one side of the bed to the chair with min A for walker management and VCs for sequencing. She will continue to benefit from skilled PT in order to optimize strengthening and progress towards PLOF.  --        Goal: Discharge Needs Assessment  Outcome: Ongoing (interventions implemented as appropriate)    01/03/17 0947 01/03/17 1430 01/07/17 0357   Discharge Needs Assessment   Concerns To Be Addressed --  --  basic needs concerns;cognitive/perceptual concerns;discharge planning concerns   Readmission Within The Last 30 Days --  --  no previous admission in last 30 days   Equipment Needed After Discharge --  --  walker, rolling;wheelchair   Discharge Facility/Level Of Care Needs --  --  nursing facility, skilled   Current Discharge Risk --  --  dependent with  mobility/activities of daily living   Discharge Disposition --  --  still a patient;skilled nursing facility   Discharge Planning Comments Pt is from assisted living. She will need to be back to her baseline to return. --  --    Current Health   Anticipated Changes Related to Illness --  --  inability to care for self   Living Environment   Transportation Available --  car;family or friend will provide --    Self-Care   Equipment Currently Used at Home --  wheelchair;walker, rolling;cane, straight  (tripod R wx) --          Problem: Skin Integrity Impairment, Risk/Actual (Adult)  Goal: Identify Related Risk Factors and Signs and Symptoms  Outcome: Outcome(s) achieved Date Met:  01/11/17 01/07/17 1553   Skin Integrity Impairment, Risk/Actual   Skin Integrity Impairment, Risk/Actual: Related Risk Factors age extremes;immobility;edema;fluid/nutrition status   Signs and Symptoms (Skin Integrity Impairment) bulla/blister/vesicle       Goal: Skin Integrity/Wound Healing  Outcome: Ongoing (interventions implemented as appropriate)    01/11/17 0321   Skin Integrity Impairment, Risk/Actual (Adult)   Skin Integrity/Wound Healing making progress toward outcome

## 2017-01-11 NOTE — PLAN OF CARE
Problem: Skin Integrity Impairment, Risk/Actual (Adult)  Goal: Identify Related Risk Factors and Signs and Symptoms  Outcome: Ongoing (interventions implemented as appropriate)    01/07/17 1553   Skin Integrity Impairment, Risk/Actual   Skin Integrity Impairment, Risk/Actual: Related Risk Factors age extremes;immobility;edema;fluid/nutrition status   Signs and Symptoms (Skin Integrity Impairment) bulla/blister/vesicle       Goal: Skin Integrity/Wound Healing  Outcome: Ongoing (interventions implemented as appropriate)

## 2017-01-11 NOTE — PROGRESS NOTES
Acute Care - Physical Therapy Treatment Note  [facility]     Patient Name: Avril Allen  : 1921  MRN: [id number]  Today's Date: 2017  Onset of Illness/Injury or Date of Surgery Date: 17  Date of Referral to PT: 17  Referring Physician: Jackelin Medley MD    Admit Date: 2017    Visit Dx:    ICD-10-CM ICD-9-CM   1. Acute diastolic (congestive) heart failure I50.31 428.31     428.0   2. Bronchospasm J98.01 519.11   3. Acute respiratory failure with hypoxia J96.01 518.81   4. Community acquired pneumonia J18.9 486   5. Impaired mobility and ADLs Z74.09 799.89   6. Impaired functional mobility, balance, gait, and endurance Z74.09 V49.89   7. Chronic atrial fibrillation I48.2 427.31   8. Essential hypertension I10 401.9     Patient Active Problem List   Diagnosis   • Hypertension   • Arthritis   • Altered mental status   • Transient cerebral ischemia   • Atrial fibrillation, no anticoags due to hx GIB and fall risk   • Pacemaker at end of battery life   • Pacemaker-dependent due to native cardiac rhythm insufficient to support life   • Failure to thrive in adult   • Acute blood loss anemia   • GI bleed   • Acute diastolic (congestive) heart failure   • Pulmonary hypertension   • Acute hypoxic respiratory failure   • Healthcare Associated Pneumonia   • Valvular heart disease   • Hypokalemia   • Chronic anemia               Adult Rehabilitation Note       17 1540 17 1139 01/10/17 1005    Rehab Assessment/Intervention    Discipline (P)  physical therapist  -SG occupational therapist  -SK physical therapist  -SH    Document Type (P)  therapy note (daily note)  -SG therapy note (daily note)  -SK therapy note (daily note)  -    Subjective Information (P)  agree to therapy;complains of;pain  -SG no complaints;agree to therapy  -SK no complaints;agree to therapy  -SH    Patient Effort, Rehab Treatment (P)  good  -SG good  -SK good  -SH    Symptoms Noted During/After Treatment (P)  fatigue   -SG fatigue  -SK fatigue  -    Precautions/Limitations (P)  fall precautions;oxygen therapy device and L/min;other (see comments)   Impaired skin integrity  -SG fall precautions;oxygen therapy device and L/min   impaired skin integrity  -SK     Equipment Issued to Patient  feeding equipment   sippy cup, improved grasp however still weak in BUE's  -SK     Recorded by [SG] Ana Jose, PT Student [SK] Antonina Albarado, OTR [] Johnna Esposito, PT    Vital Signs    Post Systolic BP Rehab (P)  128  -SG      Post Treatment Diastolic BP (P)  92  -SG      Pretreatment Heart Rate (beats/min) (P)  82  -SG      Intratreatment Heart Rate (beats/min) (P)  114  -SG      Posttreatment Heart Rate (beats/min) (P)  84  -SG      Pre SpO2 (%) (P)  --   O2 moniter not working  -SG      Post SpO2 (%) (P)  --   Could not get good reading despite multiple attempts  -SG      Pre Patient Position (P)  Supine  -SG      Intra Patient Position (P)  Standing  -SG      Post Patient Position (P)  Sitting  -SG      Recorded by [SG] Ana Jose, PT Student      Pain Assessment    Pain Assessment (P)  Martin-Baker FACES  -SG No/denies pain  -SK No/denies pain  -SH    Martin-Muniz FACES Pain Rating (P)  2  -SG      Pain Type (P)  Acute pain  -SG      Pain Location (P)  Throat  -SG      Patient's Stated Pain Goal (P)  No pain  -SG      Pain Intervention(s) (P)  Other (Comment)   Drink   -SG      Recorded by [SG] Ana Jose, PT Student [SK] Antnoina Albarado, OTR [] Johnna Esposito, PT    Cognitive Assessment/Intervention    Current Cognitive/Communication Assessment (P)  functional  -SG functional  -SK impaired  -    Orientation Status (P)  oriented x 4  -SG oriented to;person;situation  -SK oriented to;person  -SH    Follows Commands/Answers Questions (P)  100% of the time  -% of the time;able to follow single-step instructions  -SK able to follow single-step instructions;100% of the time;needs cueing  -    Personal Safety  (P)  mild impairment  - mild impairment  -SK mild impairment  -    Personal Safety Interventions (P)  elopement precautions initiated;fall prevention program maintained;gait belt;muscle strengthening facilitated;nonskid shoes/slippers when out of bed;supervised activity  - fall prevention program maintained  -SK fall prevention program maintained;gait belt;nonskid shoes/slippers when out of bed  -    Recorded by [SG] Ana Jose, PT Student [SK] Antonina Albarado OTR [SH] Johnna Esposito, PT    Bed Mobility, Assessment/Treatment    Bed Mobility, Assistive Device (P)  bed rails;head of bed elevated  -  head of bed elevated;bed rails  -    Bed Mobility, Roll Right, [place]   supervision required  -    Bed Mobility, Scoot/Bridge, [place] (P)  minimum assist (75% patient effort)  -  contact guard assist;verbal cues required  -    Bed Mob, Supine to Sit, [place] (P)  contact guard assist  -  supervision required;verbal cues required  -    Bed Mobility, Safety Issues (P)  decreased use of arms for pushing/pulling;decreased use of legs for bridging/pushing  -  cognitive deficits limit understanding  -    Bed Mobility, Impairments (P)  strength decreased;impaired balance  -SG  strength decreased;impaired balance  -    Bed Mobility, Comment   Pt anxious to get out of bed this am.  -    Recorded by [SG] Ana oJse, PT Student  [] Johnna Esposito, PT    Transfer Assessment/Treatment    Transfers, Sit-Stand [place] (P)  contact guard assist;2 person assist required;verbal cues required   Sequencing and hand placement  -  minimum assist (75% patient effort);2 person assist required;verbal cues required;nonverbal cues required (demo/gesture)  -    Transfers, Stand-Sit [place] (P)  verbal cues required   Sequencing and hand placement  -  minimum assist (75% patient effort);2 person assist required;verbal cues required;nonverbal cues required (demo/gesture)   -SH    Transfers, Sit-Stand-Sit, Assist Device   rolling walker  -SH    Transfer, Safety Issues (P)  balance decreased during turns;step length decreased;weight-shifting ability decreased;sequencing ability decreased  -SG  balance decreased during turns;sequencing ability decreased;step length decreased  -    Transfer, Impairments   strength decreased;impaired balance  -    Transfer, Comment  pt refused  -SK Pt performed sit to stand at edge of bed to don brief for transfer and OOB. Pt returned to sitting. Performed sit to stand a second time and transferred to chair with RW. Pt posterior lean, anxious to sit down.  -    Recorded by [SG] Ana Jose, PT Student [SK] Antonina Albarado, OTR [SH] Johnna Esposito, PT    Gait Assessment/Treatment    Gait, [place] Level (P)  minimum assist (75% patient effort);verbal cues required   sequencing   -SG  moderate assist (50% patient effort);2 person assist required;verbal cues required;nonverbal cues required (demo/gesture)  -    Gait, Assistive Device (P)  rolling walker  -SG  rolling walker  -    Gait, Distance (Feet) (P)  15  -SG  2  -SH    Gait, Gait Pattern Analysis   swing-to gait  -    Gait, Gait Deviations (P)  ranjit decreased;forward flexed posture;narrow base;step length decreased;decreased heel strike  -SG  ranjit decreased;forward flexed posture  -    Gait, Safety Issues (P)  step length decreased;weight-shifting ability decreased;supplemental O2  -SG  balance decreased during turns;sequencing ability decreased;step length decreased;loses balance backward;supplemental O2  -    Gait, Impairments (P)  strength decreased;impaired balance  -SG      Gait, Comment   Steps to chair only.  -    Recorded by [SG] Ana Jose, PT Student  [SH] Johnna Esposito, PT    ADL Assessment/Intervention    Additional Documentation  Self-Feeding Assessment/Training (Group)  -SK     Recorded by  [SK] Antonina Albarado, OTR     Self-Feeding  Assessment/Training    Self-Feeding Assess/Train, Assist Device  other (see comments)  -SK     Self-Feeding Assess/Train, Position  supported sitting  -SK     Self-Feeding Assess/Train, [place]  hand over hand  -SK     Self-Feeding Assess/Train, Impairments  strength decreased;coordination impaired  -SK     Self-Feeding Assess/Train, Comment  issued sippy cup per nsg request-pt with improved grasp however d/t weakness of BUE's, pt still demo's some difficulty. FMC and grasp strength preventing self-feeding with utinsils this date  -SK     Recorded by  [SK] Antonina Albarado OTR     Balance Skills Training    Sitting-Level of Assistance (P)  Close supervision  -  Contact guard  -    Sitting-Balance Support (P)  Right upper extremity supported;Left upper extremity supported  -  Feet supported;Right upper extremity supported;Left upper extremity supported  -    Sitting-Balance Activities (P)  Trunk control activities   LE exercises  -  --   Maintain midline  -    Sitting # of Minutes (P)  5  -SG  5  -SH    Standing-Level of Assistance (P)  Close supervision  -      Static Standing Balance Support (P)  assistive device  -      Standing-Balance Activities (P)  Weight Shift A-P;Weight Shift R-L  -SG      Standing Balance # of Minutes (P)  1  -SG      Gait Balance-Level of Assistance (P)  Minimum assistance  -      Gait Balance Support (P)  assistive device  -      Gait Balance Activities (P)  side-stepping;backwards  -      Gait Balance # of Minutes (P)  8  -SG      Recorded by [SG] Ana Jose, PT Student  [] Johnna Esposito, PT    Therapy Exercises    Bilateral Lower Extremities (P)  AROM:;sitting;15 reps;LAQ;hip flexion;ankle pumps/circles;10 reps;hip abduction/adduction  -  AROM:;10 reps;sitting;ankle pumps/circles;LAQ;hip flexion  -    Bilateral Upper Extremity   AROM:;10 reps;sitting;elbow flexion/extension;shoulder extension/flexion  -    Recorded by [SG] Ana Jose,  PT Student  [SH] Johnna Esposito, PT    Positioning and Restraints    Pre-Treatment Position (P)  in bed  -SG in bed  -SK in bed  -SH    Post Treatment Position (P)  chair  -SG bed  -SK chair  -SH    In Bed  notified nsg;supine;call light within reach;encouraged to call for assist;exit alarm on  -SK     In Chair (P)  reclined;call light within reach;encouraged to call for assist;exit alarm on;with family/caregiver  -SG  reclined;notified nsg;call light within reach;encouraged to call for assist;exit alarm on;legs elevated  -    Recorded by [SG] Ana Jose, PT Student [SK] Antonina Albarado, OTR [SH] Johnna Esposito, PT      01/09/17 1005 01/09/17 0940       Rehab Assessment/Intervention    Discipline physical therapist  -MIRIAM occupational therapist  -AC     Document Type therapy note (daily note);progress note  -MIRIAM therapy note (daily note)  -AC     Subjective Information no complaints;agree to therapy  -MIRIAM agree to therapy;complains of;fatigue  -AC     Patient Effort, Rehab Treatment adequate   Pt had just finished working with OT  -MIRIAM good  -AC     Symptoms Noted During/After Treatment  fatigue  -AC     Precautions/Limitations  fall precautions;oxygen therapy device and L/min  -AC     Recorded by [MIRIAM] Amaris Lazo, PT [AC] Ludmila Vargas OT     Vital Signs    Pre SpO2 (%)  94  -AC     O2 Delivery Pre Treatment  supplemental O2  -AC     Intra SpO2 (%)  93  -AC     O2 Delivery Intra Treatment  supplemental O2  -AC     Post SpO2 (%)  94  -AC     O2 Delivery Post Treatment  supplemental O2  -AC     Recorded by  [AC] Ludmila Vargas OT     Pain Assessment    Pain Assessment No/denies pain  -MIRIAM No/denies pain  -AC     Recorded by [MIRIAM] Amaris Lazo, PT [AC] Ludmila Vargas OT     Cognitive Assessment/Intervention    Current Cognitive/Communication Assessment impaired  -MIRIAM impaired  -AC     Orientation Status oriented to;person  -MIRIAM oriented to;person  -AC     Follows Commands/Answers  Questions 100% of the time;able to follow multi-step instructions;needs cueing;needs repetition  -MIRIAM 75% of the time;needs cueing;needs repetition  -AC     Personal Safety mild impairment  -MIRIAM mild impairment  -AC     Personal Safety Interventions muscle strengthening facilitated;fall prevention program maintained  -MIRIAM elopement precautions initiated;fall prevention program maintained;gait belt;nonskid shoes/slippers when out of bed  -AC     Recorded by [MIRIAM] Amaris Lazo, PT [AC] Ludmila Vargas, OT     Bed Mobility, Assessment/Treatment    Bed Mobility, Assistive Device  bed rails;head of bed elevated  -AC     Bed Mob, Supine to Sit, [place]  verbal cues required;moderate assist (50% patient effort)  -AC     Bed Mobility, Impairments  strength decreased;impaired balance  -AC     Recorded by  [AC] Ludmila Vargas OT     Transfer Assessment/Treatment    Transfers, Bed-Chair [place]  verbal cues required;moderate assist (50% patient effort)  -AC     Transfers, Sit-Stand [place]  verbal cues required;moderate assist (50% patient effort)  -AC     Transfers, Stand-Sit [place]  verbal cues required;moderate assist (50% patient effort)  -AC     Transfers, Sit-Stand-Sit, Assist Device  --   UE support and gait belt  -AC     Transfer, Impairments  impaired balance;strength decreased  -AC     Transfer, Comment  Cues for upright position and to safely wait until she feels legs on chair before sitting  -AC     Recorded by  [AC] Ludmila Vargas, OT     Functional Mobility    Functional Mobility- Comment  too fatigued to attempt  -AC     Recorded by  [AC] Ludmila Vargas, ROGELIO     Balance Skills Training    Sitting-Level of Assistance  Close supervision  -AC     Sitting-Balance Support  Feet supported  -AC     Sitting-Balance Activities  Trunk control activities  -AC     Standing-Level of Assistance  Moderate assistance  -AC     Static Standing Balance Support  Right upper extremity supported;Left upper  extremity supported  -AC     Standing-Balance Activities  Weight Shift R-L  -AC     Recorded by  [AC] Ludmila Vargas OT     Therapy Exercises    Bilateral Lower Extremities AAROM:;10 reps;sitting;ankle pumps/circles;heel slides;hip abduction/adduction;knee flexion;SLR;quad sets  -MIRIAM      Bilateral Upper Extremity  AROM:;10 reps;shoulder extension/flexion  -AC     Recorded by [MIRIAM] Amaris Lazo, PT [AC] Ludmila Vargas OT     Positioning and Restraints    Pre-Treatment Position sitting in chair/recliner  -MIRIAM in bed  -AC     Post Treatment Position chair  -MIRIAM chair  -AC     In Chair reclined;call light within reach;encouraged to call for assist;exit alarm on;with family/caregiver;waffle cushion;legs elevated  -MIRIAM reclined;call light within reach;encouraged to call for assist;exit alarm on;with family/caregiver;legs elevated  -AC     Recorded by [MIRIAM] Amaris Lazo, PT [AC] Ludmila Vargas OT       User Key  (r) = Recorded By, (t) = Taken By, (c) = Cosigned By    Initials Name Effective Dates    MIRIAM Amaris Lazo, PT 06/19/15 -     AC Ludmila Vargas, OT 06/23/15 -     SH Johnna Esposito, PT 06/19/15 -     SK Antonina Albarado, OTR 05/18/15 -     SG Ana Jose, PT Student 12/19/16 -                 IP PT Goals       01/11/17 1626 01/09/17 1005 01/08/17 1358    Bed Mobility PT LTG    Bed Mobility PT LTG, Date Goal Reviewed (P)  01/11/17  -SG 01/09/17  -MIRIAM     Bed Mobility PT LTG, Outcome (P)  goal ongoing  -SG goal ongoing  -MIRIAM goal ongoing  -UD    Transfer Training PT LTG    Transfer Training PT  LTG, Date Goal Reviewed (P)  01/11/17  -SG 01/09/17  -MIRIAM     Transfer Training PT LTG, Outcome (P)  goal ongoing  -SG goal ongoing  -MIRIAM goal ongoing  -UD    Gait Training PT LTG    Gait Training Goal PT LTG, Date Goal Reviewed (P)  01/11/17  -SG 01/09/17  -MIRIAM     Gait Training Goal PT LTG, Outcome (P)  goal ongoing  -SG goal ongoing  -MIRIAM goal ongoing  -UD      01/05/17 1357 01/04/17 1042  01/03/17 1634    Bed Mobility PT LTG    Bed Mobility PT LTG, Date Established  01/03/17  -DM 01/03/17  -DM    Bed Mobility PT LTG, Time to Achieve  1 wk  -DM 1 wk  -DM    Bed Mobility PT LTG, Activity Type  all bed mobility  -DM all bed mobility  -DM    Bed Mobility PT LTG, [place] Level  independent  -DM independent  -DM    Bed Mobility PT LTG, Outcome goal ongoing  -MB goal ongoing  -DM     Transfer Training PT LTG    Transfer Training PT LTG, Date Established  01/03/17  -DM 01/03/17  -DM    Transfer Training PT LTG, Time to Achieve  1 wk  -DM 1 wk  -DM    Transfer Training PT LTG, Activity Type  sit to stand/stand to sit;bed to chair /chair to bed;toilet  -DM bed to chair /chair to bed;sit to stand/stand to sit;toilet  -DM    Transfer Training PT LTG, [place] Level  supervision required  -DM supervision required  -DM    Transfer Training PT LTG, Assist Device  walker, rolling  -DM walker, rolling  -DM    Transfer Training PT LTG, Outcome goal ongoing  -MB goal ongoing  -DM     Gait Training PT LTG    Gait Training Goal PT LTG, Date Established  01/03/17  -DM 01/03/17  -DM    Gait Training Goal PT LTG, Time to Achieve  1 wk  -DM 1 wk  -DM    Gait Training Goal PT LTG, [place] Level  contact guard assist  -DM contact guard assist  -DM    Gait Training Goal PT LTG, Assist Device  walker, rolling  -DM walker, rolling  -DM    Gait Training Goal PT LTG, Distance to Achieve  30  -DM 30   w/ stable VS  -DM    Gait Training Goal PT LTG, Outcome goal ongoing  -MB goal ongoing  -DM       User Key  (r) = Recorded By, (t) = Taken By, (c) = Cosigned By    Initials Name Provider Type    MIRIAM Lazo, PT Physical Therapist    MIRIAM Valladares, PTA Physical Therapy Assistant    NICHOLAS Bennett, PT Physical Therapist    GEORGES Mariano, PT Physical Therapist    SG Ana Jose, PT Student PT Student          Physical Therapy Education     Title: PT OT SLP Therapies (Active)     Topic:  Physical Therapy (Active)     Point: Mobility training (Active)    Learning Progress Summary    Learner Readiness Method Response Comment Documented by Status   Patient Acceptance E VU  SG 01/11/17 1626 Done    Acceptance E,D VU,NR HEP, transfer safety.  01/10/17 1036 Done    Acceptance E VU  KB 01/10/17 0355 Done    Acceptance E,D NR  UD 01/08/17 1357 Active    Acceptance E,D NR fall precautions, home safety, gait mechanics, HEP MB 01/05/17 1356 Active    Eager E,D NR  DM 01/04/17 1042 Active    Eager E,D NR  DM 01/03/17 1634 Active    Eager E VU  AP 01/03/17 0056 Done   Family Acceptance E,D NR  UD 01/08/17 1357 Active    Acceptance E,D NR fall precautions, home safety, gait mechanics, HEP MB 01/05/17 1356 Active    Eager E,D NR  DM 01/03/17 1634 Active               Point: Home exercise program (Active)    Learning Progress Summary    Learner Readiness Method Response Comment Documented by Status   Patient Acceptance E VU   01/11/17 1626 Done    Acceptance E,D VU,NR HEP, transfer safety.  01/10/17 1036 Done    Acceptance E VU  KB 01/10/17 0355 Done    Acceptance E,D NR  MIRIAM 01/09/17 1035 Active    Acceptance E,D NR  UD 01/08/17 1357 Active    Acceptance E,D NR fall precautions, home safety, gait mechanics, HEP MB 01/05/17 1356 Active    Eager E,D NR  DM 01/04/17 1042 Active    Eager E,D NR  DM 01/03/17 1634 Active    Eager E VU  AP 01/03/17 0056 Done   Family Acceptance E,D NR  UD 01/08/17 1357 Active    Acceptance E,D NR fall precautions, home safety, gait mechanics, HEP MB 01/05/17 1356 Active    Eager E,D NR  DM 01/03/17 1634 Active               Point: Body mechanics (Active)    Learning Progress Summary    Learner Readiness Method Response Comment Documented by Status   Patient Acceptance E VU   01/11/17 1626 Done    Acceptance E,D VU,NR HEP, transfer safety.  01/10/17 1036 Done    Acceptance E VU  KB 01/10/17 0355 Done    Acceptance E,D NR  UD 01/08/17 1357 Active    Acceptance E,D NR fall  precautions, home safety, gait mechanics, HEP MB 01/05/17 1356 Active    Eager E,D NR   01/04/17 1042 Active    Eager E,D NR   01/03/17 1634 Active    Eager E VU  AP 01/03/17 0056 Done   Family Acceptance E,D NR   01/08/17 1357 Active    Acceptance E,D NR fall precautions, home safety, gait mechanics, HEP MB 01/05/17 1356 Active    Eager E,D NR   01/03/17 1634 Active               Point: Precautions (Active)    Learning Progress Summary    Learner Readiness Method Response Comment Documented by Status   Patient Acceptance E VU  SG 01/11/17 1626 Done    Acceptance E,D VU,NR HEP, transfer safety.  01/10/17 1036 Done    Acceptance E VU   01/10/17 0355 Done    Acceptance E,D NR   01/08/17 1357 Active    Acceptance E,D NR fall precautions, home safety, gait mechanics, HEP MB 01/05/17 1356 Active    Eager E,D NR   01/04/17 1042 Active    Eager E,D NR   01/03/17 1634 Active    Eager E VU   01/03/17 0056 Done   Family Acceptance E,D NR   01/08/17 1357 Active    Acceptance E,D NR fall precautions, home safety, gait mechanics, HEP MB 01/05/17 1356 Active    Eager E,D NR   01/03/17 1634 Active                      User Key     Initials Effective Dates Name Provider Type Discipline     06/19/15 -  Amaris Lazo, PT Physical Therapist PT     06/22/15 -  Marisol Valladares, PTA Physical Therapy Assistant PT     06/19/15 -  Johnna Esposito, PT Physical Therapist PT     06/19/15 -  Kiera Bennett, PT Physical Therapist PT    MB 03/14/16 -  Joann Mariano, PT Physical Therapist PT     06/16/16 -  Robina Kimball, RN Registered Nurse Nurse     06/16/16 -  Briana Dietz, RN Registered Nurse Nurse     12/19/16 -  Ana Jose, PT Student PT Student PT                    PT Recommendation and Plan  Anticipated Discharge Disposition: skilled nursing facility  PT Frequency: daily  Plan of Care Review  Plan Of Care Reviewed With: (P) patient  Progress: (P) progress toward functional  goals as expected  Outcome Summary/Follow up Plan: (P) Patient  reported that she has new throat pain.  She was able to amb 15 feet from one side of the bed to the chair with min A for walker management and VCs for sequencing.  She will continue to benefit from skilled PT in order to optimize strengthening and progress towards PLOF.           Outcome Measures       01/11/17 1600 01/11/17 1139 01/10/17 1005    How much help from another person do you currently need...    Turning from your back to your side while in flat bed without using bedrails? (P)  3  -SG  3  -SH    Moving from lying on back to sitting on the side of a flat bed without bedrails? (P)  3  -SG  3  -SH    Moving to and from a bed to a chair (including a wheelchair)? (P)  3  -SG  3  -SH    Standing up from a chair using your arms (e.g., wheelchair, bedside chair)? (P)  3  -SG  2  -SH    Climbing 3-5 steps with a railing? (P)  1  -SG  1  -SH    To walk in hospital room? (P)  3  -SG  2  -SH    AM-PAC 6 Clicks Score (P)  16  -SG  14  -SH    How much help from another is currently needed...    Putting on and taking off regular lower body clothing?  2  -SK     Bathing (including washing, rinsing, and drying)  2  -SK     Toileting (which includes using toilet bed pan or urinal)  1  -SK     Putting on and taking off regular upper body clothing  2  -SK     Taking care of personal grooming (such as brushing teeth)  2  -SK     Eating meals  2  -SK     Score  11  -SK     Functional Assessment    Outcome Measure Options (P)  AM-PAC 6 Clicks Basic Mobility (PT)  -SG  AM-PAC 6 Clicks Basic Mobility (PT)  -SH      01/09/17 1005 01/09/17 0940       How much help from another person do you currently need...    Turning from your back to your side while in flat bed without using bedrails? 3  -MIRIAM      Moving from lying on back to sitting on the side of a flat bed without bedrails? 2  -MIRIAM      Moving to and from a bed to a chair (including a wheelchair)? 3  -MIRIAM       Standing up from a chair using your arms (e.g., wheelchair, bedside chair)? 2  -MIRIAM      Climbing 3-5 steps with a railing? 1  -MIRIAM      To walk in hospital room? 2  -MIRIAM      AM-PAC 6 Clicks Score 13  -MIRIAM      How much help from another is currently needed...    Putting on and taking off regular lower body clothing?  2  -AC     Bathing (including washing, rinsing, and drying)  2  -AC     Toileting (which includes using toilet bed pan or urinal)  1  -AC     Putting on and taking off regular upper body clothing  3  -AC     Taking care of personal grooming (such as brushing teeth)  3  -AC     Eating meals  4  -AC     Score  15  -AC     Functional Assessment    Outcome Measure Options AM-PAC 6 Clicks Basic Mobility (PT)  -MIRIAM AM-PAC 6 Clicks Daily Activity (OT)  -AC       User Key  (r) = Recorded By, (t) = Taken By, (c) = Cosigned By    Initials Name Provider Type    MIRIAM Lazo, PT Physical Therapist    AC Ludmila Vargas, OT Occupational Therapist    SH Johnna Esposito, PT Physical Therapist    SK Antonina Albarado, OTR Occupational Therapist    GEORGINA Jose, PT Student PT Student           Time Calculation:         PT Charges       01/11/17 1629          Time Calculation    Start Time (P)  1540  -      PT Received On (P)  01/11/17  -      PT Goal Re-Cert Due Date (P)  01/13/17  -      Time Calculation- PT    Total Timed Code Minutes- PT (P)  25 minute(s)  -        User Key  (r) = Recorded By, (t) = Taken By, (c) = Cosigned By    Initials Name Provider Type    GEORGINA Jose, PT Student PT Student          Therapy Charges for Today     Code Description Service Date Service Provider Modifiers Qty    93269538894 HC GAIT TRAINING EA 15 MIN 1/11/2017 Ana Jose, PT Student GP 1    41765566447 HC PT THER PROC EA 15 MIN 1/11/2017 Ana Jose, PT Student GP 1          PT G-Codes  Outcome Measure Options: (P) AM-PAC 6 Clicks Basic Mobility (PT)    Ana Jose, PT  Student  1/11/2017

## 2017-01-11 NOTE — PROGRESS NOTES
Continued Stay Note  [facility]     Patient Name: Avril Allen  MRN: [id number]  Today's Date: 1/11/2017    Admit Date: 1/2/2017          Discharge Plan       01/11/17 1616    Case Management/Social Work Plan    Plan SNF    Patient/Family In Agreement With Plan yes    Additional Comments I spoke with Mamie at [facility]. They have accepted the pt. They hope to have a bed as early as 1-12-17, if not they will have a bed 1-13-17. I should know in the am.              Discharge Codes     None        Expected Discharge Date and Time     Expected Discharge Date Expected Discharge Time    Jan 12, 2017 10:29 AM            Haily Shoemaker RN

## 2017-01-11 NOTE — PLAN OF CARE
Problem: Patient Care Overview (Adult)  Goal: Plan of Care Review  Outcome: Ongoing (interventions implemented as appropriate)    01/11/17 1139   Coping/Psychosocial Response Interventions   Plan Of Care Reviewed With patient   Outcome Evaluation   Outcome Summary/Follow up Plan pt states not being able to feed herself, OT issued sippy cup for improving grasp on cup to increase independence however pt with such significant fatgue and weakness that she is requiring significant assist ([place])          Problem: Inpatient Occupational Therapy  Goal: Transfer Training Goal 1 LTG- OT  Outcome: Ongoing (interventions implemented as appropriate)    01/03/17 1608 01/11/17 1139   Transfer Training OT LTG   Transfer Training OT LTG, Date Established 01/03/17 --    Transfer Training OT LTG, Time to Achieve 1 wk --    Transfer Training OT LTG, Activity Type sit to stand/stand to sit;toilet --    Transfer Training OT LTG, [place] Level verbal cues required;contact guard assist --    Transfer Training OT LTG, Assist Device commode, bedside;walker, rolling --    Transfer Training OT LTG, Outcome --  goal ongoing       Goal: Patient Education Goal LTG- OT  Outcome: Ongoing (interventions implemented as appropriate)    01/03/17 1608 01/11/17 1139   Patient Education OT LTG   Patient Education OT LTG, Date Established 01/03/17 --    Patient Education OT LTG, Time to Achieve 1 wk --    Patient Education OT LTG, Education Type HEP;home safety;energy conservation;work simplification;adaptive breathing --    Patient Education OT LTG, Education Understanding demonstrates adequately;verbalizes understanding --    Patient Education OT LTG Outcome --  goal ongoing       Goal: LB Dressing Goal LTG- OT  Outcome: Ongoing (interventions implemented as appropriate)    01/03/17 1608 01/11/17 1139   LB Dressing OT LTG   LB Dressing Goal OT LTG, Date Established 01/03/17 --    LB Dressing Goal OT LTG, Time to Achieve by discharge --    LB Dressing  Goal OT LTG, Activity Type Pt will complete LB dressing task  --    LB Dressing Goal OT LTG, [place] Level verbal cues required;moderate assist (50% patient effort) --    LB Dressing Goal OT LTG, Adaptive Equipment (AE PRN) --    LB Dressing Goal OT LTG, Outcome --  goal ongoing       Goal: UB Dressing Goal LTG- OT  Outcome: Ongoing (interventions implemented as appropriate)    01/03/17 1608 01/11/17 1139   UB Dressing OT LTG   UB Dressing Goal OT LTG, Date Established 01/03/17 --    UB Dressing Goal OT LTG, Time to Achieve by discharge --    UB Dressing Goal OT LTG, Activity Type Pt will complete UB dressing task  --    UB Dressing Goal OT LTG, [place] Level verbal cues required;supervision required --    UB Dressing Goal OT LTG, Outcome --  goal ongoing

## 2017-01-11 NOTE — PROGRESS NOTES
Continued Stay Note  [facility]     Patient Name: Avril Allen  MRN: [id number]  Today's Date: 1/11/2017    Admit Date: 1/2/2017          Discharge Plan       01/11/17 1623    Case Management/Social Work Plan    Additional Comments Daughter states pt will need a statement that she needs more rehab and supervision and is unable to return to her assisted living so they will not be charged at AL facility while the pt is in SNF care.      01/11/17 1616    Case Management/Social Work Plan    Plan SNF    Patient/Family In Agreement With Plan yes    Additional Comments I spoke with Mamie at [facility]. They have accepted the pt. They hope to have a bed as early as 1-12-17, if not they will have a bed 1-13-17. I should know in the am.              Discharge Codes     None        Expected Discharge Date and Time     Expected Discharge Date Expected Discharge Time    Jan 12, 2017 10:29 AM            Haily Sohemaker RN

## 2017-01-11 NOTE — PROGRESS NOTES
Avril Allen  [id number]  9/8/1921   LOS: 9 days   Patient Care Team:  Radha Pal MD as PCP - General (Internal Medicine)    Chief Complaint:  SOB    Subjective      Patient has a productive cough with occasional sputum production but denies chest pain, melena, abdominal pain, increased SOB. Says her right knee hurts. Eating breakfast in bed this morning. Comfortable on oxygen therapy .     Objective     Vital Sign Min/Max for last 24 hours  Temp  Min: 97.4 °F (36.3 °C)  Max: 97.8 °F (36.6 °C)   BP  Min: 90/59  Max: 135/66   Pulse  Min: 81  Max: 96   Resp  Min: 16  Max: 16   SpO2  Min: 85 %  Max: 100 %   Flow (L/min)  Min: 2  Max: 2   Weight  Min: 99 lb 11.2 oz (45.2 kg)  Max: 99 lb 11.2 oz (45.2 kg)     Last 2 weights    01/10/17  0500 01/11/17  0427   Weight: (pt family refused wants to do it later per aislinn.) 99 lb 11.2 oz (45.2 kg)       No intake or output data in the 24 hours ending 01/11/17 0745    Physical Exam:     General Appearance:    Alert, cooperative, in no acute distress   Lungs:     Mild rales to auscultation,respirations regular, even and                   unlabored    Heart:    Irregular and normal rate, normal S1 and S2, graded 3/6            murmur, no gallop, no rub, no click   Abdomen:  Extremities:   Soft, non-tender        No edema             Pulses:   Pulses palpable and equal bilaterally      Results Review:     Results from last 7 days  Lab Units 01/11/17  0511 01/10/17  2209 01/10/17  0613 01/09/17  0429   SODIUM mmol/L 140  --  138 139   POTASSIUM mmol/L 3.8 4.4 2.6* 3.8   CHLORIDE mmol/L 95*  --  91* 97*   TOTAL CO2 mmol/L 39.0*  --  38.0* 33.0*   BUN mg/dL 25*  --  22 21   CREATININE mg/dL 1.10  --  0.90 1.00   GLUCOSE mg/dL 80  --  84 93   CALCIUM mg/dL 9.7  --  9.5 9.5       Results from last 7 days  Lab Units 01/08/17  0736 01/06/17  0618 01/05/17  1133   WBC 10*3/mm3 11.40* 10.05 9.38   HEMOGLOBIN g/dL 9.3* 8.7* 8.3*   HEMATOCRIT % 29.0* 26.6* 26.1*   PLATELETS  10*3/mm3 197 187 190           NO CXR / EKG.        Medication Review: Reviewed    Assessment/Plan       Possible transfer to [facility] today for continued palliative care; depends on when they have availability. 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 Pacemaker at [facility] with no plan to change based on age, complexity of issues. Anemia improving. Patient to switch to 20mg torsemide today. Labs acceptable this morning. No I/O's to review.  Will sign off and standby; recommendations for cardiac care are outlined in previous day's note. THANKS.    Principal Problem:    Acute diastolic (congestive) heart failure  Active Problems:    Hypertension    Atrial fibrillation, no anticoags due to hx GIB and fall risk    Pacemaker at end of battery life    Pulmonary hypertension    Acute hypoxic respiratory failure    Healthcare Associated Pneumonia    Valvular heart disease    Hypokalemia    Chronic anemia    Scribed for Lucas Isbell MD by Julieta Espana, [place]. 1/11/2017  7:45 AM     I, Lucas Isbell MD, [facility], personally performed the services described in this documentation as scribed by the above named individual in my presence, and it is both accurate and complete.      01/11/17  7:45 AM

## 2017-01-12 NOTE — PROGRESS NOTES
Acute Care - Physical Therapy Treatment Note  [facility]     Patient Name: Avril Allen  : 1921  MRN: [id number]  Today's Date: 2017  Onset of Illness/Injury or Date of Surgery Date: 17  Date of Referral to PT: 17  Referring Physician: Jackelin Medley MD    Admit Date: 2017    Visit Dx:    ICD-10-CM ICD-9-CM   1. Acute diastolic (congestive) heart failure I50.31 428.31     428.0   2. Bronchospasm J98.01 519.11   3. Acute respiratory failure with hypoxia J96.01 518.81   4. Community acquired pneumonia J18.9 486   5. Impaired mobility and ADLs Z74.09 799.89   6. Impaired functional mobility, balance, gait, and endurance Z74.09 V49.89   7. Chronic atrial fibrillation I48.2 427.31   8. Essential hypertension I10 401.9     Patient Active Problem List   Diagnosis   • Hypertension   • Arthritis   • Altered mental status   • Transient cerebral ischemia   • Atrial fibrillation, no anticoags due to hx GIB and fall risk   • Pacemaker at end of battery life   • Pacemaker-dependent due to native cardiac rhythm insufficient to support life   • Failure to thrive in adult   • Acute blood loss anemia   • GI bleed   • Acute diastolic (congestive) heart failure   • Pulmonary hypertension   • Acute hypoxic respiratory failure   • Healthcare Associated Pneumonia   • Valvular heart disease   • Hypokalemia   • Chronic anemia               Adult Rehabilitation Note       17 1315 17 1540 17 1139    Rehab Assessment/Intervention    Discipline (P)  physical therapist  -SG physical therapist  -NICHOLAS,SG,NICHOLAS2 occupational therapist  -SK    Document Type (P)  therapy note (daily note)  -SG therapy note (daily note)  -DM,SG,DM2 therapy note (daily note)  -SK    Subjective Information (P)  agree to therapy;complains of;pain  -SG agree to therapy;complains of;pain  -NICHOLAS,SG,DM2 no complaints;agree to therapy  -SK    Patient Effort, Rehab Treatment (P)  adequate  -SG good  -DM,SG,DM2 good  -SK    Symptoms Noted  During/After Treatment (P)  shortness of breath;fatigue  -SG fatigue  -DM,SG,DM2 fatigue  -SK    Precautions/Limitations  fall precautions;oxygen therapy device and L/min;other (see comments)   Impaired skin integrity  -DM,SG,DM2 fall precautions;oxygen therapy device and L/min   impaired skin integrity  -SK    Equipment Issued to Patient   feeding equipment   sippy cup, improved grasp however still weak in BUE's  -SK    Recorded by [SG] Ana Jose, PT Student [DM,SG,DM2] Kiera Bennett, PT (r) Ana Jose, PT Student (t) Kiera Bennett, PT (c) [SK] DIONNE Galvez    Vital Signs    Pre Systolic BP Rehab (P)  100  -SG      Pre Treatment Diastolic BP (P)  68  -SG      Post Systolic BP Rehab  128  -DM,SG,DM2     Post Treatment Diastolic BP  92  -DM,SG,DM2     Pretreatment Heart Rate (beats/min) (P)  100  -SG 82  -DM,SG,DM2     Intratreatment Heart Rate (beats/min)  114  -DM,SG,DM2     Posttreatment Heart Rate (beats/min)  84  -DM,SG,DM2     Pre SpO2 (%) (P)  85  -SG --   O2 moniter not working  -DM,SG,DM2     O2 Delivery Pre Treatment (P)  supplemental O2   4L  -SG      O2 Delivery Intra Treatment (P)  supplemental O2   4L  -SG      Post SpO2 (%) (P)  --   No value (Reading stopped working)   -SG --   Could not get good reading despite multiple attempts  -DM,SG,DM2     O2 Delivery Post Treatment (P)  supplemental O2   4L  -SG      Pre Patient Position (P)  Sitting  -SG Supine  -DM,SG,DM2     Intra Patient Position (P)  Standing  -SG Standing  -DM,SG,DM2     Post Patient Position (P)  Supine  -SG Sitting  -DM,SG,DM2     Recorded by [SG] Ana Jose, PT Student [DM,SG,DM2] Kiera Bennett, PT (r) Ana Jose, PT Student (t) Kiera Bennett, PT (c)     Pain Assessment    Pain Assessment (P)  Martin-Muniz FACES  -SG Martin-Baker FACES  -DM,SG,DM2 No/denies pain  -SK    Martin-Muniz FACES Pain Rating (P)  2  -SG 2  -DM,SG,DM2     Post Pain Score (P)  2  -SG      Pain Type (P)  Chronic pain  -SG Acute  pain  -DM,SG,DM2     Pain Location (P)  Throat  -SG Throat  -DM,SG,DM2     Patient's Stated Pain Goal (P)  No pain  -SG No pain  -DM,SG,DM2     Pain Intervention(s) (P)  Medication (See MAR);Repositioned;MD notified (Comment)   MD came into room during treatment and looked at her throat   -SG Other (Comment)   Drink   -DM,SG,DM2     Recorded by [SG] Ana Jose, PT Student [DM,SG,DM2] Kiera Bennett, PT (r) Ana Jose, PT Student (t) Kiera Bennett, PT (c) [SK] Antonina Albarado, DIONNE    Cognitive Assessment/Intervention    Current Cognitive/Communication Assessment (P)  impaired  -SG functional  -DM,SG,DM2 functional  -SK    Orientation Status  oriented x 4  -DM,SG,DM2 oriented to;person;situation  -SK    Follows Commands/Answers Questions (P)  75% of the time;able to follow single-step instructions;needs cueing;needs increased time;needs repetition  -% of the time  -DM,SG,DM2 100% of the time;able to follow single-step instructions  -SK    Personal Safety (P)  mild impairment  -SG mild impairment  -DM,SG,DM2 mild impairment  -SK    Personal Safety Interventions (P)  elopement precautions initiated;fall prevention program maintained;gait belt;muscle strengthening facilitated;nonskid shoes/slippers when out of bed;supervised activity  - elopement precautions initiated;fall prevention program maintained;gait belt;muscle strengthening facilitated;nonskid shoes/slippers when out of bed;supervised activity  -DM,SG,DM2 fall prevention program maintained  -SK    Recorded by [SG] Ana Jose, PT Student [DM,SG,DM2] Kiera Bennett, PT (r) Ana Jose, PT Student (t) Kiera Bennett, PT (c) [SK] Antonina Albarado, ROGELIOR    Bed Mobility, Assessment/Treatment    Bed Mobility, Assistive Device (P)  draw sheet  -SG bed rails;head of bed elevated  -DM,SG,DM2     Bed Mobility, Scoot/Bridge, [place] (P)  maximum assist (25% patient effort);2 person assist required  -SG minimum assist (75% patient  effort)  -DM,SG,DM2     Bed Mob, Supine to Sit, [place]  contact guard assist  -DM,SG,DM2     Bed Mob, Sit to Supine, [place] (P)  moderate assist (50% patient effort)  -SG      Bed Mobility, Safety Issues (P)  decreased use of arms for pushing/pulling;decreased use of legs for bridging/pushing  -SG decreased use of arms for pushing/pulling;decreased use of legs for bridging/pushing  -DM,SG,DM2     Bed Mobility, Impairments (P)  strength decreased  -SG strength decreased;impaired balance  -DM,SG,DM2     Bed Mobility, Comment (P)  Pt attempt at scoot/bridge but unsucessful   -SG      Recorded by [SG] Ana Jose, PT Student [DM,SG,DM2] Kiera Bennett, PT (r) Ana Jose, PT Student (t) Kiera Bennett, PT (c)     Transfer Assessment/Treatment    Transfers, Sit-Stand [place] (P)  moderate assist (50% patient effort);2 person assist required  -SG contact guard assist;2 person assist required;verbal cues required   Sequencing and hand placement  -DM,SG,DM2     Transfers, Stand-Sit [place] (P)  maximum assist (25% patient effort);verbal cues required   Sequencing and hand placement   -SG verbal cues required   Sequencing and hand placement  -DM,SG,DM2     Transfers, Sit-Stand-Sit, Assist Device (P)  rolling walker  -SG      Transfer, Safety Issues (P)  balance decreased during turns;sequencing ability decreased;step length decreased;weight-shifting ability decreased;loses balance backward;knees buckling  -SG balance decreased during turns;step length decreased;weight-shifting ability decreased;sequencing ability decreased  -DM,SG,DM2     Transfer, Comment   pt refused  -SK    Recorded by [SG] Ana Jose, PT Student [DM,SG,DM2] Kiera Bennett, PT (r) Ana Jose, PT Student (t) Kiera Bennett, PT (c) [SK] Antonina Albarado, DIONNE    Gait Assessment/Treatment    Gait, [place] Level (P)  minimum assist (75% patient effort);verbal cues required;2 person assist required  -SG  minimum assist (75% patient effort);verbal cues required   sequencing   -DM,SG,DM2     Gait, Assistive Device (P)  rolling walker  -SG rolling walker  -DM,SG,DM2     Gait, Distance (Feet) (P)  15  -SG 15  -DM,SG,DM2     Gait, Gait Deviations (P)  ranjit decreased;knee buckling;limb motion velocity decreased;narrow base;step length decreased;weight-shifting ability decreased;forward flexed posture  -SG ranjit decreased;forward flexed posture;narrow base;step length decreased;decreased heel strike  -DM,SG,DM2     Gait, Safety Issues (P)  balance decreased during turns;sequencing ability decreased;step length decreased;weight-shifting ability decreased;loses balance backward;supplemental O2   does not step up into walker  -SG step length decreased;weight-shifting ability decreased;supplemental O2  -DM,SG,DM2     Gait, Impairments (P)  strength decreased;impaired balance;coordination impaired  -SG strength decreased;impaired balance  -DM,SG,DM2     Gait, Comment (P)  c/o walker not moving as well as tripod; walker was held steady until she stepped up into it   -SG      Recorded by [SG] Ana Jose, PT Student [DM,SG,DM2] Kiera Bennett, PT (r) Ana Jose, PT Student (t) Kiera Bennett, PT (c)     ADL Assessment/Intervention    Additional Documentation   Self-Feeding Assessment/Training (Group)  -SK    Recorded by   [SK] Antonina Albarado OTR    Self-Feeding Assessment/Training    Self-Feeding Assess/Train, Assist Device   other (see comments)  -SK    Self-Feeding Assess/Train, Position   supported sitting  -SK    Self-Feeding Assess/Train, [place]   hand over hand  -SK    Self-Feeding Assess/Train, Impairments   strength decreased;coordination impaired  -SK    Self-Feeding Assess/Train, Comment   issued sippy cup per nsg request-pt with improved grasp however d/t weakness of BUE's, pt still demo's some difficulty. FMC and grasp strength preventing self-feeding with utinsils this date  -SK    Recorded  by   [SK] DIONNE Galvez    Balance Skills Training    Sitting-Level of Assistance (P)  Close supervision  -SG Close supervision  -DM,SG,DM2     Sitting-Balance Support (P)  Right upper extremity supported;Left upper extremity supported  -SG Right upper extremity supported;Left upper extremity supported  -DM,SG,DM2     Sitting-Balance Activities (P)  Trunk control activities   changed gown   -SG Trunk control activities   LE exercises  -DM,SG,DM2     Sitting # of Minutes (P)  3  -SG 5  -DM,SG,DM2     Standing-Level of Assistance (P)  Minimum assistance;x2  -SG Close supervision  -DM,SG,DM2     Static Standing Balance Support (P)  assistive device  -SG assistive device  -DM,SG,DM2     Standing-Balance Activities (P)  Weight Shift A-P;Weight Shift R-L  -SG Weight Shift A-P;Weight Shift R-L  -DM,SG,DM2     Standing Balance # of Minutes (P)  1  -SG 1  -DM,SG,DM2     Gait Balance-Level of Assistance (P)  Minimum assistance;x2   To maintain balance and direct walker  -SG Minimum assistance  -DM,SG,DM2     Gait Balance Support (P)  assistive device  -SG assistive device  -DM,SG,DM2     Gait Balance Activities (P)  side-stepping;backwards  -SG side-stepping;backwards  -DM,SG,DM2     Gait Balance # of Minutes (P)  8  -SG 8  -DM,SG,DM2     Recorded by [SG] Ana Jose, PT Student [DM,SG,DM2] Kiera Bennett, PT (r) Ana Jose, PT Student (t) Kiera Bennett, PT (c)     Therapy Exercises    Bilateral Lower Extremities (P)  AROM:;10 reps;supine;ankle pumps/circles;hip abduction/adduction;quad sets  -SG AROM:;sitting;15 reps;LAQ;hip flexion;ankle pumps/circles;10 reps;hip abduction/adduction  -DM,SG,DM2     Recorded by [SG] Ana Jose, PT Student [DM,SG,DM2] Kiera Bennett, PT (r) Ana Jose, PT Student (t) Kiera Bennett, PT (c)     Positioning and Restraints    Pre-Treatment Position (P)  sitting in chair/recliner  -SG in bed  -DM,SG,DM2 in bed  -SK    Post Treatment Position (P)  bed  -SG chair   -DM,SG,DM2 bed  -SK    In Bed (P)  fowlers;call light within reach;encouraged to call for assist;exit alarm on  -SG  notified nsg;supine;call light within reach;encouraged to call for assist;exit alarm on  -SK    In Chair  reclined;call light within reach;encouraged to call for assist;exit alarm on;with family/caregiver  -DM,SG,DM2     Recorded by [SG] Ana Jose, PT Student [DM,SG,DM2] Kiera Bennett, PT (r) Ana Jose, PT Student (t) Kiera Bennett, PT (c) [SK] Antonina EDWAR Albarado, OTR      01/10/17 1005          Rehab Assessment/Intervention    Discipline physical therapist  -      Document Type therapy note (daily note)  -      Subjective Information no complaints;agree to therapy  -      Patient Effort, Rehab Treatment good  -      Symptoms Noted During/After Treatment fatigue  -SH      Recorded by [] Johnna Esposito, PT      Pain Assessment    Pain Assessment No/denies pain  -SH      Recorded by [] Johnna Esposito, PT      Cognitive Assessment/Intervention    Current Cognitive/Communication Assessment impaired  -      Orientation Status oriented to;person  -      Follows Commands/Answers Questions able to follow single-step instructions;100% of the time;needs cueing  -      Personal Safety mild impairment  -      Personal Safety Interventions fall prevention program maintained;gait belt;nonskid shoes/slippers when out of bed  -      Recorded by [] Johnna Esposito, PT      Bed Mobility, Assessment/Treatment    Bed Mobility, Assistive Device head of bed elevated;bed rails  -      Bed Mobility, Roll Right, [place] supervision required  -      Bed Mobility, Scoot/Bridge, [place] contact guard assist;verbal cues required  -      Bed Mob, Supine to Sit, [place] supervision required;verbal cues required  -      Bed Mobility, Safety Issues cognitive deficits limit understanding  -      Bed Mobility, Impairments strength decreased;impaired balance  -       Bed Mobility, Comment Pt anxious to get out of bed this am.  -      Recorded by [] Johnna Esposito, PT      Transfer Assessment/Treatment    Transfers, Sit-Stand [place] minimum assist (75% patient effort);2 person assist required;verbal cues required;nonverbal cues required (demo/gesture)  -      Transfers, Stand-Sit [place] minimum assist (75% patient effort);2 person assist required;verbal cues required;nonverbal cues required (demo/gesture)  -      Transfers, Sit-Stand-Sit, Assist Device rolling walker  -      Transfer, Safety Issues balance decreased during turns;sequencing ability decreased;step length decreased  -      Transfer, Impairments strength decreased;impaired balance  -      Transfer, Comment Pt performed sit to stand at edge of bed to don brief for transfer and OOB. Pt returned to sitting. Performed sit to stand a second time and transferred to chair with RW. Pt posterior lean, anxious to sit down.  -SH      Recorded by [] Johnna Esposito, PT      Gait Assessment/Treatment    Gait, [place] Level moderate assist (50% patient effort);2 person assist required;verbal cues required;nonverbal cues required (demo/gesture)  -      Gait, Assistive Device rolling walker  -      Gait, Distance (Feet) 2  -      Gait, Gait Pattern Analysis swing-to gait  -      Gait, Gait Deviations ranjit decreased;forward flexed posture  -      Gait, Safety Issues balance decreased during turns;sequencing ability decreased;step length decreased;loses balance backward;supplemental O2  -      Gait, Comment Steps to chair only.  -      Recorded by [] Johnna Esposito, PT      Balance Skills Training    Sitting-Level of Assistance Contact guard  -      Sitting-Balance Support Feet supported;Right upper extremity supported;Left upper extremity supported  -      Sitting-Balance Activities --   Maintain midline  -      Sitting # of Minutes 5  -      Recorded by []  Johnna Esposito, PT      Therapy Exercises    Bilateral Lower Extremities AROM:;10 reps;sitting;ankle pumps/circles;LAQ;hip flexion  -SH      Bilateral Upper Extremity AROM:;10 reps;sitting;elbow flexion/extension;shoulder extension/flexion  -SH      Recorded by [] Johnna Esposito, PT      Positioning and Restraints    Pre-Treatment Position in bed  -SH      Post Treatment Position chair  -SH      In Chair reclined;notified nsg;call light within reach;encouraged to call for assist;exit alarm on;legs elevated  -SH      Recorded by [SH] Johnna Esposito, PT        User Key  (r) = Recorded By, (t) = Taken By, (c) = Cosigned By    Initials Name Effective Dates     Johnna Esposito, PT 06/19/15 -     SK Antonina Albarado, OTR 05/18/15 -     DM Kiera Bennett, PT 06/19/15 -     SG Ana Jose, PT Student 12/19/16 -                 IP PT Goals       01/12/17 1355 01/11/17 1626 01/09/17 1005    Bed Mobility PT LTG    Bed Mobility PT LTG, Date Goal Reviewed  01/11/17  -DM (r) SG (t) DM (c) 01/09/17  -MIRIAM    Bed Mobility PT LTG, Outcome (P)  goal ongoing  -SG goal ongoing  -DM (r) SG (t) DM (c) goal ongoing  -MIRIAM    Transfer Training PT LTG    Transfer Training PT  LTG, Date Goal Reviewed  01/11/17  -DM (r) SG (t) DM (c) 01/09/17  -MIRIAM    Transfer Training PT LTG, Outcome (P)  goal ongoing  -SG goal ongoing  -DM (r) SG (t) DM (c) goal ongoing  -MIRIAM    Gait Training PT LTG    Gait Training Goal PT LTG, Date Goal Reviewed  01/11/17  -DM (r) SG (t) DM (c) 01/09/17  -MIRIAM    Gait Training Goal PT LTG, Outcome (P)  goal ongoing  -SG goal ongoing  -DM (r) SG (t) DM (c) goal ongoing  -MIRIAM      01/08/17 1358 01/05/17 1357 01/04/17 1042    Bed Mobility PT LTG    Bed Mobility PT LTG, Date Established   01/03/17  -DM    Bed Mobility PT LTG, Time to Achieve   1 wk  -DM    Bed Mobility PT LTG, Activity Type   all bed mobility  -DM    Bed Mobility PT LTG, [place] Level   independent  -DM    Bed Mobility PT LTG, Outcome goal  ongoing  -UD goal ongoing  -MB goal ongoing  -DM    Transfer Training PT LTG    Transfer Training PT LTG, Date Established   01/03/17  -DM    Transfer Training PT LTG, Time to Achieve   1 wk  -DM    Transfer Training PT LTG, Activity Type   sit to stand/stand to sit;bed to chair /chair to bed;toilet  -DM    Transfer Training PT LTG, [place] Level   supervision required  -DM    Transfer Training PT LTG, Assist Device   walker, rolling  -DM    Transfer Training PT LTG, Outcome goal ongoing  -UD goal ongoing  -MB goal ongoing  -DM    Gait Training PT LTG    Gait Training Goal PT LTG, Date Established   01/03/17  -DM    Gait Training Goal PT LTG, Time to Achieve   1 wk  -DM    Gait Training Goal PT LTG, [place] Level   contact guard assist  -DM    Gait Training Goal PT LTG, Assist Device   walker, rolling  -DM    Gait Training Goal PT LTG, Distance to Achieve   30  -DM    Gait Training Goal PT LTG, Outcome goal ongoing  -UD goal ongoing  -MB goal ongoing  -DM      01/03/17 1634          Bed Mobility PT LTG    Bed Mobility PT LTG, Date Established 01/03/17  -DM      Bed Mobility PT LTG, Time to Achieve 1 wk  -DM      Bed Mobility PT LTG, Activity Type all bed mobility  -DM      Bed Mobility PT LTG, [place] Level independent  -DM      Transfer Training PT LTG    Transfer Training PT LTG, Date Established 01/03/17  -DM      Transfer Training PT LTG, Time to Achieve 1 wk  -DM      Transfer Training PT LTG, Activity Type bed to chair /chair to bed;sit to stand/stand to sit;toilet  -DM      Transfer Training PT LTG, [place] Level supervision required  -DM      Transfer Training PT LTG, Assist Device walker, rolling  -DM      Gait Training PT LTG    Gait Training Goal PT LTG, Date Established 01/03/17  -DM      Gait Training Goal PT LTG, Time to Achieve 1 wk  -DM      Gait Training Goal PT LTG, [place] Level contact guard assist  -DM      Gait Training Goal PT LTG, Assist Device walker, rolling  -DM       Gait Training Goal PT LTG, Distance to Achieve 30   w/ stable VS  -DM        User Key  (r) = Recorded By, (t) = Taken By, (c) = Cosigned By    Initials Name Provider Type    MIRIAM Lazo, PT Physical Therapist    UD Marisol Valladares, PTA Physical Therapy Assistant    NICHOLAS Bennett, PT Physical Therapist    MB Joann Mariano, PT Physical Therapist    SG Ana Jose, PT Student PT Student          Physical Therapy Education     Title: PT OT SLP Therapies (Active)     Topic: Physical Therapy (Active)     Point: Mobility training (Active)    Learning Progress Summary    Learner Readiness Method Response Comment Documented by Status   Patient Acceptance E VU,NR  SG 01/12/17 1358 Done    Acceptance E VU   01/11/17 1626 Done    Acceptance E,D VU,NR HEP, transfer safety.  01/10/17 1036 Done    Acceptance E VU  KB 01/10/17 0355 Done    Acceptance E,D NR  UD 01/08/17 1357 Active    Acceptance E,D NR fall precautions, home safety, gait mechanics, HEP MB 01/05/17 1356 Active    Eager E,D NR  DM 01/04/17 1042 Active    Eager E,D NR  DM 01/03/17 1634 Active    Eager E VU  AP 01/03/17 0056 Done   Family Acceptance E,D NR  UD 01/08/17 1357 Active    Acceptance E,D NR fall precautions, home safety, gait mechanics, HEP MB 01/05/17 1356 Active    Eager E,D NR  DM 01/03/17 1634 Active               Point: Home exercise program (Active)    Learning Progress Summary    Learner Readiness Method Response Comment Documented by Status   Patient Acceptance E VU,NR   01/12/17 1358 Done    Acceptance E VU   01/11/17 1626 Done    Acceptance E,D VU,NR HEP, transfer safety.  01/10/17 1036 Done    Acceptance E VU  KB 01/10/17 0355 Done    Acceptance E,D NR  MIRIAM 01/09/17 1035 Active    Acceptance E,D NR  UD 01/08/17 1357 Active    Acceptance E,D NR fall precautions, home safety, gait mechanics, HEP MB 01/05/17 1356 Active    Eager E,D NR  DM 01/04/17 1042 Active    Eager E,D NR  DM 01/03/17 1634 Active    Eager  E VU  AP 01/03/17 0056 Done   Family Acceptance E,D NR  UD 01/08/17 1357 Active    Acceptance E,D NR fall precautions, home safety, gait mechanics, HEP MB 01/05/17 1356 Active    Eager E,D NR  DM 01/03/17 1634 Active               Point: Body mechanics (Active)    Learning Progress Summary    Learner Readiness Method Response Comment Documented by Status   Patient Acceptance E VU,NR   01/12/17 1358 Done    Acceptance E VU   01/11/17 1626 Done    Acceptance E,D VU,NR HEP, transfer safety.  01/10/17 1036 Done    Acceptance E VU  KB 01/10/17 0355 Done    Acceptance E,D NR  UD 01/08/17 1357 Active    Acceptance E,D NR fall precautions, home safety, gait mechanics, HEP MB 01/05/17 1356 Active    Eager E,D NR  DM 01/04/17 1042 Active    Eager E,D NR  DM 01/03/17 1634 Active    Eager E VU  AP 01/03/17 0056 Done   Family Acceptance E,D NR  UD 01/08/17 1357 Active    Acceptance E,D NR fall precautions, home safety, gait mechanics, HEP MB 01/05/17 1356 Active    Eager E,D NR  DM 01/03/17 1634 Active               Point: Precautions (Active)    Learning Progress Summary    Learner Readiness Method Response Comment Documented by Status   Patient Acceptance E VU,NR   01/12/17 1358 Done    Acceptance E VU   01/11/17 1626 Done    Acceptance E,D VU,NR HEP, transfer safety.  01/10/17 1036 Done    Acceptance E VU  KB 01/10/17 0355 Done    Acceptance E,D NR  UD 01/08/17 1357 Active    Acceptance E,D NR fall precautions, home safety, gait mechanics, HEP MB 01/05/17 1356 Active    Eager E,D NR  DM 01/04/17 1042 Active    Eager E,D NR  DM 01/03/17 1634 Active    Eager E VU  AP 01/03/17 0056 Done   Family Acceptance E,D NR  UD 01/08/17 1357 Active    Acceptance E,D NR fall precautions, home safety, gait mechanics, HEP MB 01/05/17 1356 Active    Eager E,D NR  DM 01/03/17 1634 Active                      User Key     Initials Effective Dates Name Provider Type Discipline    MIRIAM 06/19/15 -  Amaris Lazo, PT Physical  Therapist PT    UD 06/22/15 -  Marisol Valladares, PTA Physical Therapy Assistant PT    SH 06/19/15 -  Johnna Esposito, PT Physical Therapist PT    DM 06/19/15 -  Kiera Bennett, PT Physical Therapist PT    MB 03/14/16 -  Joann Mariano, PT Physical Therapist PT    AP 06/16/16 -  Robina Kimball, RN Registered Nurse Nurse    KB 06/16/16 -  Briana Dietz, RN Registered Nurse Nurse    SG 12/19/16 -  Ana Jose, PT Student PT Student PT                    PT Recommendation and Plan  Anticipated Discharge Disposition: skilled nursing facility  PT Frequency: daily  Plan of Care Review  Plan Of Care Reviewed With: (P) patient  Progress: (P) progress toward functional goals is gradual  Outcome Summary/Follow up Plan: (P) Patient was able to maintain the same distance of ambulation as yesterday but required more cueing and assistance.  She will continue to benefit from skilled PT in order to optimize strengtheining and progress towards her PLOF.            Outcome Measures       01/12/17 1300 01/11/17 1600 01/11/17 1139    How much help from another person do you currently need...    Turning from your back to your side while in flat bed without using bedrails? (P)  3  -SG 3  -DM (r) SG (t) DM (c)     Moving from lying on back to sitting on the side of a flat bed without bedrails? (P)  2  -SG 3  -DM (r) SG (t) DM (c)     Moving to and from a bed to a chair (including a wheelchair)? (P)  2  -SG 3  -DM (r) SG (t) DM (c)     Standing up from a chair using your arms (e.g., wheelchair, bedside chair)? (P)  2  -SG 3  -DM (r) SG (t) DM (c)     Climbing 3-5 steps with a railing? (P)  1  -SG 1  -DM (r) SG (t) DM (c)     To walk in hospital room? (P)  2  -SG 3  -DM (r) SG (t) DM (c)     AM-PAC 6 Clicks Score (P)  12  -SG 16  -DM (r) SG (t)     How much help from another is currently needed...    Putting on and taking off regular lower body clothing?   2  -SK    Bathing (including washing, rinsing, and drying)   2  -SK     Toileting (which includes using toilet bed pan or urinal)   1  -SK    Putting on and taking off regular upper body clothing   2  -SK    Taking care of personal grooming (such as brushing teeth)   2  -SK    Eating meals   2  -SK    Score   11  -SK    Functional Assessment    Outcome Measure Options  AM-[facility] 6 Clicks Basic Mobility (PT)  -DM (r) SG (t) DM (c)       01/10/17 1005          How much help from another person do you currently need...    Turning from your back to your side while in flat bed without using bedrails? 3  -SH      Moving from lying on back to sitting on the side of a flat bed without bedrails? 3  -SH      Moving to and from a bed to a chair (including a wheelchair)? 3  -SH      Standing up from a chair using your arms (e.g., wheelchair, bedside chair)? 2  -SH      Climbing 3-5 steps with a railing? 1  -SH      To walk in hospital room? 2  -SH      AM-PAC 6 Clicks Score 14  -SH      Functional Assessment    Outcome Measure Options AM-PAC 6 Clicks Basic Mobility (PT)  -        User Key  (r) = Recorded By, (t) = Taken By, (c) = Cosigned By    Initials Name Provider Type     Johnna Esposito, PT Physical Therapist    SK Antonina Albarado, OTR Occupational Therapist    NICHOLAS Bennett, PT Physical Therapist    GEORGINA Jose, PT Student PT Student           Time Calculation:         PT Charges       01/12/17 1359          Time Calculation    Start Time (P)  1515  -      PT Received On (P)  01/12/17  -      PT Goal Re-Cert Due Date (P)  01/13/17  -      Time Calculation- PT    Total Timed Code Minutes- PT (P)  25 minute(s)  -SG        User Key  (r) = Recorded By, (t) = Taken By, (c) = Cosigned By    Initials Name Provider Type    GEORGINA Jose, PT Student PT Student          Therapy Charges for Today     Code Description Service Date Service Provider Modifiers Qty    02920085203  GAIT TRAINING EA 15 MIN 1/11/2017 Ana Jose, PT Student GP 1    [id number]  PT THER PROC  EA 15 MIN 1/11/2017 Ana Jose, PT Student GP 1    65779444902 HC PT THER PROC EA 15 MIN 1/12/2017 Ana Jose, PT Student GP 2    52062341756 HC PT THER SUPP EA 15 MIN 1/12/2017 Ana Jose, PT Student GP 1          PT G-Codes  Outcome Measure Options: AM-PAC 6 Clicks Basic Mobility (PT)    Ana Jose, PT Student  1/12/2017

## 2017-01-12 NOTE — PLAN OF CARE
Problem: Fall Risk (Adult)  Goal: Absence of Falls  Outcome: Ongoing (interventions implemented as appropriate)    01/12/17 1946   Fall Risk (Adult)   Absence of Falls making progress toward outcome

## 2017-01-12 NOTE — PROGRESS NOTES
Continued Stay Note  [facility]     Patient Name: Avril Allen  MRN: [id number]  Today's Date: 1/12/2017    Admit Date: 1/2/2017          Discharge Plan       01/12/17 1018    Case Management/Social Work Plan    Plan SNF    Patient/Family In Agreement With Plan yes    Additional Comments The family requests that the pt be transfered to [facility] early in the day on 1-13-17 instead of late today. They have had a bed experience with a late transfer in the past. [facility] does have an SNF bed for 1-13-17.              Discharge Codes     None        Expected Discharge Date and Time     Expected Discharge Date Expected Discharge Time    Jan 12, 2017 10:29 AM            Haily Shoemaker RN

## 2017-01-12 NOTE — PLAN OF CARE
Problem: Patient Care Overview (Adult)  Goal: Plan of Care Review  Outcome: Ongoing (interventions implemented as appropriate)    01/12/17 1355   Coping/Psychosocial Response Interventions   Plan Of Care Reviewed With patient   Patient Care Overview   Progress progress toward functional goals is gradual   Outcome Evaluation   Outcome Summary/Follow up Plan Patient was able to maintain the same distance of ambulation as yesterday but required more cueing and assistance. She will continue to benefit from skilled PT in order to optimize strengtheining and progress towards her PLOF.          Problem: Inpatient Physical Therapy  Goal: Bed Mobility Goal LTG- PT  Outcome: Ongoing (interventions implemented as appropriate)    01/04/17 1042 01/11/17 1626 01/12/17 1355   Bed Mobility PT LTG   Bed Mobility PT LTG, Date Established 01/03/17 --  --    Bed Mobility PT LTG, Time to Achieve 1 wk --  --    Bed Mobility PT LTG, Activity Type all bed mobility --  --    Bed Mobility PT LTG, [place] Level independent --  --    Bed Mobility PT LTG, Date Goal Reviewed --  01/11/17 --    Bed Mobility PT LTG, Outcome --  --  goal ongoing       Goal: Transfer Training Goal 1 LTG- PT  Outcome: Ongoing (interventions implemented as appropriate)    01/04/17 1042 01/11/17 1626 01/12/17 1355   Transfer Training PT LTG   Transfer Training PT LTG, Date Established 01/03/17 --  --    Transfer Training PT LTG, Time to Achieve 1 wk --  --    Transfer Training PT LTG, Activity Type sit to stand/stand to sit;bed to chair /chair to bed;toilet --  --    Transfer Training PT LTG, [place] Level supervision required --  --    Transfer Training PT LTG, Assist Device walker, rolling --  --    Transfer Training PT LTG, Date Goal Reviewed --  01/11/17 --    Transfer Training PT LTG, Outcome --  --  goal ongoing       Goal: Gait Training Goal LTG- PT  Outcome: Ongoing (interventions implemented as appropriate)    01/04/17 1042 01/11/17 1626 01/12/17  1355   Gait Training PT LTG   Gait Training Goal PT LTG, Date Established 01/03/17 --  --    Gait Training Goal PT LTG, Time to Achieve 1 wk --  --    Gait Training Goal PT LTG, [place] Level contact guard assist --  --    Gait Training Goal PT LTG, Assist Device walker, rolling --  --    Gait Training Goal PT LTG, Distance to Achieve 30 --  --    Gait Training Goal PT LTG, Date Goal Reviewed --  01/11/17 --    Gait Training Goal PT LTG, Outcome --  --  goal ongoing

## 2017-01-12 NOTE — PLAN OF CARE
Problem: Patient Care Overview (Adult)  Goal: Plan of Care Review  Outcome: Ongoing (interventions implemented as appropriate)    01/12/17 05   Coping/Psychosocial Response Interventions   Plan Of Care Reviewed With patient   Patient Care Overview   Progress no change

## 2017-01-12 NOTE — PLAN OF CARE
Problem: Fall Risk (Adult)  Goal: Absence of Falls  Outcome: Ongoing (interventions implemented as appropriate)    01/12/17 1447   Fall Risk (Adult)   Absence of Falls making progress toward outcome         Problem: Cardiac: Heart Failure (Adult)  Goal: Signs and Symptoms of Listed Potential Problems Will be Absent or Manageable (Cardiac: Heart Failure)  Outcome: Ongoing (interventions implemented as appropriate)    Problem: Patient Care Overview (Adult)  Goal: Plan of Care Review  Outcome: Ongoing (interventions implemented as appropriate)  Goal: Discharge Needs Assessment  Outcome: Ongoing (interventions implemented as appropriate)    01/12/17 1447   Discharge Needs Assessment   Concerns To Be Addressed no discharge needs identified   Readmission Within The Last 30 Days no previous admission in last 30 days   Equipment Needed After Discharge walker, standard;wheelchair   Current Health   Outpatient/Agency/Support Group Needs homecare agency (specify level of care)   Anticipated Changes Related to Illness none   Self-Care   Equipment Currently Used at Home wheelchair;walker, rolling   Living Environment   Transportation Available family or friend will provide         Problem: Skin Integrity Impairment, Risk/Actual (Adult)  Goal: Skin Integrity/Wound Healing  Outcome: Ongoing (interventions implemented as appropriate)    01/12/17 1447   Skin Integrity Impairment, Risk/Actual (Adult)   Skin Integrity/Wound Healing making progress toward outcome

## 2017-01-12 NOTE — PROGRESS NOTES
"Adult Nutrition  Assessment/PES    Patient Name:  Avril Allen  YOB: 1921  MRN: [id number]  Admit Date:  1/2/2017    Assessment Date:  1/12/2017        Reason for Assessment       01/12/17 0928    Reason for Assessment    Reason For Assessment/Visit follow up protocol    Time Spent (min) 20    Diagnosis Diagnosis   Per notes this admission              Nutrition/Diet History       01/12/17 0958    Nutrition/Diet History    Reported/Observed By Patient    Mental State/Condition Confusion   Pt stated \"someone has already eaten my food\" while she was eating breakfast    Other Pt seemed confused but reported that she did not have much of an appetite and did not like the boost supp            Anthropometrics       01/12/17 1001    Anthropometrics    Height 157.5 cm (62\")    Weight 44.5 kg (98 lb 1.7 oz)    Ideal Body Weight (IBW)    Ideal Body Weight (IBW), Female 50.83    % Ideal Body Weight 87.73    Body Mass Index (BMI)    BMI (kg/m2) 17.98      01/12/17 0939    Anthropometrics    Height 157.5 cm (62\")    Weight 44.5 kg (98 lb 1.7 oz)    Ideal Body Weight (IBW)    Ideal Body Weight (IBW), Female 50.83    % Ideal Body Weight 87.73    Body Mass Index (BMI)    BMI (kg/m2) 17.98            Labs/Tests/Procedures/Meds       01/12/17 1001    Labs/Tests/Procedures/Meds    Labs/Tests Review Reviewed      01/12/17 0942    Labs/Tests/Procedures/Meds    Labs/Tests Review Reviewed                Nutrition Prescription Ordered       01/12/17 0942    Nutrition Prescription PO    Current PO Diet Soft Texture    Texture Whole foods    Supplement Boost Plus    Supplement Frequency 2 times a day    Common Modifiers Cardiac;Low Sodium            Evaluation of Received Nutrient/Fluid Intake       01/12/17 1004    PO Evaluation    Number of Meals 4    % PO Intake 19              Problem/Interventions:        Problem 1       01/12/17 1007    Nutrition Diagnoses Problem 1    Problem 1 Inadequate Intake/Infusion    Etiology " (related to) Medical Diagnosis   Clinical condition    Signs/Symptoms (evidenced by) PO Intake    Percent (%) intake recorded 19 %    Over number of meals 4                    Intervention Goal       01/12/17 1009    Intervention Goal    General Nutrition support treatment    PO Increase intake            Nutrition Intervention       01/12/17 1009    Nutrition Intervention    RD/Tech Action Encourage intake;Follow Tx progress              Education/Evaluation       01/12/17 1009    Monitor/Evaluation    Monitor Per protocol;PO intake;Supplement intake        Comments:      Electronically signed by:  Kayla Miranda  01/12/17 3:14 PM

## 2017-01-12 NOTE — PROGRESS NOTES
[facility] Medicine Services  INPATIENT PROGRESS NOTE    Date of Admission: 1/2/2017  Length of Stay: 9  Primary Care Physician: Radha Pal MD    Subjective     Chief Complaint: bilateral infiltrates  HPI:  Dtr present.  Pt. Has been complaining of sore throat and painful swallowing.  Breathing is stable.  No other new issues.    Review Of Systems:   Review of Systems   Reason unable to perform ROS: patient confusion.     Objective      Temp:  [97.8 °F (36.6 °C)-97.9 °F (36.6 °C)] 97.9 °F (36.6 °C)  Heart Rate:  [81-96] 89  Resp:  [16] 16  BP: ()/(68-82) 119/82  Physical Exam    Frail and weak, but no acute distress, currently awake and in no distress  Up in bed  Oropharynx: some thrush  irreg irrg  abd soft, non tender non distended  Some bilateral rhonchi and rales, unchanged from prvious  No LE edema  Neuro - awake, confused, follows commands, and no focal deficits.  Skin - scattered bruises    Results Review:    I have reviewed the labs, radiology results and diagnostic studies.      Results from last 7 days  Lab Units 01/08/17  0736   WBC 10*3/mm3 11.40*   HEMOGLOBIN g/dL 9.3*   PLATELETS 10*3/mm3 197       Results from last 7 days  Lab Units 01/11/17  0511   SODIUM mmol/L 140   POTASSIUM mmol/L 3.8   TOTAL CO2 mmol/L 39.0*   CREATININE mg/dL 1.10   GLUCOSE mg/dL 80       Culture Data:   BLOOD CULTURE   Date Value Ref Range Status   01/02/2017 No growth at 2 days  Preliminary   01/02/2017 No growth at 2 days  Preliminary     Radiology Data:     I have reviewed the medications.    aspirin 325 mg Oral Daily   brimonidine 1 drop Both Eyes BID   carvedilol 6.25 mg Oral BID With Meals   cetirizine 10 mg Oral Daily   DULoxetine 30 mg Oral Daily   FLUoxetine 40 mg Oral Daily   fluticasone 2 spray Nasal Daily   gabapentin 100 mg Oral Nightly   guaifenesin-dextromethorphan 1 tablet Oral BID   heparin (porcine) 5,000 Units Subcutaneous Q12H   lidocaine 1 patch Transdermal Q24H  "  nystatin  Topical Q12H   pantoprazole 40 mg Oral Q AM   pharmacy consult - MTM  Does not apply Daily   potassium chloride 20 mEq Oral BID With Meals   torsemide 20 mg Oral Daily   valsartan 40 mg Oral Q12H     CXR (1/8 my read) - worsening edema/effusions    Assessment/Plan     Assessment/Problem List  Principal Problem:    Acute diastolic (congestive) heart failure  Active Problems:    Atrial fibrillation, no anticoags due to hx GIB and fall risk    Hypertension    Pacemaker at end of battery life    Pulmonary hypertension    Acute hypoxic respiratory failure    Healthcare Associated Pneumonia    Valvular heart disease    Hypokalemia    Chronic anemia    Plan  Acute on Chronic Resp failure  - multifactorial, but seems most c/w volume overload  - seems improved to baseline.     - B/L infitrates on cxr - more consistent with CHF based on improvement with diuresis.  Couldn't exclude PNA, s/p 7 days of empiric abx    A/C diastolic CHF  --I think have diuresed as much as reasonable.  Will change IV bumex to 20mg demadex dailly.  - replace K+, and may need scheduled replacement while on demadx    Anemia    - related to recent GI bleed last year, stable     PPM at end of battery life - no plans for change    Valvular Heart Dz  - not a surgical candidate    Dispo:  I discussed with dtr at bedside this past weekend.. Goal is going to be to get \"tuned up\" as much as possible, but she understands that patient will likely continue to decline given age and current condition.    Plan will be for palliative care to follow at SNF and perhaps focusing on comfort and preventing readmissions if possible.      Reviewed CM note, possible bed at [facility] tomorrow. Should be medically ready.    Start some magic mouthwash for thrush    DVT prophylaxis: mechanical    High complexity.    Victor Manuel Russ MD   01/11/17   7:42 PM    Please note that portions of this note may have been completed with a voice recognition program. Efforts " were made to edit the dictations, but occasionally words are mistranscribed.

## 2017-01-12 NOTE — PROGRESS NOTES
Continued Stay Note  [facility]     Patient Name: Avril Allen  MRN: [id number]  Today's Date: 1/12/2017    Admit Date: 1/2/2017          Discharge Plan       01/12/17 1540    Case Management/Social Work Plan    Plan SNF    Patient/Family In Agreement With Plan yes    Additional Comments Plan [facility] on 1-13-17 between 11-12. Will need an 02 tank.              Discharge Codes     None        Expected Discharge Date and Time     Expected Discharge Date Expected Discharge Time    Jan 12, 2017 10:29 AM            Haily Shoemaker RN

## 2017-01-12 NOTE — PROGRESS NOTES
[facility] Medicine Services  INPATIENT PROGRESS NOTE    Date of Admission: 1/2/2017  Length of Stay: 10  Primary Care Physician: Radha Pal MD    Subjective     Chief Complaint: bilateral infiltrates  HPI:  No family present during encounter. Breathing is stable. No chest pain. No abdominal pain. Pleasantly confused. No other new issues. Sitting up in chair, working with pt    Review Of Systems:   Review of Systems   Reason unable to perform ROS: patient confusion.     Objective      Temp:  [97.9 °F (36.6 °C)-98.2 °F (36.8 °C)] 98.2 °F (36.8 °C)  Heart Rate:  [89-99] 99  Resp:  [16-18] 18  BP: ()/(65-86) 130/86     Physical Exam   Constitutional: Thin, elderly female in no acute distress. Awake. Pleasantly confused.  Oriented to self only.   Oropharynx: thrush on uvula and posterior pharynx   Cardiovascular: irregularly irregular, (+) JVD, no LE edema  Pulmonary: Bilateral rhonchi and rales, unchanged  Abdomen: Soft, non-tender, non-distended. Normal active bowel sounds.   Neuro - awake, confused, follows commands. No focal deficits.  Skin - scattered bruises. Ulceration to LLE and intact blister to LLE. Dressings are clean, dry and intact    Results Review:    I have reviewed the labs, radiology results and diagnostic studies.      Results from last 7 days  Lab Units 01/12/17  1207 01/08/17  0736 01/06/17  0618   WBC 10*3/mm3 9.28 11.40* 10.05   HEMOGLOBIN g/dL 9.4* 9.3* 8.7*   HEMATOCRIT % 29.4* 29.0* 26.6*   PLATELETS 10*3/mm3 233 197 187       Results from last 7 days  Lab Units 01/12/17  1207 01/11/17  0511 01/10/17  2209 01/10/17  0613   SODIUM mmol/L 141 140  --  138   POTASSIUM mmol/L 4.2 3.8 4.4 2.6*   CHLORIDE mmol/L 93* 95*  --  91*   TOTAL CO2 mmol/L 42.0* 39.0*  --  38.0*   BUN mg/dL 29* 25*  --  22   CREATININE mg/dL 1.10 1.10  --  0.90   GLUCOSE mg/dL 108* 80  --  84   CALCIUM mg/dL 10.3 9.7  --  9.5     Culture Data:   BLOOD CULTURE   Date Value Ref Range  Status   01/02/2017 No growth at 2 days  Preliminary   01/02/2017 No growth at 2 days  Preliminary     Radiology Data:     I have reviewed the medications.  1/8 CXR - worsening edema/effusions  1/12- CXR pending     Assessment/Plan     Assessment/Problem List  Principal Problem:    Acute diastolic (congestive) heart failure  Active Problems:    Hypertension    Atrial fibrillation, no anticoags due to hx GIB and fall risk    Pacemaker at end of battery life    Pulmonary hypertension    Acute hypoxic respiratory failure    Healthcare Associated Pneumonia    Valvular heart disease    Hypokalemia    Chronic anemia    - A/C respiratory failure, likely multifactoral but seems most consistent with volume overload Seems improved to baseline with diuresis    - B/L infitrates on cxr - more consistent with CHF based on improvement with diuresis.  Couldn't exclude PNA, s/p 7 days of empiric abx  - Will check AM CXR and BMP    A/C diastolic CHF  -- Demadex 20mg daily, will likely need scheduled K+ replacement while on demadex -    Thrush   - Magic mouthwash 4x/day     Anemia    - related to recent GI bleed last year, stable     PPM at end of battery life - no plans for change    Valvular Heart Dz  - not a surgical candidate    Dispo:  Patient is medically ready for discharge. Family requested that patient be transferred in the AM instead of tonight. Plan is for palliative care to follow Ms. Allen while at SNF and perhaps focus more on comfort and prevention of readmissions. Anticipate transfer tomorrow AM.    DVT prophylaxis: mechanical    High complexity.    VITALY Lindsay   01/12/17   2:39 PM    Please note that portions of this note may have been completed with a voice recognition program. Efforts were made to edit the dictations, but occasionally words are mistranscribed.  I have seen and examined patient, performing a face-to-face diagnostic evaluation with plan of care reviewed and developed with APRN/PA    . I  have addended and modified the above history of present illness, physical examination, and assessment and plan to reflect my findings and impressions.     Fabi Mccoy,   01/12/17  3:23 PM

## 2017-01-13 NOTE — THERAPY DISCHARGE NOTE
Acute Care - Physical Therapy Treatment Note/Discharge   [place]     Patient Name: Avril Allen  : 1921  MRN: [id number]  Today's Date: 2017  Onset of Illness/Injury or Date of Surgery Date: 17  Date of Referral to PT: 17  Referring Physician: Jackelin Medley MD    Admit Date: 2017    Visit Dx:    ICD-10-CM ICD-9-CM   1. Acute diastolic (congestive) heart failure I50.31 428.31     428.0   2. Bronchospasm J98.01 519.11   3. Acute respiratory failure with hypoxia J96.01 518.81   4. Community acquired pneumonia J18.9 486   5. Impaired mobility and ADLs Z74.09 799.89   6. Impaired functional mobility, balance, gait, and endurance Z74.09 V49.89   7. Chronic atrial fibrillation I48.2 427.31   8. Essential hypertension I10 401.9     Patient Active Problem List   Diagnosis   • Hypertension   • Arthritis   • Altered mental status   • Transient cerebral ischemia   • Atrial fibrillation, no anticoags due to hx GIB and fall risk   • Pacemaker at end of battery life   • Pacemaker-dependent due to native cardiac rhythm insufficient to support life   • Failure to thrive in adult   • Acute blood loss anemia   • GI bleed   • Acute diastolic (congestive) heart failure   • Pulmonary hypertension   • Acute hypoxic respiratory failure   • Healthcare Associated Pneumonia   • Valvular heart disease   • Hypokalemia   • Chronic anemia       Physical Therapy Education     Title: PT OT SLP Therapies (Active)     Topic: Physical Therapy (Active)     Point: Mobility training (Active)    Learning Progress Summary    Learner Readiness Method Response Comment Documented by Status   Patient Eager E,D,H NR  DM 17 0950 Active    Acceptance E VU,NR  SG 17 1358 Done    Acceptance E VU  SG 17 1626 Done    Acceptance E,D VU,NR HEP, transfer safety. SH 01/10/17 1036 Done    Acceptance E VU  KB 01/10/17 0355 Done    Acceptance E,D NR  UD 17 1357 Active    Acceptance E,D NR fall precautions, home safety,  gait mechanics, HEP MB 01/05/17 1356 Active    Eager E,D NR  DM 01/04/17 1042 Active    Eager E,D NR  DM 01/03/17 1634 Active    Eager E VU  AP 01/03/17 0056 Done   Family Acceptance E,D NR  UD 01/08/17 1357 Active    Acceptance E,D NR fall precautions, home safety, gait mechanics, HEP MB 01/05/17 1356 Active    Eager E,D NR  DM 01/03/17 1634 Active               Point: Home exercise program (Active)    Learning Progress Summary    Learner Readiness Method Response Comment Documented by Status   Patient Eager E,D,H NR  DM 01/13/17 0950 Active    Acceptance E VU,NR  SG 01/12/17 1358 Done    Acceptance E VU  SG 01/11/17 1626 Done    Acceptance E,D VU,NR HEP, transfer safety.  01/10/17 1036 Done    Acceptance E VU  KB 01/10/17 0355 Done    Acceptance E,D NR  MIRIAM 01/09/17 1035 Active    Acceptance E,D NR  UD 01/08/17 1357 Active    Acceptance E,D NR fall precautions, home safety, gait mechanics, HEP MB 01/05/17 1356 Active    Eager E,D NR   01/04/17 1042 Active    Eager E,D NR   01/03/17 1634 Active    Eager E VU  AP 01/03/17 0056 Done   Family Acceptance E,D NR  UD 01/08/17 1357 Active    Acceptance E,D NR fall precautions, home safety, gait mechanics, HEP MB 01/05/17 1356 Active    Eager E,D NR   01/03/17 1634 Active               Point: Body mechanics (Active)    Learning Progress Summary    Learner Readiness Method Response Comment Documented by Status   Patient Eager E,D,H NR   01/13/17 0950 Active    Acceptance E VU,NR  SG 01/12/17 1358 Done    Acceptance E VU  SG 01/11/17 1626 Done    Acceptance E,D VU,NR HEP, transfer safety.  01/10/17 1036 Done    Acceptance E VU  KB 01/10/17 0355 Done    Acceptance E,D NR  UD 01/08/17 1357 Active    Acceptance E,D NR fall precautions, home safety, gait mechanics, HEP MB 01/05/17 1356 Active    Eager E,D NR  DM 01/04/17 1042 Active    Eager E,D NR  DM 01/03/17 1634 Active    Emmanuel LAZCANO  AP 01/03/17 0056 Done   Family Acceptance BERTHA ROBLERO NR  UD 01/08/17 1357 Active     Acceptance E,D NR fall precautions, home safety, gait mechanics, HEP MB 01/05/17 1356 Active    Eager E,D NR   01/03/17 1634 Active               Point: Precautions (Active)    Learning Progress Summary    Learner Readiness Method Response Comment Documented by Status   Patient Eager E,D,H NR  DM 01/13/17 0950 Active    Acceptance E VU,NR  SG 01/12/17 1358 Done    Acceptance E VU  SG 01/11/17 1626 Done    Acceptance E,D VU,NR HEP, transfer safety.  01/10/17 1036 Done    Acceptance E VU   01/10/17 0355 Done    Acceptance E,D NR   01/08/17 1357 Active    Acceptance E,D NR fall precautions, home safety, gait mechanics, HEP MB 01/05/17 1356 Active    Eager E,D NR   01/04/17 1042 Active    Eager E,D NR  DM 01/03/17 1634 Active    Eager E VU  AP 01/03/17 0056 Done   Family Acceptance E,D NR  UD 01/08/17 1357 Active    Acceptance E,D NR fall precautions, home safety, gait mechanics, HEP MB 01/05/17 1356 Active    Eager E,D NR   01/03/17 1634 Active                      User Key     Initials Effective Dates Name Provider Type Discipline     06/19/15 -  Amaris Lazo, PT Physical Therapist PT     06/22/15 -  Marisol Valladares, PTA Physical Therapy Assistant PT     06/19/15 -  Johnna Esposito, PT Physical Therapist PT     06/19/15 -  Kiera Bennett, PT Physical Therapist PT    MB 03/14/16 -  Joann Mariano, PT Physical Therapist PT    AP 06/16/16 -  Robina Kimball, RN Registered Nurse Nurse     06/16/16 -  Briana Dietz, RN Registered Nurse Nurse     12/19/16 -  Ana Jose, PT Student PT Student PT                    IP PT Goals       01/13/17 0950 01/12/17 1355 01/11/17 1626    Bed Mobility PT LTG    Bed Mobility PT LTG, Date Established 01/03/17  -DM      Bed Mobility PT LTG, Time to Achieve 1 wk  -DM      Bed Mobility PT LTG, Activity Type all bed mobility  -DM      Bed Mobility PT LTG, [place] Level independent  -DM      Bed Mobility PT LTG, Date Goal Reviewed    01/11/17  -DM (r) SG (t) DM (c)    Bed Mobility PT LTG, Outcome goal not met  -DM goal ongoing  -DM (r) SG (t) DM (c) goal ongoing  -DM (r) SG (t) DM (c)    Bed Mobility PT LTG, Reason Goal Not Met discharged from facility  -DM      Transfer Training PT LTG    Transfer Training PT LTG, Date Established 01/03/17  -DM      Transfer Training PT LTG, Time to Achieve 1 wk  -DM      Transfer Training PT LTG, Activity Type sit to stand/stand to sit;bed to chair /chair to bed;toilet  -DM      Transfer Training PT LTG, [place] Level supervision required  -DM      Transfer Training PT LTG, Assist Device walker, rolling  -DM      Transfer Training PT  LTG, Date Goal Reviewed   01/11/17  -DM (r) SG (t) DM (c)    Transfer Training PT LTG, Outcome goal not met  -DM goal ongoing  -DM (r) SG (t) DM (c) goal ongoing  -DM (r) SG (t) DM (c)    Transfer Training PT LTG, Reason Goal Not Met discharged from facility  -DM      Gait Training PT LTG    Gait Training Goal PT LTG, Date Established 01/03/17  -DM      Gait Training Goal PT LTG, Time to Achieve 1 wk  -DM      Gait Training Goal PT LTG, [place] Level contact guard assist  -DM      Gait Training Goal PT LTG, Assist Device walker, rolling  -DM      Gait Training Goal PT LTG, Distance to Achieve 30  -DM      Gait Training Goal PT LTG, Date Goal Reviewed   01/11/17  -DM (r) SG (t) DM (c)    Gait Training Goal PT LTG, Outcome goal not met  -DM goal ongoing  -DM (r) SG (t) DM (c) goal ongoing  -DM (r) SG (t) DM (c)    Gait Training Goal PT LTG, Reason Goal Not Met discharged from facility  -DM        01/09/17 1005 01/08/17 1358 01/05/17 1357    Bed Mobility PT LTG    Bed Mobility PT LTG, Date Goal Reviewed 01/09/17  -MIRIAM      Bed Mobility PT LTG, Outcome goal ongoing  -MIRIAM goal ongoing  -UD goal ongoing  -MB    Transfer Training PT LTG    Transfer Training PT  LTG, Date Goal Reviewed 01/09/17  -MIRIAM      Transfer Training PT LTG, Outcome goal ongoing  -MIRIAM goal ongoing  -UD goal  ongoing  -MB    Gait Training PT LTG    Gait Training Goal PT LTG, Date Goal Reviewed 01/09/17  -MIRIAM      Gait Training Goal PT LTG, Outcome goal ongoing  -MIRIAM goal ongoing  -UD goal ongoing  -MB      01/04/17 1042 01/03/17 1634       Bed Mobility PT LTG    Bed Mobility PT LTG, Date Established 01/03/17  -DM 01/03/17  -DM     Bed Mobility PT LTG, Time to Achieve 1 wk  -DM 1 wk  -DM     Bed Mobility PT LTG, Activity Type all bed mobility  -DM all bed mobility  -DM     Bed Mobility PT LTG, [place] Level independent  -DM independent  -DM     Bed Mobility PT LTG, Outcome goal ongoing  -DM      Transfer Training PT LTG    Transfer Training PT LTG, Date Established 01/03/17  -DM 01/03/17  -DM     Transfer Training PT LTG, Time to Achieve 1 wk  -DM 1 wk  -DM     Transfer Training PT LTG, Activity Type sit to stand/stand to sit;bed to chair /chair to bed;toilet  -DM bed to chair /chair to bed;sit to stand/stand to sit;toilet  -DM     Transfer Training PT LTG, [place] Level supervision required  -DM supervision required  -DM     Transfer Training PT LTG, Assist Device walker, rolling  -DM walker, rolling  -DM     Transfer Training PT LTG, Outcome goal ongoing  -DM      Gait Training PT LTG    Gait Training Goal PT LTG, Date Established 01/03/17  -DM 01/03/17  -DM     Gait Training Goal PT LTG, Time to Achieve 1 wk  -DM 1 wk  -DM     Gait Training Goal PT LTG, [place] Level contact guard assist  -DM contact guard assist  -DM     Gait Training Goal PT LTG, Assist Device walker, rolling  -DM walker, rolling  -DM     Gait Training Goal PT LTG, Distance to Achieve 30  -DM 30   w/ stable VS  -DM     Gait Training Goal PT LTG, Outcome goal ongoing  -DM        User Key  (r) = Recorded By, (t) = Taken By, (c) = Cosigned By    Initials Name Provider Type    MIRIAM Lazo, PT Physical Therapist    MIRIAM Valladares, PTA Physical Therapy Assistant    NICHOLAS Bennett, PT Physical Therapist    GEORGES PEREZ  "[place], PT Physical Therapist    GEORGINA Jose, PT Student PT Student              Adult Rehabilitation Note       01/13/17 0855 01/12/17 1315 01/11/17 1540    Rehab Assessment/Intervention    Discipline physical therapist  -DM physical therapist  -DM,SG,DM2 physical therapist  -DM,SG,DM2    Document Type therapy note (daily note);discharge summary  -DM therapy note (daily note)  -DM,SG,DM2 therapy note (daily note)  -DM,SG,DM2    Subjective Information agree to therapy;complains of;pain;weakness   incr.conf perPCT;\"I'm going to [place]\"(will d/c to [facility]  -DM agree to therapy;complains of;pain  -DM,SG,DM2 agree to therapy;complains of;pain  -DM,SG,DM2    Patient Effort, Rehab Treatment fair  -DM adequate  -DM,SG,DM2 good  -DM,SG,DM2    Symptoms Noted During/After Treatment shortness of breath;fatigue;other (see comments)   incr.jerkiing mvmts;nsg gave meds;dozed off  -DM shortness of breath;fatigue  -DM,SG,DM2 fatigue  -DM,SG,DM2    Precautions/Limitations fall precautions;other (see comments);oxygen therapy device and L/min   exit alarm;dem.  -DM  fall precautions;oxygen therapy device and L/min;other (see comments)   Impaired skin integrity  -DM,SG,DM2    Recorded by [DM] Kiera Bennett, PT [DM,SG,DM2] Kiera Bennett, PT (r) Ana Jose, PT Student (t) Kiera Bennett, PT (c) [DM,SG,DM2] Kiera Bennett, PT (r) Ana Jose, PT Student (t) Kiera Bennett, PT (c)    Vital Signs    Pre Systolic BP Rehab 115  -  -DM,SG,DM2     Pre Treatment Diastolic BP 88  -DM 68  -DM,SG,DM2     Post Systolic BP Rehab 147  -DM  128  -DM,SG,DM2    Post Treatment Diastolic BP 95  -DM  92  -DM,SG,DM2    Pretreatment Heart Rate (beats/min) 102   irreg.  -  -DM,SG,DM2 82  -DM,SG,DM2    Intratreatment Heart Rate (beats/min) 103  -DM  114  -DM,SG,DM2    Posttreatment Heart Rate (beats/min) 95  -DM  84  -DM,SG,DM2    Pre SpO2 (%) 95  -DM 85  -DM,SG,DM2 --   O2 moniter not working  -DM,SG,DM2    O2 Delivery " Pre Treatment supplemental O2   2 L  -DM supplemental O2   4L  -DM,SG,DM2     Intra SpO2 (%) 86  -DM      O2 Delivery Intra Treatment supplemental O2  -DM supplemental O2   4L  -DM,SG,DM2     Post SpO2 (%) 94  -DM --   No value (Reading stopped working)   -DM,SG,DM2 --   Could not get good reading despite multiple attempts  -DM,SG,DM2    O2 Delivery Post Treatment supplemental O2  -DM supplemental O2   4L  -DM,SG,DM2     Pre Patient Position Sitting  -DM Sitting  -DM,SG,DM2 Supine  -DM,SG,DM2    Intra Patient Position Standing  -DM Standing  -DM,SG,DM2 Standing  -DM,SG,DM2    Post Patient Position Sitting  -DM Supine  -DM,SG,DM2 Sitting  -DM,SG,DM2    Recorded by [DM] Kiera Bennett, PT [DM,SG,DM2] Kiera Bennett, PT (r) Ana Jose, PT Student (t) Kiera Bennett, PT (c) [DM,SG,DM2] Kiera Bennett, PT (r) Ana Jose, PT Student (t) Kiera Bennett, PT (c)    Pain Assessment    Pain Assessment Martin-Muniz FACES  -DM Martin-Baker FACES  -DM,SG,DM2 Martin-Muniz FACES  -DM,SG,DM2    Martin-Muniz FACES Pain Rating 2  -DM 2  -DM,SG,DM2 2  -DM,SG,DM2    Post Pain Score  2  -DM,SG,DM2     Pain Type Chronic pain  -DM Chronic pain  -DM,SG,DM2 Acute pain  -DM,SG,DM2    Pain Location Throat  -DM Throat  -DM,SG,DM2 Throat  -DM,SG,DM2    Pain Descriptors Aching  -DM      Pain Frequency Constant/continuous  -DM      Patient's Stated Pain Goal No pain  -DM No pain  -DM,SG,DM2 No pain  -DM,SG,DM2    Pain Intervention(s) Medication (See MAR);Repositioned  -DM Medication (See MAR);Repositioned;MD notified (Comment)   MD came into room during treatment and looked at her throat   -DM,SG,DM2 Other (Comment)   Drink   -DM,SG,DM2    Recorded by [DM] Kiera Bennett, PT [DM,SG,DM2] Kiera Bennett, PT (r) Ana Jose, PT Student (t) Kiera Bennett, PT (c) [DM,SG,DM2] Kiera Bennett, PT (r) Ana Jose, PT Student (t) Kiera Bennett, PT (c)    Vision Assessment/Intervention    Visual Impairment WFL with corrective lenses   -DM      Recorded by [DM] Kiera Bennett, PT      Cognitive Assessment/Intervention    Current Cognitive/Communication Assessment impaired  -DM impaired  -DM,SG,DM2 functional  -DM,SG,DM2    Orientation Status oriented to;person;place  -DM  oriented x 4  -DM,SG,DM2    Follows Commands/Answers Questions 75% of the time;able to follow single-step instructions;needs cueing;needs increased time;needs repetition  -DM 75% of the time;able to follow single-step instructions;needs cueing;needs increased time;needs repetition  -DM,SG,DM2 100% of the time  -DM,SG,DM2    Personal Safety mild impairment  -DM mild impairment  -DM,SG,DM2 mild impairment  -DM,SG,DM2    Personal Safety Interventions elopement precautions initiated;fall prevention program maintained;gait belt;nonskid shoes/slippers when out of bed  -DM elopement precautions initiated;fall prevention program maintained;gait belt;muscle strengthening facilitated;nonskid shoes/slippers when out of bed;supervised activity  -DM,SG,DM2 elopement precautions initiated;fall prevention program maintained;gait belt;muscle strengthening facilitated;nonskid shoes/slippers when out of bed;supervised activity  -DM,SG,DM2    Recorded by [DM] Kiera Bennett, PT [DM,SG,DM2] Kiera Bennett, PT (r) Ana Jose, PT Student (t) Kiera Bennett, PT (c) [DM,SG,DM2] Kiera Bennett, PT (r) Ana Jose, PT Student (t) Kiera Bennett, PT (c)    Bed Mobility, Assessment/Treatment    Bed Mobility, Assistive Device  draw sheet  -DM,SG,DM2 bed rails;head of bed elevated  -DM,SG,DM2    Bed Mobility, Scoot/Bridge, [place]  maximum assist (25% patient effort);2 person assist required  -DM,SG,DM2 minimum assist (75% patient effort)  -DM,SG,DM2    Bed Mob, Supine to Sit, [place]   contact guard assist  -DM,SG,DM2    Bed Mob, Sit to Supine, [place]  moderate assist (50% patient effort)  -DM,SG,DM2     Bed Mobility, Safety Issues  decreased use of arms for  pushing/pulling;decreased use of legs for bridging/pushing  -DM,SG,DM2 decreased use of arms for pushing/pulling;decreased use of legs for bridging/pushing  -DM,SG,DM2    Bed Mobility, Impairments  strength decreased  -DM,SG,DM2 strength decreased;impaired balance  -DM,SG,DM2    Bed Mobility, Comment UIC  -DM Pt attempt at scoot/bridge but unsucessful   -DM,SG,DM2     Recorded by [DM] Kiera Bennett, PT [DM,SG,DM2] Kiera Bennett, PT (r) Ana Jose, PT Student (t) Kiera Bennett, PT (c) [DM,SG,DM2] Kiera Bennett, PT (r) Ana Jose, PT Student (t) Kiera Bennett, PT (c)    Transfer Assessment/Treatment    Transfers, Sit-Stand [place] maximum assist (25% patient effort)  -DM moderate assist (50% patient effort);2 person assist required  -DM,SG,DM2 contact guard assist;2 person assist required;verbal cues required   Sequencing and hand placement  -DM,SG,DM2    Transfers, Stand-Sit [place] moderate assist (50% patient effort);verbal cues required  -DM maximum assist (25% patient effort);verbal cues required   Sequencing and hand placement   -DM,SG,DM2 verbal cues required   Sequencing and hand placement  -DM,SG,DM2    Transfers, Sit-Stand-Sit, Assist Device rolling walker  -DM rolling walker  -DM,SG,DM2     Transfer, Safety Issues weight-shifting ability decreased;knees buckling  -DM balance decreased during turns;sequencing ability decreased;step length decreased;weight-shifting ability decreased;loses balance backward;knees buckling  -DM,SG,DM2 balance decreased during turns;step length decreased;weight-shifting ability decreased;sequencing ability decreased  -DM,SG,DM2    Transfer, Impairments strength decreased;impaired balance;motor control impaired  -DM      Recorded by [DM] Kiera Bennett, PT [DM,SG,DM2] Kiera Bennett, PT (r) Ana Jose, PT Student (t) Kiera Bennett, PT (c) [DM,SG,DM2] Kiera Bennett, PT (r) Ana Jose, PT Student (t) Kiera M Sabrina, PT (c)    Gait  "Assessment/Treatment    Gait, [place] Level maximum assist (25% patient effort);verbal cues required  -DM minimum assist (75% patient effort);verbal cues required;2 person assist required  -DM,SG,DM2 minimum assist (75% patient effort);verbal cues required   sequencing   -DM,SG,DM2    Gait, Assistive Device rolling walker  -DM rolling walker  -DM,SG,DM2 rolling walker  -DM,SG,DM2    Gait, Distance (Feet) 6   SIDE STEPPING; 1 MIN REST midway  -DM 15  -DM,SG,DM2 15  -DM,SG,DM2    Gait, Gait Deviations ataxia;ranjit decreased;decreased heel strike;forward flexed posture;knee buckling;narrow base;step length decreased;toe-to-floor clearance decreased   noted jerking mvmts of extrem;knees buckling w/2 step  -DM ranjit decreased;knee buckling;limb motion velocity decreased;narrow base;step length decreased;weight-shifting ability decreased;forward flexed posture  -DM,SG,DM2 ranjit decreased;forward flexed posture;narrow base;step length decreased;decreased heel strike  -DM,SG,DM2    Gait, Safety Issues balance decreased during turns;step length decreased;weight-shifting ability decreased;loses balance backward;supplemental O2  -DM balance decreased during turns;sequencing ability decreased;step length decreased;weight-shifting ability decreased;loses balance backward;supplemental O2   does not step up into walker  -DM,SG,DM2 step length decreased;weight-shifting ability decreased;supplemental O2  -DM,SG,DM2    Gait, Impairments strength decreased;impaired balance;motor control impaired  -DM strength decreased;impaired balance;coordination impaired  -DM,SG,DM2 strength decreased;impaired balance  -DM,SG,DM2    Gait, Comment \"I'm going to fall\" w/ every couple steps  -DM c/o walker not moving as well as tripod; walker was held steady until she stepped up into it   -DM,SG,DM2     Recorded by [DM] Kiera Bennett, PT [DM,SG,DM2] Kiera Bennett, PT (r) Ana Jose PT Student (t) Kiera Bennett, PT (c) " [DM,SG,DM2] Kiera Bennett, PT (r) Ana Jose, PT Student (t) Kiera Bennett, PT (c)    Balance Skills Training    Sitting-Level of Assistance  Close supervision  -DM,SG,DM2 Close supervision  -DM,SG,DM2    Sitting-Balance Support  Right upper extremity supported;Left upper extremity supported  -DM,SG,DM2 Right upper extremity supported;Left upper extremity supported  -DM,SG,DM2    Sitting-Balance Activities  Trunk control activities   changed gown   -DM,SG,DM2 Trunk control activities   LE exercises  -DM,SG,DM2    Sitting # of Minutes  3  -DM,SG,DM2 5  -DM,SG,DM2    Standing-Level of Assistance  Minimum assistance;x2  -DM,SG,DM2 Close supervision  -DM,SG,DM2    Static Standing Balance Support  assistive device  -DM,SG,DM2 assistive device  -DM,SG,DM2    Standing-Balance Activities  Weight Shift A-P;Weight Shift R-L  -DM,SG,DM2 Weight Shift A-P;Weight Shift R-L  -DM,SG,DM2    Standing Balance # of Minutes  1  -DM,SG,DM2 1  -DM,SG,DM2    Gait Balance-Level of Assistance  Minimum assistance;x2   To maintain balance and direct walker  -DM,SG,DM2 Minimum assistance  -DM,SG,DM2    Gait Balance Support  assistive device  -DM,SG,DM2 assistive device  -DM,SG,DM2    Gait Balance Activities  side-stepping;backwards  -DM,SG,DM2 side-stepping;backwards  -DM,SG,DM2    Gait Balance # of Minutes  8  -DM,SG,DM2 8  -DM,SG,DM2    Recorded by  [DM,SG,DM2] Kiera Bennett, PT (r) Ana Jose, PT Student (t) Kiera Bennett, PT (c) [DM,SG,DM2] Kiera Bennett, PT (r) Ana Jose, PT Student (t) Kiera Bennett, PT (c)    Therapy Exercises    Bilateral Lower Extremities AAROM:;AROM:;10 reps;sitting;ankle pumps/circles;heel slides;hip abduction/adduction;hip ER;hip flexion;hip IR;LAQ;SAQ   BKFO  -DM --  -DM AROM:;sitting;15 reps;LAQ;hip flexion;ankle pumps/circles;10 reps;hip abduction/adduction  -DM,SG,DM2    Bilateral Upper Extremity AROM:;10 reps;sitting;elbow flexion/extension;shoulder abduction/adduction;shoulder  extension/flexion;shoulder horizontal abd/add  -DM      Recorded by [DM] Kiera Bennett, PT [DM] Kiera Bennett, PT [DM,SG,DM2] Kiera Bennett, PT (r) Ana Jose, PT Student (t) Kiera Bennett, PT (c)    Positioning and Restraints    Pre-Treatment Position sitting in chair/recliner  -DM sitting in chair/recliner  -DM,SG,DM2 in bed  -DM,SG,DM2    Post Treatment Position chair  -DM bed  -DM,SG,DM2 chair  -DM,SG,DM2    In Bed  fowlers;call light within reach;encouraged to call for assist;exit alarm on  -DM,SG,DM2     In Chair sitting;call light within reach;exit alarm on;with other staff;RUE elevated;LUE elevated;legs elevated  -DM  reclined;call light within reach;encouraged to call for assist;exit alarm on;with family/caregiver  -DM,SG,DM2    Recorded by [DM] Kiera Bennett, PT [DM,SG,DM2] Kiera Bennett, PT (r) Ana Jose, PT Student (t) Kiera Bennett, PT (c) [DM,SG,DM2] Kiera Bennett, PT (r) Ana Jose, PT Student (t) Kiera Bennett, PT (c)      01/11/17 1139 01/10/17 1005       Rehab Assessment/Intervention    Discipline occupational therapist  -SK physical therapist  -SH     Document Type therapy note (daily note)  -SK therapy note (daily note)  -SH     Subjective Information no complaints;agree to therapy  -SK no complaints;agree to therapy  -SH     Patient Effort, Rehab Treatment good  -SK good  -SH     Symptoms Noted During/After Treatment fatigue  -SK fatigue  -SH     Precautions/Limitations fall precautions;oxygen therapy device and L/min   impaired skin integrity  -SK      Equipment Issued to Patient feeding equipment   sippy cup, improved grasp however still weak in BUE's  -SK      Recorded by [SK] Antonina Albarado, OTR [SH] Johnna Esposito, PT     Pain Assessment    Pain Assessment No/denies pain  -SK No/denies pain  -SH     Recorded by [SK] Antonina Albarado, OTR [SH] Johnna Esposito, PT     Cognitive Assessment/Intervention    Current Cognitive/Communication Assessment  functional  -SK impaired  -     Orientation Status oriented to;person;situation  -SK oriented to;person  -     Follows Commands/Answers Questions 100% of the time;able to follow single-step instructions  -SK able to follow single-step instructions;100% of the time;needs cueing  -     Personal Safety mild impairment  -SK mild impairment  -     Personal Safety Interventions fall prevention program maintained  -SK fall prevention program maintained;gait belt;nonskid shoes/slippers when out of bed  -     Recorded by [SK] Antonina Albarado OTR [SH] Johnna Esposito, PT     Bed Mobility, Assessment/Treatment    Bed Mobility, Assistive Device  head of bed elevated;bed rails  -     Bed Mobility, Roll Right, [place]  supervision required  -     Bed Mobility, Scoot/Bridge, [place]  contact guard assist;verbal cues required  -     Bed Mob, Supine to Sit, [place]  supervision required;verbal cues required  -     Bed Mobility, Safety Issues  cognitive deficits limit understanding  -     Bed Mobility, Impairments  strength decreased;impaired balance  -     Bed Mobility, Comment  Pt anxious to get out of bed this am.  -     Recorded by  [SH] Johnna Esposito, PT     Transfer Assessment/Treatment    Transfers, Sit-Stand Comal  minimum assist (75% patient effort);2 person assist required;verbal cues required;nonverbal cues required (demo/gesture)  -     Transfers, Stand-Sit Comal  minimum assist (75% patient effort);2 person assist required;verbal cues required;nonverbal cues required (demo/gesture)  -     Transfers, Sit-Stand-Sit, Assist Device  rolling walker  -     Transfer, Safety Issues  balance decreased during turns;sequencing ability decreased;step length decreased  -     Transfer, Impairments  strength decreased;impaired balance  -     Transfer, Comment pt refused  -SK Pt performed sit to stand at edge of bed to don brief for transfer and OOB. Pt returned to  sitting. Performed sit to stand a second time and transferred to chair with RW. Pt posterior lean, anxious to sit down.  -     Recorded by [SK] Antonina Albarado, OTR [] Johnna Esposito, PT     Gait Assessment/Treatment    Gait, [place] Level  moderate assist (50% patient effort);2 person assist required;verbal cues required;nonverbal cues required (demo/gesture)  -     Gait, Assistive Device  rolling walker  -     Gait, Distance (Feet)  2  -     Gait, Gait Pattern Analysis  swing-to gait  -     Gait, Gait Deviations  ranjit decreased;forward flexed posture  -     Gait, Safety Issues  balance decreased during turns;sequencing ability decreased;step length decreased;loses balance backward;supplemental O2  -     Gait, Comment  Steps to chair only.  -     Recorded by  [] Johnna Esposito, PT     ADL Assessment/Intervention    Additional Documentation Self-Feeding Assessment/Training (Group)  -SK      Recorded by [SK] Antonina Albarado, OTR      Self-Feeding Assessment/Training    Self-Feeding Assess/Train, Assist Device other (see comments)  -SK      Self-Feeding Assess/Train, Position supported sitting  -SK      Self-Feeding Assess/Train, [place] hand over hand  -SK      Self-Feeding Assess/Train, Impairments strength decreased;coordination impaired  -SK      Self-Feeding Assess/Train, Comment issued sippy cup per nsg request-pt with improved grasp however d/t weakness of BUE's, pt still demo's some difficulty. FMC and grasp strength preventing self-feeding with utinsils this date  -SK      Recorded by [SK] Antonina Albarado, OTR      Balance Skills Training    Sitting-Level of Assistance  Contact guard  -     Sitting-Balance Support  Feet supported;Right upper extremity supported;Left upper extremity supported  -     Sitting-Balance Activities  --   Maintain midline  -     Sitting # of Minutes  5  -     Recorded by  [] Johnna Esposito, PT     Therapy Exercises    Bilateral  Lower Extremities  AROM:;10 reps;sitting;ankle pumps/circles;LAQ;hip flexion  -     Bilateral Upper Extremity  AROM:;10 reps;sitting;elbow flexion/extension;shoulder extension/flexion  -     Recorded by  [SH] Johnna Esposito, PT     Positioning and Restraints    Pre-Treatment Position in bed  -SK in bed  -     Post Treatment Position bed  -SK chair  -SH     In Bed notified nsg;supine;call light within reach;encouraged to call for assist;exit alarm on  -SK      In Chair  reclined;notified nsg;call light within reach;encouraged to call for assist;exit alarm on;legs elevated  -     Recorded by [SK] Antonina Albarado, OTR [SH] Johnna Esposito, PT       User Key  (r) = Recorded By, (t) = Taken By, (c) = Cosigned By    Initials Name Effective Dates     Johnna Esposito, PT 06/19/15 -     SK Antonina Albarado, OTR 05/18/15 -     DM Kiera Bennett, PT 06/19/15 -     SG Ana Jose, PT Student 12/19/16 -           PT Recommendation and Plan  Anticipated Discharge Disposition: skilled nursing facility  PT Frequency: daily  Plan of Care Review  Plan Of Care Reviewed With: patient  Progress: progress towards functional goals is fair  Outcome Summary/Follow up Plan: Noted incr. confusion, jerking mvmts, anxiety re:fear of falling, knee instabil, elev BP & fat. this session;unable to meet goals;will benefit from S.T. rehab at the [facility] to achieve mobil. goals          Outcome Measures       01/13/17 0855 01/12/17 1300 01/11/17 1600    How much help from another person do you currently need...    Turning from your back to your side while in flat bed without using bedrails? 3  -DM 3  -DM (r) SG (t) DM (c) 3  -DM (r) SG (t) DM (c)    Moving from lying on back to sitting on the side of a flat bed without bedrails? 2  -DM 2  -DM (r) SG (t) DM (c) 3  -DM (r) SG (t) DM (c)    Moving to and from a bed to a chair (including a wheelchair)? 2  -DM 2  -DM (r) SG (t) DM (c) 3  -DM (r) SG (t) DM (c)    Standing up from a  chair using your arms (e.g., wheelchair, bedside chair)? 2  -DM 2  -DM (r) SG (t) DM (c) 3  -DM (r) SG (t) DM (c)    Climbing 3-5 steps with a railing? 1  -DM 1  -DM (r) SG (t) DM (c) 1  -DM (r) SG (t) DM (c)    To walk in hospital room? 2  -DM 2  -DM (r) SG (t) DM (c) 3  -DM (r) SG (t) DM (c)    AM-PAC 6 Clicks Score 12  -DM 12  -DM (r) SG (t) 16  -DM (r) SG (t)    Functional Assessment    Outcome Measure Options AM-PAC 6 Clicks Basic Mobility (PT)  -DM  AM-PAC 6 Clicks Basic Mobility (PT)  -DM (r) SG (t) DM (c)      01/11/17 1139 01/10/17 1005       How much help from another person do you currently need...    Turning from your back to your side while in flat bed without using bedrails?  3  -SH     Moving from lying on back to sitting on the side of a flat bed without bedrails?  3  -SH     Moving to and from a bed to a chair (including a wheelchair)?  3  -SH     Standing up from a chair using your arms (e.g., wheelchair, bedside chair)?  2  -SH     Climbing 3-5 steps with a railing?  1  -SH     To walk in hospital room?  2  -SH     AM-PAC 6 Clicks Score  14  -SH     How much help from another is currently needed...    Putting on and taking off regular lower body clothing? 2  -SK      Bathing (including washing, rinsing, and drying) 2  -SK      Toileting (which includes using toilet bed pan or urinal) 1  -SK      Putting on and taking off regular upper body clothing 2  -SK      Taking care of personal grooming (such as brushing teeth) 2  -SK      Eating meals 2  -SK      Score 11  -SK      Functional Assessment    Outcome Measure Options  AM-PAC 6 Clicks Basic Mobility (PT)  -SH       User Key  (r) = Recorded By, (t) = Taken By, (c) = Cosigned By    Initials Name Provider Type    AAYUSH Esposito, PT Physical Therapist    QUYEN Albarado, OTR Occupational Therapist    NICHOLAS Bennett, PT Physical Therapist    SG Ana Jose, PT Student PT Student           Time Calculation:         PT Charges        01/13/17 0900          Time Calculation    Start Time 0855  -DM      PT Received On 01/13/17  -DM      PT Goal Re-Cert Due Date 01/13/17  -DM      Time Calculation- PT    Total Timed Code Minutes- PT 37 minute(s)  -DM        User Key  (r) = Recorded By, (t) = Taken By, (c) = Cosigned By    Initials Name Provider Type    DM Kiera Bennett, PT Physical Therapist          Therapy Charges for Today     Code Description Service Date Service Provider Modifiers Qty    03114084179 HC PT THER PROC EA 15 MIN 1/13/2017 Kiera Bennett, PT GP 1    52709867003 HC GAIT TRAINING EA 15 MIN 1/13/2017 Kiera Bennett, PT GP 1          PT G-Codes  Outcome Measure Options: AM-PAC 6 Clicks Basic Mobility (PT)    PT Discharge Summary  Anticipated Discharge Disposition: skilled nursing facility  Reason for Discharge: Discharge from facility  Outcomes Achieved: Patient able to partially acheive established goals  Discharge Destination: SNF    Kiera Bennett, PT  1/13/2017

## 2017-01-13 NOTE — DISCHARGE PLACEMENT REQUEST
"Naomy Allen (89 y.o. Female)   To [facility]  From Haily Shoemaker [phone]    Date of Birth Social Security Number Address Home Phone MRN    09/08/1921  [address] [place], [place] [address] [phone] [id number]    [employer] Marital Status          [employer]        Admission Date Admission Type Admitting Provider Attending Provider Department, Room/Bed    1/2/17 Emergency Fabi Mccoy DO Atkins, Rebecca L, DO [facility] 5G, S554/1    Discharge Date Discharge Disposition Discharge Destination         Skilled Nursing Facility (DC - External)             Attending Provider: Fabi Mccoy DO     Allergies:  No Known Allergies    Isolation:  None   Infection:  None   Code Status:  Conditional    Ht:  62\" (157.5 cm)   Wt:  99 lb 3.2 oz (45 kg)    Admission Cmt:  None   Principal Problem:  Acute diastolic (congestive) heart failure [I50.31]                 Active Insurance as of 1/2/2017     Primary Coverage     Payor Plan Insurance Group Employer/Plan Group    MEDICARE MEDICARE A & B      Payor Plan Address Payor Plan Phone Number Effective From Effective To    [address] [phone] 9/[birth year]/[address]     [place] [address]       Subscriber Name Subscriber Birth Date Member ID       NAOMY ALLEN 9/8/1921 [id number]           Secondary Coverage     Payor Plan Insurance Group Employer/Plan Group    AAR MED SUPP AAR HEALTH CARE OPTIONS      Payor Plan Address Payor Plan Phone Number Effective From Effective To    [facility] [phone] 1/1/2016     [address]       [place] [address]       Subscriber Name Subscriber Birth Date Member ID       NAOMY ALLEN 9/8/1921 [id number]                 Emergency Contacts      (Rel.) Home Phone Work Phone Mobile Phone    Roseline Canas (Daughter) [phone] -- [phone]                 Discharge Summary      SHAGUFTA Boudreaux at 1/13/2017  9:28 AM              [facility] Medicine " Services  DISCHARGE SUMMARY       Date of Admission: 1/2/2017  Date of Discharge:  1/13/2017  Primary Care Physician: Radha Pal MD    Discharge Diagnoses:  Active Hospital Problems (** Indicates Principal Problem)    Diagnosis Date Noted   • **Acute diastolic (congestive) heart failure [I50.31] 01/02/2017   • Hypokalemia [E87.6] 01/07/2017   • Chronic anemia [D64.9] 01/07/2017   • Valvular heart disease [I38] 01/03/2017   • Pulmonary hypertension [I27.2] 01/02/2017   • Acute hypoxic respiratory failure [J96.00] 01/02/2017   • Healthcare Associated Pneumonia [J18.9] 01/02/2017   • Hypertension [I10] 11/23/2016   • Atrial fibrillation, no anticoags due to hx GIB and fall risk [I48.91] 11/23/2016   • Pacemaker at end of battery life [Z45.010] 11/23/2016      Resolved Hospital Problems    Diagnosis Date Noted Date Resolved   No resolved problems to display.       Presenting Problem/History of Present Illness  Acute diastolic (congestive) heart failure [I50.31]     Chief Complaint on Day of Discharge: Dyspnea    Hospital Course  Patient is a 89 y.o. female with history of atrial fibrillation, congestive heart failure, hypertension, remote history of breast and colon cancer that presented on 1/2/2017 with complaints of lower extremity edema and increased dyspnea.  Patient was recently admitted to [facility] in late November and discharged to skilled nursing facility this past December after she was seen per cardiology.  Patient was started on IV antibiotics for possible pneumonia as well as diuresed with IV Lasix.  She was admitted to hospitalist service.  She did complete a seven-day course of IV antibiotics, likely emperic, as findings and physical assessment of patient more consistent with acute congestive heart failure.  She has been restarted at a higher dose of torsemide, 20 mg daily and appears to be back to baseline.  She has no lower extremity edema and respiratory status is at baseline.    Due to  recent GI bleed, patient was taken off all anticoagulation.  H&H have remained stable this admission and no indication of bleeding.  Cardiology was consulted this admission and medications were changed slightly.  Patient has been changed to Diovan twice a day which she will continue at discharge.  She will continue 20 mg torsemide as well as potassium replacement daily.  He shouldn't pacemaker is at end of battery life.  Considering age of patient and goals of care, there are no plans to change battery at this point.    Patient can continue nystatin swish and swallow for thrush over the next 4 days.  If can be discontinued once cleared.  Plans for patient to transfer to the [facility] today for continued care.    Procedures Performed         Consults:   Consults     Date and Time Order Name Status Description    1/4/2017 0402 Inpatient Consult to Cardiology      1/4/2017 0402 Inpatient Consult to Palliative Care MD Completed     1/3/2017 1359 Inpatient Consult to Cardiology            Pertinent Test Results:  Results for NAOMY SHEPPARD (MRN [id number]) as of 1/13/2017 09:30   Ref. Range 1/13/2017 05:49   Glucose Latest Ref Range: 70 - 100 mg/dL 84   Sodium Latest Ref Range: 132 - 146 mmol/L 136   Potassium Latest Ref Range: 3.5 - 5.5 mmol/L 4.1   CO2 Latest Ref Range: 20.0 - 31.0 mmol/L 42.0 (C)   Chloride Latest Ref Range: 99 - 109 mmol/L 91 (L)   Anion Gap Latest Ref Range: 3.0 - 11.0 mmol/L 3.0   Creatinine Latest Ref Range: 0.60 - 1.30 mg/dL 1.10   BUN Latest Ref Range: 9 - 23 mg/dL 26 (H)   BUN/Creatinine Ratio Latest Ref Range: 7.0 - 25.0  23.6   Calcium Latest Ref Range: 8.7 - 10.4 mg/dL 10.0   eGFR Non African Amer Latest Ref Range: >60 mL/min/1.73 46 (L)   Results for NAOMY SHEPPARD (MRN [id number]) as of 1/13/2017 09:30   Ref. Range 1/12/2017 12:07   WBC Latest Ref Range: 3.50 - 10.80 10*3/mm3 9.28   RBC Latest Ref Range: 3.89 - 5.14 10*6/mm3 2.78 (L)   Hemoglobin Latest Ref Range:  "11.5 - 15.5 g/dL 9.4 (L)   Hematocrit Latest Ref Range: 34.5 - 44.0 % 29.4 (L)   RDW Latest Ref Range: 11.3 - 14.5 % 19.0 (H)   MCV Latest Ref Range: 80.0 - 99.0 fL 105.8 (H)   MCH Latest Ref Range: 27.0 - 31.0 pg 33.8 (H)   MCHC Latest Ref Range: 32.0 - 36.0 g/dL 32.0   MPV Latest Ref Range: 6.0 - 12.0 fL 10.8   Platelets Latest Ref Range: 150 - 450 10*3/mm3 233   RDW-SD Latest Ref Range: 37.0 - 54.0 fl 73.8 (H)   EXAMINATION: XR CHEST, SINGLE VIEW - 01/08/2017      INDICATION: I50.31-Acute diastolic (congestive) heart failure;  J98.01-Acute bronchospasm; J96.01-Acute respiratory failure with  hypoxia; J18.9-Pneumonia, unspecified organism; Z74.09-Other reduced  mobility; I48.2-Chronic atrial fibrillation; I10-Essential (primary)  hypertension.       COMPARISON: 01/05/2017      FINDINGS: Portable chest reveals heart to be enlarged. Increased  markings are seen within the upper and mid lung fields. Small bilateral  pleural effusions. Degenerative change is seen within the spine.  Vascular calcifications are present. Pacer lead is unchanged.       IMPRESSION:  Stable chest as above.    Condition on Discharge: stable  Physical Exam on Discharge:  Visit Vitals   • /88 (BP Location: Right arm, Patient Position: Lying)   • Pulse 90   • Temp 98 °F (36.7 °C) (Oral)   • Resp 18   • Ht 62\" (157.5 cm)   • Wt 99 lb 3.2 oz (45 kg)   • SpO2 92%   • BMI 18.14 kg/m2     Physical Exam   Constitutional: She is oriented to person, place, and time. No distress.   frail   HENT:   Head: Normocephalic and atraumatic.   Eyes: Pupils are equal, round, and reactive to light. No scleral icterus.   Neck: Normal range of motion. Neck supple. No JVD present.   Cardiovascular: Normal rate, regular rhythm, normal heart sounds and intact distal pulses.    No murmur heard.  Pulmonary/Chest: Effort normal and breath sounds normal. No respiratory distress. She has no wheezes.   Diminished, but clear   Abdominal: Soft. Bowel sounds are normal. " She exhibits no distension. There is no tenderness.   Musculoskeletal: Normal range of motion. She exhibits no edema.   Neurological: She is alert and oriented to person, place, and time.   Skin: Skin is warm and dry.   Psychiatric: She has a normal mood and affect. Her behavior is normal. Judgment and thought content normal.         Discharge Disposition  Skilled Nursing Facility (DC - External)    Discharge Medications   Avril Allen   Home Medication Instructions JOCELYNN:[id number]    Printed on:01/13/17 8539   Medication Information                      acetaminophen (TYLENOL) 325 MG tablet  Take 2 tablets by mouth Every 4 (Four) Hours As Needed for mild pain (1-3).             aspirin 325 MG tablet  Take 325 mg by mouth Daily.             brimonidine (ALPHAGAN) 0.15 % ophthalmic solution  Administer 1 drop to the right eye 2 (Two) Times a Day.             carvedilol (COREG) 6.25 MG tablet  Take 6.25 mg by mouth 2 (Two) Times a Day With Meals.             docusate sodium (COLACE) 100 MG capsule  Take 200 mg by mouth Daily.             DULoxetine (CYMBALTA) 30 MG capsule  Take 30 mg by mouth Daily.             fexofenadine (ALLEGRA) 180 MG tablet  Take 180 mg by mouth daily.             FLUoxetine (PROzac) 40 MG capsule  Take 40 mg by mouth Daily.             fluticasone (FLONASE) 50 MCG/ACT nasal spray  2 sprays into each nostril Daily.             folic acid (FOLVITE) 400 MCG tablet  Take 400 mcg by mouth daily.             gabapentin (NEURONTIN) 100 MG capsule  Take 100 mg by mouth Every Night.             guaifenesin-dextromethorphan (MUCINEX DM)  MG tablet sustained-release 12 hour tablet  Take 1 tablet by mouth 2 (Two) Times a Day.             ipratropium-albuterol (DUO-NEB) 0.5-2.5 mg/mL nebulizer  Take 3 mL by nebulization Every 4 (Four) Hours As Needed for shortness of air.             lidocaine (LIDODERM) 5 %  Place 1 patch on the skin Daily. Remove & Discard patch within 12 hours or as directed  by MD             meclizine (ANTIVERT) 12.5 MG tablet  Take 12.5 mg by mouth 2 (Two) Times a Day As Needed for dizziness.             nystatin (MYCOSTATIN) 633853 UNIT/GM powder  Apply  topically Every 12 (Twelve) Hours.             omeprazole (priLOSEC) 20 MG capsule  Take 20 mg by mouth Daily.             ondansetron ODT (ZOFRAN-ODT) 4 MG disintegrating tablet  Take 4 mg by mouth 3 (Three) Times a Day As Needed for nausea or vomiting.             polyethylene glycol (MIRALAX) packet  Take 17 g by mouth Daily As Needed.             potassium chloride (K-DUR,KLOR-CON) 10 MEQ CR tablet  Take 10 mEq by mouth Daily.             torsemide (DEMADEX) 10 MG tablet  Take 2 tablets by mouth Daily.             valsartan (DIOVAN) 40 MG tablet  Take 1 tablet by mouth Every 12 (Twelve) Hours.             vitamin B-12 (CYANOCOBALAMIN) 1000 MCG tablet  Take 1,000 mcg by mouth Daily.                 Discharge Diet:   Diet Instructions     Diet: Regular, Cardiac, Soft Texture; Thin Liquids, No Restrictions; Whole       Discharge Diet:   Regular  Cardiac  Soft Texture      Fluid Consistency:  Thin Liquids, No Restrictions   Soft Options:  Whole   Boost with every meal                 Discharge Care Plan / Instructions:    Activity at Discharge:   Activity Instructions     Activity as Tolerated                     Follow-up Appointments  No future appointments.  Referrals and Follow-ups to Schedule     Follow-Up    As directed    pcp   Follow Up Details:  1-2 weeks                 Test Results Pending at Discharge   Order Current Status    Green Top (No Gel) Collected (01/02/17 1658)    [place] Draw In process           SHAGUFTA Rodriguez 01/13/17 9:28 AM    Time: Discharge 33 min    Please note that portions of this note may have been completed with a voice recognition program. Efforts were made to edit the dictations, but occasionally words are mistranscribed.         Electronically signed by SHAGUFTA Boudreaux at 1/13/2017   9:40 AM

## 2017-01-13 NOTE — DISCHARGE SUMMARY
[facility] Medicine Services  DISCHARGE SUMMARY       Date of Admission: 1/2/2017  Date of Discharge:  1/13/2017  Primary Care Physician: Radha Pal MD    Discharge Diagnoses:  Active Hospital Problems (** Indicates Principal Problem)    Diagnosis Date Noted   • **Acute diastolic (congestive) heart failure [I50.31] 01/02/2017   • Hypokalemia [E87.6] 01/07/2017   • Chronic anemia [D64.9] 01/07/2017   • Valvular heart disease [I38] 01/03/2017   • Pulmonary hypertension [I27.2] 01/02/2017   • Acute hypoxic respiratory failure [J96.00] 01/02/2017   • Healthcare Associated Pneumonia [J18.9] 01/02/2017   • Hypertension [I10] 11/23/2016   • Atrial fibrillation, no anticoags due to hx GIB and fall risk [I48.91] 11/23/2016   • Pacemaker at end of battery life [Z45.010] 11/23/2016      Resolved Hospital Problems    Diagnosis Date Noted Date Resolved   No resolved problems to display.       Presenting Problem/History of Present Illness  Acute diastolic (congestive) heart failure [I50.31]     Chief Complaint on Day of Discharge: Dyspnea    Hospital Course  Patient is a 89 y.o. female with history of atrial fibrillation, congestive heart failure, hypertension, remote history of breast and colon cancer that presented on 1/2/2017 with complaints of lower extremity edema and increased dyspnea.  Patient was recently admitted to [facility] in late November and discharged to skilled nursing facility this past December after she was seen per cardiology.  Patient was started on IV antibiotics for possible pneumonia as well as diuresed with IV Lasix.  She was admitted to hospitalist service.  She did complete a seven-day course of IV antibiotics, likely emperic, as findings and physical assessment of patient more consistent with acute congestive heart failure.  She has been restarted at a higher dose of torsemide, 20 mg daily and appears to be back to baseline.  She has no lower extremity edema and  respiratory status is at baseline.    Due to recent GI bleed, patient was taken off all anticoagulation.  H&H have remained stable this admission and no indication of bleeding.  Cardiology was consulted this admission and medications were changed slightly.  Patient has been changed to Diovan twice a day which she will continue at discharge.  She will continue 20 mg torsemide as well as potassium replacement daily.  He shouldn't pacemaker is at end of battery life.  Considering age of patient and goals of care, there are no plans to change battery at this point.    Patient can continue nystatin swish and swallow for thrush over the next 4 days.  If can be discontinued once cleared.  Plans for patient to transfer to the [facility] today for continued care.    Procedures Performed         Consults:   Consults     Date and Time Order Name Status Description    1/4/2017 0402 Inpatient Consult to Cardiology      1/4/2017 0402 Inpatient Consult to Palliative Care MD Completed     1/3/2017 1351 Inpatient Consult to Cardiology            Pertinent Test Results:  Results for NAOMY SHEPPARD (MRN [id number]) as of 1/13/2017 09:30   Ref. Range 1/13/2017 05:49   Glucose Latest Ref Range: 70 - 100 mg/dL 84   Sodium Latest Ref Range: 132 - 146 mmol/L 136   Potassium Latest Ref Range: 3.5 - 5.5 mmol/L 4.1   CO2 Latest Ref Range: 20.0 - 31.0 mmol/L 42.0 (C)   Chloride Latest Ref Range: 99 - 109 mmol/L 91 (L)   Anion Gap Latest Ref Range: 3.0 - 11.0 mmol/L 3.0   Creatinine Latest Ref Range: 0.60 - 1.30 mg/dL 1.10   BUN Latest Ref Range: 9 - 23 mg/dL 26 (H)   BUN/Creatinine Ratio Latest Ref Range: 7.0 - 25.0  23.6   Calcium Latest Ref Range: 8.7 - 10.4 mg/dL 10.0   eGFR Non African Amer Latest Ref Range: >60 mL/min/1.73 46 (L)   Results for NAOMY SHEPPARD (MRN [id number]) as of 1/13/2017 09:30   Ref. Range 1/12/2017 12:07   WBC Latest Ref Range: 3.50 - 10.80 10*3/mm3 9.28   RBC Latest Ref Range: 3.89 - 5.14  "10*6/mm3 2.78 (L)   Hemoglobin Latest Ref Range: 11.5 - 15.5 g/dL 9.4 (L)   Hematocrit Latest Ref Range: 34.5 - 44.0 % 29.4 (L)   RDW Latest Ref Range: 11.3 - 14.5 % 19.0 (H)   MCV Latest Ref Range: 80.0 - 99.0 fL 105.8 (H)   MCH Latest Ref Range: 27.0 - 31.0 pg 33.8 (H)   MCHC Latest Ref Range: 32.0 - 36.0 g/dL 32.0   MPV Latest Ref Range: 6.0 - 12.0 fL 10.8   Platelets Latest Ref Range: 150 - 450 10*3/mm3 233   RDW-SD Latest Ref Range: 37.0 - 54.0 fl 73.8 (H)   EXAMINATION: XR CHEST, SINGLE VIEW - 01/08/2017      INDICATION: I50.31-Acute diastolic (congestive) heart failure;  J98.01-Acute bronchospasm; J96.01-Acute respiratory failure with  hypoxia; J18.9-Pneumonia, unspecified organism; Z74.09-Other reduced  mobility; I48.2-Chronic atrial fibrillation; I10-Essential (primary)  hypertension.       COMPARISON: 01/05/2017      FINDINGS: Portable chest reveals heart to be enlarged. Increased  markings are seen within the upper and mid lung fields. Small bilateral  pleural effusions. Degenerative change is seen within the spine.  Vascular calcifications are present. Pacer lead is unchanged.       IMPRESSION:  Stable chest as above.    Condition on Discharge: stable  Physical Exam on Discharge:  Visit Vitals   • /88 (BP Location: Right arm, Patient Position: Lying)   • Pulse 90   • Temp 98 °F (36.7 °C) (Oral)   • Resp 18   • Ht 62\" (157.5 cm)   • Wt 99 lb 3.2 oz (45 kg)   • SpO2 92%   • BMI 18.14 kg/m2     Physical Exam   Constitutional: She is oriented to person, place, and time. No distress.   frail   HENT:   Head: Normocephalic and atraumatic.   Eyes: Pupils are equal, round, and reactive to light. No scleral icterus.   Neck: Normal range of motion. Neck supple. No JVD present.   Cardiovascular: Normal rate, regular rhythm, normal heart sounds and intact distal pulses.    No murmur heard.  Pulmonary/Chest: Effort normal and breath sounds normal. No respiratory distress. She has no wheezes.   Diminished, but " clear   Abdominal: Soft. Bowel sounds are normal. She exhibits no distension. There is no tenderness.   Musculoskeletal: Normal range of motion. She exhibits no edema.   Neurological: She is alert and oriented to person, place, and time.   Skin: Skin is warm and dry.   Psychiatric: She has a normal mood and affect. Her behavior is normal. Judgment and thought content normal.         Discharge Disposition  Skilled Nursing Facility (DC - External)    Discharge Medications   Avril Allen   Home Medication Instructions JOCELYNN:[id number]    Printed on:01/13/17 8601   Medication Information                      acetaminophen (TYLENOL) 325 MG tablet  Take 2 tablets by mouth Every 4 (Four) Hours As Needed for mild pain (1-3).             aspirin 325 MG tablet  Take 325 mg by mouth Daily.             brimonidine (ALPHAGAN) 0.15 % ophthalmic solution  Administer 1 drop to the right eye 2 (Two) Times a Day.             carvedilol (COREG) 6.25 MG tablet  Take 6.25 mg by mouth 2 (Two) Times a Day With Meals.             docusate sodium (COLACE) 100 MG capsule  Take 200 mg by mouth Daily.             DULoxetine (CYMBALTA) 30 MG capsule  Take 30 mg by mouth Daily.             fexofenadine (ALLEGRA) 180 MG tablet  Take 180 mg by mouth daily.             FLUoxetine (PROzac) 40 MG capsule  Take 40 mg by mouth Daily.             fluticasone (FLONASE) 50 MCG/ACT nasal spray  2 sprays into each nostril Daily.             folic acid (FOLVITE) 400 MCG tablet  Take 400 mcg by mouth daily.             gabapentin (NEURONTIN) 100 MG capsule  Take 100 mg by mouth Every Night.             guaifenesin-dextromethorphan (MUCINEX DM)  MG tablet sustained-release 12 hour tablet  Take 1 tablet by mouth 2 (Two) Times a Day.             ipratropium-albuterol (DUO-NEB) 0.5-2.5 mg/mL nebulizer  Take 3 mL by nebulization Every 4 (Four) Hours As Needed for shortness of air.             lidocaine (LIDODERM) 5 %  Place 1 patch on the skin Daily.  Remove & Discard patch within 12 hours or as directed by MD             meclizine (ANTIVERT) 12.5 MG tablet  Take 12.5 mg by mouth 2 (Two) Times a Day As Needed for dizziness.             nystatin (MYCOSTATIN) 394394 UNIT/GM powder  Apply  topically Every 12 (Twelve) Hours.             omeprazole (priLOSEC) 20 MG capsule  Take 20 mg by mouth Daily.             ondansetron ODT (ZOFRAN-ODT) 4 MG disintegrating tablet  Take 4 mg by mouth 3 (Three) Times a Day As Needed for nausea or vomiting.             polyethylene glycol (MIRALAX) packet  Take 17 g by mouth Daily As Needed.             potassium chloride (K-DUR,KLOR-CON) 10 MEQ CR tablet  Take 10 mEq by mouth Daily.             torsemide (DEMADEX) 10 MG tablet  Take 2 tablets by mouth Daily.             valsartan (DIOVAN) 40 MG tablet  Take 1 tablet by mouth Every 12 (Twelve) Hours.             vitamin B-12 (CYANOCOBALAMIN) 1000 MCG tablet  Take 1,000 mcg by mouth Daily.                 Discharge Diet:   Diet Instructions     Diet: Regular, Cardiac, Soft Texture; Thin Liquids, No Restrictions; Whole       Discharge Diet:   Regular  Cardiac  Soft Texture      Fluid Consistency:  Thin Liquids, No Restrictions   Soft Options:  Whole   Boost with every meal                 Discharge Care Plan / Instructions:    Activity at Discharge:   Activity Instructions     Activity as Tolerated                     Follow-up Appointments  No future appointments.  Referrals and Follow-ups to Schedule     Follow-Up    As directed    pcp   Follow Up Details:  1-2 weeks                 Test Results Pending at Discharge   Order Current Status    Green Top (No Gel) Collected (01/02/17 6520)    [place] Draw In process           Fabi Sandra, SHAGUFTA 01/13/17 9:28 AM    Time: Discharge 33 min    Please note that portions of this note may have been completed with a voice recognition program. Efforts were made to edit the dictations, but occasionally words are mistranscribed.

## 2017-01-13 NOTE — PROGRESS NOTES
Continued Stay Note   [place]     Patient Name: Avril Allen  MRN: [id number]  Today's Date: 1/13/2017    Admit Date: 1/2/2017          Discharge Plan       01/13/17 1014    Case Management/Social Work Plan    Plan SNF    Patient/Family In Agreement With Plan yes    Additional Comments Ok to go to a skilled bed at [facility] today. Please call report to [phone] and send copied chart, summary and any scripts with the pt. Daughter will transport in the car with an 02 tank from [employer]. Pt and family in agreement with the transfer.    Final Note    Final Note To SNF. I faxed the summary              Discharge Codes       01/13/17 1013    Discharge Codes    Discharge Codes 03  Discharged/transferred to skilled nursing facility (SNF) with Medicare certification in anticipation of skilled care        Expected Discharge Date and Time     Expected Discharge Date Expected Discharge Time    Jan 13, 2017             Haily Shoemaker RN

## 2017-01-13 NOTE — PLAN OF CARE
Problem: Patient Care Overview (Adult)  Goal: Plan of Care Review  Outcome: Unable to achieve outcome(s) by discharge Date Met:  01/13/17 01/13/17 0950   Coping/Psychosocial Response Interventions   Plan Of Care Reviewed With patient   Patient Care Overview   Progress progress towards functional goals is fair   Outcome Evaluation   Outcome Summary/Follow up Plan Noted incr. confusion, jerking mvmts, anxiety re:fear of falling, knee instabil, elev BP & fat. this session;unable to meet goals;will benefit from S.T. rehab at the [facility] to achieve mobil. goals         Problem: Inpatient Physical Therapy  Goal: Bed Mobility Goal LTG- PT  Outcome: Unable to achieve outcome(s) by discharge Date Met:  01/13/17 01/13/17 0950   Bed Mobility PT LTG   Bed Mobility PT LTG, Date Established 01/03/17   Bed Mobility PT LTG, Time to Achieve 1 wk   Bed Mobility PT LTG, Activity Type all bed mobility   Bed Mobility PT LTG, [place] Level independent   Bed Mobility PT LTG, Outcome goal not met   Bed Mobility PT LTG, Reason Goal Not Met discharged from facility       Goal: Transfer Training Goal 1 LTG- PT  Outcome: Unable to achieve outcome(s) by discharge Date Met:  01/13/17 01/13/17 0950   Transfer Training PT LTG   Transfer Training PT LTG, Date Established 01/03/17   Transfer Training PT LTG, Time to Achieve 1 wk   Transfer Training PT LTG, Activity Type sit to stand/stand to sit;bed to chair /chair to bed;toilet   Transfer Training PT LTG, [place] Level supervision required   Transfer Training PT LTG, Assist Device walker, rolling   Transfer Training PT LTG, Outcome goal not met   Transfer Training PT LTG, Reason Goal Not Met discharged from facility       Goal: Gait Training Goal LTG- PT  Outcome: Unable to achieve outcome(s) by discharge Date Met:  01/13/17 01/13/17 0950   Gait Training PT LTG   Gait Training Goal PT LTG, Date Established 01/03/17   Gait Training Goal PT LTG, Time to Achieve 1 wk   Gait  Training Goal PT LTG, [place] Level contact guard assist   Gait Training Goal PT LTG, Assist Device walker, rolling   Gait Training Goal PT LTG, Distance to Achieve 30   Gait Training Goal PT LTG, Outcome goal not met   Gait Training Goal PT LTG, Reason Goal Not Met di       01/13/17 0950   Gait Training PT LTG   Gait Training Goal PT LTG, Date Established 01/03/17   Gait Training Goal PT LTG, Time to Achieve 1 wk   Gait Training Goal PT LTG, [place] Level contact guard assist   Gait Training Goal PT LTG, Assist Device walker, rolling   Gait Training Goal PT LTG, Distance to Achieve 30   Gait Training Goal PT LTG, Outcome goal not met   Gait Training Goal PT LTG, Reason Goal Not Met discharged from facility   scharged from facility

## 2017-01-13 NOTE — PLAN OF CARE
Problem: Fall Risk (Adult)  Goal: Absence of Falls  Outcome: Ongoing (interventions implemented as appropriate)    01/13/17 0430   Fall Risk (Adult)   Absence of Falls making progress toward outcome         Problem: Cardiac: Heart Failure (Adult)  Goal: Signs and Symptoms of Listed Potential Problems Will be Absent or Manageable (Cardiac: Heart Failure)  Outcome: Ongoing (interventions implemented as appropriate)    01/13/17 0430   Cardiac: Heart Failure   Problems Assessed (Heart Failure) all   Problems Present (Heart Failure) none         Problem: Patient Care Overview (Adult)  Goal: Plan of Care Review  Outcome: Ongoing (interventions implemented as appropriate)    01/13/17 0430   Coping/Psychosocial Response Interventions   Plan Of Care Reviewed With patient   Patient Care Overview   Progress improving   Outcome Evaluation   Outcome Summary/Follow up Plan Pt slept most of the evening. Only c/o pain once last night and it was a low-rated headache that was helped with tylenol. Pt o2 sat is a lot better tonight on less o2. She is now on 1LNC. No n/v. VSS.          Problem: Skin Integrity Impairment, Risk/Actual (Adult)  Goal: Skin Integrity/Wound Healing  Outcome: Ongoing (interventions implemented as appropriate)    01/13/17 0430   Skin Integrity Impairment, Risk/Actual (Adult)   Skin Integrity/Wound Healing making progress toward outcome

## 2017-01-13 NOTE — PROGRESS NOTES
"Adult Nutrition  Assessment/PES    Patient Name:  Avril Allen  YOB: 1921  MRN: [id number]  Admit Date:  1/2/2017    Assessment Date:  1/13/2017        Reason for Assessment       01/13/17 1241    Reason for Assessment    Reason For Assessment/Visit follow up protocol    Time Spent (min) 20    Diagnosis Diagnosis   Per notes this adm              Nutrition/Diet History       01/13/17 1241    Nutrition/Diet History    Reported/Observed By RN    Other Pt was getting ready to leave for NH; RN said pt ate about 25% of bfast and hasnt been drinking the boost supp            Anthropometrics       01/13/17 1242    Anthropometrics    Height 157.5 cm (62\")    Weight 45 kg (99 lb 3.3 oz)    Ideal Body Weight (IBW)    Ideal Body Weight (IBW), Female 50.83    % Ideal Body Weight 88.71    Body Mass Index (BMI)    BMI (kg/m2) 18.18            Labs/Tests/Procedures/Meds       01/13/17 1242    Labs/Tests/Procedures/Meds    Labs/Tests Review Reviewed                Nutrition Prescription Ordered       01/13/17 1242    Nutrition Prescription PO    Current PO Diet Soft Texture    Texture Whole foods    Supplement Boost Plus    Supplement Frequency 2 times a day    Common Modifiers Cardiac;Low Sodium            Evaluation of Received Nutrient/Fluid Intake       01/13/17 1243    PO Evaluation    Number of Meals 4    % PO Intake 19              Problem/Interventions:        Problem 1       01/13/17 1243    Nutrition Diagnoses Problem 1    Problem 1 Inadequate Intake/Infusion    Inadequate Intake Type Oral    Etiology (related to) Medical Diagnosis   Clinical condition    Signs/Symptoms (evidenced by) PO Intake    Percent (%) intake recorded 19 %    Over number of meals 4                    Intervention Goal       01/13/17 1244    Intervention Goal    General Nutrition support treatment    PO Increase intake            Nutrition Intervention       01/13/17 1244    Nutrition Intervention    RD/Tech Action Encourage " intake;Follow Tx progress   if adm extended.              Education/Evaluation       01/13/17 6704    Monitor/Evaluation    Monitor Per protocol;PO intake;Supplement intake        Comments:      Electronically signed by:  Kayla Miranda  01/13/17 2:26 PM

## 2017-01-28 PROBLEM — I50.32 CHRONIC DIASTOLIC CONGESTIVE HEART FAILURE (HCC): Status: ACTIVE | Noted: 2017-01-01

## 2017-01-28 PROBLEM — R53.81 DEBILITY: Status: ACTIVE | Noted: 2017-01-01

## 2017-01-28 PROBLEM — D64.9 SYMPTOMATIC ANEMIA: Status: ACTIVE | Noted: 2017-01-01

## 2017-02-02 NOTE — THERAPY DISCHARGE NOTE
Acute Care - Physical Therapy Discharge Summary  [facility]       Patient Name: Avril Allen  : 1921  MRN: [id number]    Today's Date: 2017  Onset of Illness/Injury or Date of Surgery Date: 17    Date of Referral to PT: 17  Referring Physician: Dr. Crocker      Admit Date: 2017      PT Recommendation and Plan    Visit Dx:    ICD-10-CM ICD-9-CM   1. Symptomatic anemia D64.9 285.9   2. General weakness R53.1 780.79   3. Impaired functional mobility, balance, gait, and endurance Z74.09 V49.89   4. Impaired mobility and ADLs Z74.09 799.89                       IP PT Goals       17 1330          Bed Mobility PT LTG    Bed Mobility PT LTG, Date Established 17  -RB      Bed Mobility PT LTG, Time to Achieve 1 wk  -RB      Bed Mobility PT LTG, Activity Type supine to sit/sit to supine  -RB      Bed Mobility PT LTG, [place] Level conditional independence  -RB      Bed Mobility PT Goal  LTG, Assist Device bed rails  -RB      Bed Mobility PT LTG, Outcome goal ongoing  -RB      Transfer Training PT LTG    Transfer Training PT LTG, Date Established 17  -RB      Transfer Training PT LTG, Time to Achieve 1 wk  -RB      Transfer Training PT LTG, Activity Type sit to stand/stand to sit  -RB      Transfer Training PT LTG, [place] Level minimum assist (75% patient effort)  -RB      Transfer Training PT LTG, Assist Device walker, rolling  -RB      Transfer Training PT LTG, Outcome goal ongoing  -RB      Gait Training PT LTG    Gait Training Goal PT LTG, Date Established 17  -RB      Gait Training Goal PT LTG, Time to Achieve 1 wk  -RB      Gait Training Goal PT LTG, [place] Level minimum assist (75% patient effort)  -RB      Gait Training Goal PT LTG, Assist Device walker, rolling  -RB      Gait Training Goal PT LTG, Distance to Achieve 25  -RB      Gait Training Goal PT LTG, Outcome goal ongoing  -RB      Dynamic Sitting Balance PT LTG    Dynamic Sitting Balance PT  LTG, Date Established 01/28/17  -RB      Dynamic Sitting Balance PT LTG, Time to Achieve 1 wk  -RB      Dynamic Sitting Balance PT LTG, [place] Level conditional independence  -RB      Dynamic Sitting Balance PT LTG, Assist Device UE Support;bed rails  -RB      Dynamic Sitting Balance PT LTG, Outcome goal ongoing  -RB        User Key  (r) = Recorded By, (t) = Taken By, (c) = Cosigned By    Initials Name Provider Type    ARNOLDO Aquino, PT Physical Therapist           Goals Status: Treatment plan discontinued secondary to discharge from acute facility.      PT Discharge Summary  Anticipated Discharge Disposition: skilled nursing facility  Reason for Discharge: Discharge from facility  Discharge Destination: SNF      Shannan Mercado, PT   2/2/2017

## 2017-03-02 NOTE — ED PROVIDER NOTES
Subjective   HPI Comments: Patient is a very nice 89-year-old female who is brought in for evaluation of chest discomfort that was described to the nursing staff at the rehabilitation facility where she currently resides.  The patient's daughter is here currently, and is a nurse who used to work in our emergency department.  The story is primarily provided by the daughter with additional commentary by the patient as needed.  The patient's daughter indicates that the patient is been doing quite well at the nursing home.  The patient herself states that she has no complaints whatsoever currently.  No shortness of breath, chest pain, abdominal pain, nausea, vomiting, diarrhea, fevers, chills, or other concerns.  At the nursing home, she apparently complained of some vague chest pain and a funny feeling in her left hand.  Both have now resolved.  A review of the past medical record indicates that the patient was recently admitted several times in late 2016 in early 2017.  The patient's daughter indicates that the patient's pacemaker battery is dead and that a decision has been made not to replace it due to the patient's high risk status.  The patient is currently not short of breath and has no other complaints as previously mentioned.  Medications have been reviewed in detail and confirmed.  In summary, this is a 89-year-old female brought in for evaluation after complaining of chest pain that is now resolved that occurred earlier today.  She has a history of chronic atrial fibrillation but anticoagulation was discontinued by her cardiology team.  Her primary cardiologist is Dr. Lucas Holley M.D.    Past medical history  Colon CA, Afib, CHF, HTN,     Family history  Heart Disease    Patient is a 89 y.o. female presenting with general illness.   History provided by:  Patient  Illness   Location:  Chest  Quality:  Discomfort  Severity:  Mild  Onset quality:  Sudden  Duration:  1 day  Timing:  Constant  Progression:   Resolved  Chronicity:  New  Context:  Chest discomfort and weird sensation in hand  Associated symptoms: no chest pain, no diarrhea, no fatigue, no fever, no headaches, no myalgias, no nausea, no shortness of breath and no vomiting    Risk factors:  Afib       Review of Systems   Constitutional: Negative for fatigue, fever and unexpected weight change.   HENT: Negative for dental problem, hearing loss and sinus pressure.    Eyes: Negative for visual disturbance.   Respiratory: Negative for chest tightness and shortness of breath.    Cardiovascular: Negative for chest pain, palpitations and leg swelling.   Gastrointestinal: Negative for blood in stool, diarrhea, nausea and vomiting.   Genitourinary: Negative for difficulty urinating, dysuria, frequency, hematuria and urgency.   Musculoskeletal: Negative for myalgias, neck pain and neck stiffness.   Neurological: Negative for seizures, syncope, speech difficulty, light-headedness and headaches.   Psychiatric/Behavioral: Negative for confusion.   All other systems reviewed and are negative.      Past Medical History   Diagnosis Date   • Acute and chronic respiratory failure, unspecified whether with hypoxia or hypercapnia    • Anemia    • Arthritis    • Atrial fibrillation    • Breast cancer    • Cardiac pacemaker    • CHF (congestive heart failure)    • Cognitive communication deficit    • Colon cancer    • Depression    • Diverticulosis    • Endocarditis, valve unspecified    • Environmental allergies    • GERD (gastroesophageal reflux disease)    • Glaucoma    • Hypertension    • Hypokalemia    • Irritable bowel    • Muscle weakness    • Other secondary pulmonary hypertension    • Pneumonia, unspecified organism    • Skin cancer        No Known Allergies    Past Surgical History   Procedure Laterality Date   • Colectomy partial / total     • Mastectomy Right    • Multiple tooth extractions       dentures   • Pacemaker implantation         History reviewed. No  pertinent family history.    Social History     Social History   • Marital status:      Spouse name: N/A   • Number of children: N/A   • Years of education: N/A     Social History Main Topics   • Smoking status: Never Smoker   • Smokeless tobacco: None   • Alcohol use No   • Drug use: No   • Sexual activity: Defer     Other Topics Concern   • None     Social History Narrative    Lives at [facility]         Objective   Physical Exam   Constitutional: She is oriented to person, place, and time. She appears well-developed.  Non-toxic appearance. No distress.   Hard of hearing.    HENT:   Head: Normocephalic and atraumatic.   Right Ear: Tympanic membrane, external ear and ear canal normal.   Left Ear: Tympanic membrane, external ear and ear canal normal.   Nose: Nose normal. No nasal septal hematoma.   Mouth/Throat: Oropharynx is clear and moist. Mucous membranes are not dry. No oral lesions. No trismus in the jaw. No dental abscesses or uvula swelling. No posterior oropharyngeal erythema or tonsillar abscesses. No tonsillar exudate.   Eyes: EOM are normal. Pupils are equal, round, and reactive to light. Right conjunctiva is not injected. Left conjunctiva is not injected.   Neck: Normal range of motion. Neck supple. No JVD present. No tracheal tenderness present. No rigidity. Normal range of motion present.   Cardiovascular: Normal heart sounds.  An irregular rhythm present. Tachycardia present.  Exam reveals no gallop and no friction rub.    Pulmonary/Chest: Effort normal. She has wheezes. She has no rales. She exhibits no tenderness.   Abdominal: Soft. Bowel sounds are normal. She exhibits no distension, no pulsatile midline mass and no mass. There is no tenderness. There is no rigidity, no rebound, no guarding and no tenderness at McBurney's point.   No signs of acute abdomen.  No pain at McBurney's point.  No pulsatile abdominal mass.   Musculoskeletal: Normal range of motion. She exhibits  no edema, tenderness or deformity.   Legs symmetrical in size.    12x8 cm excoriation of the left calf. Bandage removed with no cellulitis, positive granulations, no purulent drainage.    Lymphadenopathy:     She has no cervical adenopathy.   Neurological: She is alert and oriented to person, place, and time. She has normal strength. She displays no tremor. No cranial nerve deficit. She exhibits normal muscle tone.   4/5 strength bilaterally with flexion and extension of fingers, wrist, elbows, knees and hips as well as plantar and dorsiflexion of the foot.   Skin: Skin is warm and dry. No rash noted. No erythema.   No diaphoresis, lesions, nevi, petechia, purpura   Psychiatric: She has a normal mood and affect. Her speech is normal and behavior is normal. Judgment and thought content normal. She is attentive.   Nursing note and vitals reviewed.      Procedures         ED Course  ED Course   Comment By Time   Dr. Isbell paged.  Talha Le 03/02 5657   I talked to the patient and her daughter at great length about the expected course of care and my medical decision-making process.  At this point, the patient has absolutely no symptoms.  My plan is to discuss the case with the cardiologist, Dr. Lucas Isbell and discuss medication management for this patient.  The patient's pacemaker has essentially been dead for approximately 8 months and due to the fact that she was doing well, a decision to not replace the battery was made late last year.  This story was confirmed by Dr. Lucas Isbell, who called back at approximately 1310, during this dictation.  I plan to obtain some laboratory studies on this patient and potentially increase her Coreg from 6.25 mg twice a day to 12.5 mg twice a day.  Dr. Lucas Isbell agrees with this plan and does not believe any further intervention is necessary at this time.  Ultimate disposition following results of studies but I do anticipate discharge home. Andrey Salgado MD 03/02 0438    Pt re-examined by Dr. Salgado and all labs and imaging discussed with the pt and family as well as medication plan.  Talha Le 03/02 1428   On re-examination pt's BP is much improved. Dr. Salgado discussed plan for discharge.  Talha Le 03/02 1603   I reexamined the patient 3 times for a total of 4 evaluations.  I discussed the laboratory studies with the patient as well.  Her hemoglobin and hematocrit are somewhat low at 8.5 and 26.4. Andrey Salgado MD 03/02 1611   The patient's blood counts are consistent with laboratory studies over the last 1 month.  She does have evidence of mild congestive heart failure with a BNP of 1000. Andrey Salgado MD 03/02 1611   Over the last 1-2 years, the patient's BNP has shifted between 5-600 and upwards of 1000.  The patient is currently asymptomatic and has oxygen saturations of 94% with a heart rate of 104.  Blood pressure came down nicely to 140/100.  The patient's chest x-ray did show evidence of congestive heart failure with pleural effusions, small as well as pulmonary edema.  Dr. Isbell has been consult telephonically and is comfortable with the course of care and the plan to increase the patient's Coreg to 12.5 mg twice a day from 6.25 mg twice a day.  The patient's daughter, who is a nurse, is also very comfortable with this plan.  We went ahead and gave the patient her first dose of Coreg 12.5 mg here and she has responded quite nicely with a heart rate that has nicely decreased as well as a blood pressure that is lower.  The patient will follow-up with her PCP Dr. Pal in one week and also Dr. Isbell in one week.  Continue medications as prescribed.  She is to return here for new or changing concerns. Andrey Salgado MD 03/02 1613   Patient's Coreg is to be increased to 12.5 mg twice a day from 6.25 mg twice a day.  Daughter aware of this plan.  I also told them that they could increase to 12.5 mg every morning and continue 6.25 g daily at bedtime if he  desired Andrey Salgado MD 03/02 1599   Please note that the patient's heart rate came down into the 100-110 range very nicely for approximately 30-45 minutes prior to discharge.  The patient's daughter was quite adamant that she would like to be discharged home.  The patient's daughter stated to our nurse that she had a rough day.  A full understanding of the workup, the diagnosis, the findings and the follow-up was outlined and all questions answered. Andrey Salgado MD 03/02 3251     Recent Results (from the past 24 hour(s))   Comprehensive Metabolic Panel    Collection Time: 03/02/17 12:44 PM   Result Value Ref Range    Glucose 94 70 - 100 mg/dL    BUN 14 9 - 23 mg/dL    Creatinine 0.80 0.60 - 1.30 mg/dL    Sodium 135 132 - 146 mmol/L    Potassium 4.1 3.5 - 5.5 mmol/L    Chloride 97 (L) 99 - 109 mmol/L    CO2 30.0 20.0 - 31.0 mmol/L    Calcium 9.8 8.7 - 10.4 mg/dL    Total Protein 7.2 5.7 - 8.2 g/dL    Albumin 3.10 (L) 3.20 - 4.80 g/dL    ALT (SGPT) 9 7 - 40 U/L    AST (SGOT) 24 0 - 33 U/L    Alkaline Phosphatase 127 (H) 25 - 100 U/L    Total Bilirubin 0.9 0.3 - 1.2 mg/dL    eGFR Non African Amer 67 >60 mL/min/1.73    Globulin 4.1 gm/dL    A/G Ratio 0.8 (L) 1.5 - 2.5 g/dL    BUN/Creatinine Ratio 17.5 7.0 - 25.0    Anion Gap 8.0 3.0 - 11.0 mmol/L   Lipase    Collection Time: 03/02/17 12:44 PM   Result Value Ref Range    Lipase 18 6 - 51 U/L   BNP    Collection Time: 03/02/17 12:44 PM   Result Value Ref Range    BNP 1060.0 (H) 0.0 - 100.0 pg/mL   Light Blue Top    Collection Time: 03/02/17 12:44 PM   Result Value Ref Range    Extra Tube hold for add-on    Green Top (Gel)    Collection Time: 03/02/17 12:44 PM   Result Value Ref Range    Extra Tube Hold for add-ons.    Lavender Top    Collection Time: 03/02/17 12:44 PM   Result Value Ref Range    Extra Tube hold for add-on    Gold Top - SST    Collection Time: 03/02/17 12:44 PM   Result Value Ref Range    Extra Tube Hold for add-ons.    CBC Auto Differential     "Collection Time: 03/02/17 12:44 PM   Result Value Ref Range    WBC 7.23 3.50 - 10.80 10*3/mm3    RBC 2.56 (L) 3.89 - 5.14 10*6/mm3    Hemoglobin 8.5 (L) 11.5 - 15.5 g/dL    Hematocrit 26.4 (L) 34.5 - 44.0 %    .1 (H) 80.0 - 99.0 fL    MCH 33.2 (H) 27.0 - 31.0 pg    MCHC 32.2 32.0 - 36.0 g/dL    RDW 20.2 (H) 11.3 - 14.5 %    RDW-SD 76.1 (H) 37.0 - 54.0 fl    MPV 10.6 6.0 - 12.0 fL    Platelets 175 150 - 450 10*3/mm3    Neutrophil % 65.7 41.0 - 71.0 %    Lymphocyte % 13.7 (L) 24.0 - 44.0 %    Monocyte % 11.9 0.0 - 12.0 %    Eosinophil % 6.9 (H) 0.0 - 3.0 %    Basophil % 1.2 (H) 0.0 - 1.0 %    Immature Grans % 0.6 0.0 - 0.6 %    Neutrophils, Absolute 4.75 1.50 - 8.30 10*3/mm3    Lymphocytes, Absolute 0.99 0.60 - 4.80 10*3/mm3    Monocytes, Absolute 0.86 0.00 - 1.00 10*3/mm3    Eosinophils, Absolute 0.50 (H) 0.10 - 0.30 10*3/mm3    Basophils, Absolute 0.09 0.00 - 0.20 10*3/mm3    Immature Grans, Absolute 0.04 (H) 0.00 - 0.03 10*3/mm3   POC Troponin, Rapid    Collection Time: 03/02/17 12:50 PM   Result Value Ref Range    Troponin I 0.03 0.00 - 0.07 ng/mL     Note: In addition to lab results from this visit, the labs listed above may include labs taken at another facility or during a different encounter within the last 24 hours. Please correlate lab times with ED admission and discharge times for further clarification of the services performed during this visit.    XR Chest 1 View   Preliminary Result   Congestive heart failure with extensive pulmonary edema and   small-to-moderate pleural effusions.       D:  03/02/2017   E:  03/02/2017            Vitals:    03/02/17 1232 03/02/17 1236 03/02/17 1532 03/02/17 1627   BP:  (!) 159/113 141/98    BP Location: Right arm Right arm     Patient Position: Sitting      Pulse: 120  107    Resp: 18   20   Temp: 98.3 °F (36.8 °C)   98.2 °F (36.8 °C)   TempSrc: Oral      SpO2: 100%  90%    Weight: 110 lb (49.9 kg)      Height: 65\" (165.1 cm)        Medications - No data to " display  ECG/EMG Results (last 24 hours)     Procedure Component Value Units Date/Time    ECG 12 Lead [[id number]] Collected:  03/02/17 1231     Updated:  03/02/17 1248                      [facility]  EMR Dragon/Transcription disclaimer:   Much of this encounter note is an electronic transcription/translation of spoken language to printed text. The electronic translation of spoken language may permit erroneous, or at times, nonsensical words or phrases to be inadvertently transcribed; Although I have reviewed the note for such errors, some may still exist.     Final diagnoses:   Chronic anemia   Acute congestive heart failure, unspecified congestive heart failure type   Atrial fibrillation, unspecified type       Documentation assistance provided by caroline Le.  Information recorded by the caroline was done at my direction and has been verified and validated by me.     Talha COOPER Mimi  03/02/17 1305       Talha COOPER Mimi  03/02/17 1348       Talha COOPER Mimi  03/02/17 1408       Talha COOPER Mimi  03/02/17 1616       Talha COOPER Mimi  03/02/17 1621       Andrey Salgado MD  03/02/17 2948

## 2017-03-02 NOTE — DISCHARGE INSTRUCTIONS
Follow up with Dr. Pal and Dr. Isbell in 1 week.     Take all medications as prescribed.     Return to the ED if you have new or worsening concerns.

## 2017-03-10 NOTE — ED NOTES
ATTEMPT TO CALL [facility] FOR REPORT. ON HOLD FOR 8 MIN. MULTIPLE TRANSFERS TO INCORRECT NUMBER. UNABLE TO REACH APPROPRIATE RN      Kayla Amador RN  03/10/17 9474

## 2017-03-10 NOTE — PROGRESS NOTES
Discharge Planning Assessment  [facility]     Patient Name: Avril Allen  MRN: [id number]  Today's Date: 3/10/2017    Admit Date: 3/10/2017          Discharge Needs Assessment       03/10/17 0833    Living Environment    Lives With other (see comments)   Assisted living    Living Arrangements residential facility    Provides Primary Care For no one, unable/limited ability to care for self    Primary Care Provided By other (see comments)   Assisted living    Quality Of Family Relationships supportive    Able to Return to Prior Living Arrangements yes    Discharge Needs Assessment    Concerns To Be Addressed discharge planning concerns    Outpatient/Agency/Support Group Needs assisted living facility (specify)    Anticipated Changes Related to Illness none    Equipment Currently Used at Home walker, rolling;walker, standard;wheelchair    Equipment Needed After Discharge none    Discharge Facility/Level Of Care Needs assisted living facility    Transportation Available car;family or friend will provide            Discharge Plan       03/10/17 0835    Case Management/Social Work Plan    Plan Discharge Planning    Patient/Family In Agreement With Plan yes    Additional Comments Pt is a resident of [facility]. D/C plan is to return there. She requires a lot of assistance getting to and from dining area and getting around in general.CM to follow.        Discharge Placement     No information found                Demographic Summary       03/10/17 0831    Referral Information    Arrived From long-term care    Referral Source emergency department    Reason For Consult discharge planning    Record Reviewed medical record    Primary Care Physician Information    Name Radha Pal M.D.            Functional Status       03/10/17 0831    Functional Status Current    Current Functional Level Comment See Nursing and PT assessment    Change in Functional Status Since Onset of Current Illness/Injury no    Functional Status  Prior    Ambulation 1-->assistive equipment    Transferring 1-->assistive equipment    Toileting 1-->assistive equipment    Bathing 1-->assistive equipment    Dressing 1-->assistive equipment    Eating 0-->independent    Communication 0-->understands/communicates without difficulty    Swallowing 0-->swallows foods/liquids without difficulty    IADL    Medications assistive person    Meal Preparation assistive person    Housekeeping assistive person    Laundry assistive person    Shopping assistive person    Oral Care assistive person    Activity Tolerance    Current Activity Limitations none    Usual Activity Tolerance fair    Current Activity Tolerance poor    Employment/Financial    Employment/Finance Comments Verified insurance information            Psychosocial     None            Abuse/Neglect     None            Legal     None            Substance Abuse     None            Patient Forms     None          Mimi Garcia, APRN

## 2017-03-10 NOTE — ED NOTES
SPOKE WITH SAYRA FROM THE [place]. STATES THAT SHE WAS GIVEN REPORT BY NIGHT RN THAT PT XANAX WAS GIVEN AT 5 AM TODAY BUT THAT IT WAS NOT CHARTED AND THAT LAST CHARTING FOR XANAX WAS 12 AM. STATES THAT LAST NORCO ADMIN WAS 7 PM AND LAST TYLENOL WAS AROUND 1130 PM ON 03/09/17 ALTHOUGH THERE WAS NO RECORD OF THIS. ASKED IF RN FROM NIGHT SHIFT GAVE AM MEDS AFTER FINDING PT ON FLOOR TO ESTABLISH FALL TIMELINE. STATES PT WAS GIVEN MEDS AT 5 AM AND PT WAS FOUND ON FLOOR APPROX 6 AM. DR HIRSCH NOTIFIED AND PT DAUGHTER     Kayla Amador RN  03/10/17 5005

## 2017-03-10 NOTE — ED NOTES
"PT CALLING OUT REPEATEDLY \"I FALL\" DISCUSSED WITH JERRY (DAUGHTER),. STATES HER MOTHER HAS BEEN \"DOING THIS LATELY\" HAS BEEN TRX WITH XANAX FOR ANXIETY. DISCUSSED WITH DR HIRSCH. CALL PLACED TO [facility] Amador RN  03/10/17 0906    "

## 2017-03-10 NOTE — ED PROVIDER NOTES
Subjective   HPI Comments: Avril Allen is a 89 y[place]o.[place] [facility] [place] after falling face first out of bed around 0530. Pt was found on the floor by nursing home staff and now pt reports improving pain in her face, back and legs. Per EMS, pt wears 3L of O2 at the nursing facility where she resides. Upon examination in the ED, pt is listless and isn't able to give a full history. Pt's daughter reports the pt has been having anxiety attacks and hallucinations the past week. She takes xanax 3 times daily and does not take any blood thinners.     Hx of A-fib and CHF.      Patient is a 89 y.o. female presenting with fall.   History provided by:  Patient and EMS personnel  History limited by:  Mental status change  Fall   Mechanism of injury: fall    Injury location:  Face  Facial injury location:  Face  Incident location:  [facility]  Time since incident:  2 hours  Arrived directly from scene: yes    Fall:     Fall occurred:  Tripped    Point of impact:  Face  Protective equipment: none    Prior to arrival data:     Patient ambulatory at scene: no      Responsiveness at scene:  Alert  Current pain details:     Pain Severity:  Mild    Pain timing:  Constant  Risk factors: CHF and pacemaker    Risk factors: no anticoagulation therapy        Review of Systems   Unable to perform ROS: Mental status change       Past Medical History   Diagnosis Date   • Acute and chronic respiratory failure, unspecified whether with hypoxia or hypercapnia    • Anemia    • Arthritis    • Atrial fibrillation    • Breast cancer    • Cardiac pacemaker    • CHF (congestive heart failure)    • Cognitive communication deficit    • Colon cancer    • Depression    • Diverticulosis    • Endocarditis, valve unspecified    • Environmental allergies    • GERD (gastroesophageal reflux disease)    • Glaucoma    • Hypertension    • Hypokalemia    • Irritable bowel    • Muscle weakness    • Other secondary  pulmonary hypertension    • Pneumonia, unspecified organism    • Skin cancer        No Known Allergies    Past Surgical History   Procedure Laterality Date   • Colectomy partial / total     • Mastectomy Right    • Multiple tooth extractions       dentures   • Pacemaker implantation         History reviewed. No pertinent family history.    Social History     Social History   • Marital status:      Spouse name: N/A   • Number of children: N/A   • Years of education: N/A     Social History Main Topics   • Smoking status: Never Smoker   • Smokeless tobacco: None   • Alcohol use No   • Drug use: No   • Sexual activity: Defer     Other Topics Concern   • None     Social History Narrative    Lives at [facility]         Objective   Physical Exam   Constitutional: She is oriented to person, place, and time. She appears well-developed and well-nourished.   Pt is listless and answers questions minimally in a dysarthric voice.   HENT:   Head: Normocephalic.   Right Ear: External ear normal.   Left Ear: External ear normal.   Nose: Nose normal.   Mouth/Throat: Mucous membranes are dry.   Skin scrape of bridge of nose. Airway is patent. Bruising of left forehead.   Eyes: Conjunctivae are normal. Pupils are equal, round, and reactive to light.   Neck: Normal range of motion. Neck supple.   Cardiovascular: Normal rate and regular rhythm.  Exam reveals no gallop and no friction rub.    Murmur heard.   Systolic murmur is present with a grade of 2/6   Pulmonary/Chest: Effort normal and breath sounds normal. No respiratory distress. She has no wheezes. She has no rales.   Abdominal: Soft. There is tenderness (She reports abd pain, but is unable to tell where.). There is no guarding.   Musculoskeletal: Normal range of motion.   No obvious back trauma. Evidence of scoliosis and kyphosis. Pelvis is stable. No obvious pain in upper limbs.   Neurological: She is alert and oriented to person, place, and time.    Moves all toes and squeezes right hand.   Skin: Skin is warm and dry.   Bruise upper left chest. Bandaged skin scrape on left leg. Warm feet.   Nursing note and vitals reviewed.      Procedures         ED Course  ED Course   Comment By Time   She will need ambulance transportation back to the [facility] due to oxygen need, weakness, and altered mental status, not safe for personal transport. Keenan Hernandez MD 03/10 0934   Pt's daughter states she is satisfied with our care and is comfortable with discharge. Her and the pt will follow up with the pt's nurse practitioner later today.  RISA Aden Chris 03/10 0935   Pt's daughter states the pt is now complaining of CP. She has a hx of CHF and A-fib, but has never had a myocardial infarction. Dr. Serrano is her cardiologist.  RISA Aden Chris 03/10 1024   I have had several talks with her daughter, Roseline Canas, regarding goals of care and workup.  She is looking at more palliative care than diagnostic and therapeutic.  The decision to pursue and not pursue workup here in the ER made in conjunction with Ms. Canas. Keenan Hernandez MD 03/10 1106     No results found for this or any previous visit (from the past 24 hour(s)).  Note: In addition to lab results from this visit, the labs listed above may include labs taken at another facility or during a different encounter within the last 24 hours. Please correlate lab times with ED admission and discharge times for further clarification of the services performed during this visit.    CT Head Without Contrast   Final Result   1. No acute intracranial process.   2. Small vessel white matter disease.   3. Small soft tissue swelling along the left frontal scalp.       D:  03/10/2017   E:  03/10/2017           This report was finalized on 3/10/2017 3:35 PM by Dr. Remington Segundo MD.            Vitals:    03/10/17 1000 03/10/17 1031 03/10/17 1102 03/10/17 1113   BP: (!) 160/130 (!) 159/115 (!) 134/114     BP Location:       Patient Position:       Pulse: 95 78     Resp:       Temp:    97.6 °F (36.4 °C)   TempSrc:       SpO2: 94%      Weight:       Height:         Medications   neomycin-bacitracin-polymyxin (NEOSPORIN) ointment 1 application (1 application Topical Given 3/10/17 0823)   labetalol (NORMODYNE,TRANDATE) injection 20 mg (20 mg Intravenous Given 3/10/17 1028)     ECG/EMG Results (last 24 hours)     ** No results found for the last 24 hours. **                        [facility]    Final diagnoses:   Traumatic hematoma of forehead, initial encounter   Multiple abrasions   Fall, initial encounter       Documentation assistance provided by caroline Perez.  Information recorded by the scribe was done at my direction and has been verified and validated by me.     Saurav Perez  03/10/17 0734       Saurav Perez  03/10/17 0758       Saurav Perez  03/10/17 0811       Saurav Perez  03/10/17 0934       Keenan Hernandez MD  03/10/17 [id number]

## 2017-03-10 NOTE — ED NOTES
MAURO FROM [facility] CALLED AND STATES PT WAS FOUND ON FLOOR THIS AM BY STAFF. THEY NOTED A HEMATOMA ON LEFT FOREHEAD AND LACERATION ON NOSE. REPORTS DAUGHTER NOTIFIED.      Kayla Amador RN  03/10/17 9716

## 2017-03-10 NOTE — ED NOTES
PT YELLING OUT. DAUGHTER CONTINUES AT BEDSIDE. DR HIRSCH AWARE OF PT COGNITIVE STATE     Kayla Amador RN  03/10/17 0973

## 2017-03-10 NOTE — ED NOTES
REPORT GIVEN TO MARIA ELENA STONE FROM [facility].      Kayla Amador RN  03/10/17 1043

## 2017-03-10 NOTE — ED NOTES
Pt daughter states that she has recently been having issues with hallucinations and anxiety. Has been trx by palliative care with xanax for anxiety. States pt has been using call bell at [facility] and that call bell is frequently not answered and is concerned pt fell as a result.        Kayla Amador RN  03/10/17 1962

## 2017-03-10 NOTE — DISCHARGE INSTRUCTIONS
Follow up with your nurse practitioner as scheduled today.    Return to the Emergency Department for new or worsening concerns.
